# Patient Record
Sex: FEMALE | Race: WHITE | NOT HISPANIC OR LATINO | Employment: FULL TIME | URBAN - METROPOLITAN AREA
[De-identification: names, ages, dates, MRNs, and addresses within clinical notes are randomized per-mention and may not be internally consistent; named-entity substitution may affect disease eponyms.]

---

## 2017-06-22 ENCOUNTER — TELEPHONE (OUTPATIENT)
Dept: FAMILY MEDICINE | Facility: CLINIC | Age: 52
End: 2017-06-22

## 2017-06-22 NOTE — TELEPHONE ENCOUNTER
6/22/2017    Call Regarding Preventive Health Screening Colonoscopy    Attempt 1    Message on voicemail     Comments:       Outreach   KV

## 2017-07-31 NOTE — TELEPHONE ENCOUNTER
7/31/2017    Call Regarding Preventive Health Screening Colonoscopy    Attempt 2    Message on voicemail     Comments:           Outreach   Julia Del Valle

## 2017-08-04 NOTE — TELEPHONE ENCOUNTER
8/4/2017    Call Regarding Preventive Health Screening Colonoscopy, LDL and Physical    Attempt 3    Message on voicemail     Comments:       Outreach   KV

## 2017-10-11 ENCOUNTER — HOSPITAL ENCOUNTER (OUTPATIENT)
Dept: MRI IMAGING | Facility: CLINIC | Age: 52
Discharge: HOME | End: 2017-10-11
Attending: ORTHOPAEDIC SURGERY | Admitting: ORTHOPAEDIC SURGERY
Payer: COMMERCIAL

## 2017-10-11 DIAGNOSIS — S49.90XA SHOULDER INJURY: ICD-10-CM

## 2017-10-11 PROCEDURE — 73221 MRI JOINT UPR EXTREM W/O DYE: CPT | Mod: RT

## 2017-10-30 ENCOUNTER — TRANSFERRED RECORDS (OUTPATIENT)
Dept: HEALTH INFORMATION MANAGEMENT | Facility: CLINIC | Age: 52
End: 2017-10-30

## 2017-11-30 ENCOUNTER — OFFICE VISIT (OUTPATIENT)
Dept: DERMATOLOGY | Facility: CLINIC | Age: 52
End: 2017-11-30

## 2017-11-30 DIAGNOSIS — D18.01 CHERRY ANGIOMA: ICD-10-CM

## 2017-11-30 DIAGNOSIS — D48.5 NEOPLASM OF UNCERTAIN BEHAVIOR OF SKIN: Primary | ICD-10-CM

## 2017-11-30 DIAGNOSIS — L82.1 SEBORRHEIC KERATOSIS: ICD-10-CM

## 2017-11-30 DIAGNOSIS — Z12.83 SKIN CANCER SCREENING: ICD-10-CM

## 2017-11-30 DIAGNOSIS — L81.4 SOLAR LENTIGINOSIS: ICD-10-CM

## 2017-11-30 ASSESSMENT — PAIN SCALES - GENERAL: PAINLEVEL: NO PAIN (0)

## 2017-11-30 NOTE — PATIENT INSTRUCTIONS

## 2017-11-30 NOTE — MR AVS SNAPSHOT
After Visit Summary   11/30/2017    Josette Gregg    MRN: [id number]           Patient Information     Date Of Birth          1965        Visit Information        Provider Department      11/30/2017 2:30 PM Mery Frank PA-C [facility] Dermatology        Today's Diagnoses     Neoplasm of uncertain behavior of skin    -  1    Skin cancer screening        Solar lentiginosis        Seborrheic keratosis        Cherry angioma          Care Instructions    Wound Care After a Biopsy    What is a skin biopsy?  A skin biopsy allows the doctor to examine a very small piece of tissue under the microscope to determine the diagnosis and the best treatment for the skin condition. A local anesthetic (numbing medicine)  is injected with a very small needle into the skin area to be tested. A small piece of skin is taken from the area. Sometimes a suture (stitch) is used.     What are the risks of a skin biopsy?  I will experience scar, bleeding, swelling, pain, crusting and redness. I may experience incomplete removal or recurrence. Risks of this procedure are excessive bleeding, bruising, infection, nerve damage, numbness, thick (hypertrophic or keloidal) scar and non-diagnostic biopsy.    How should I care for my wound for the first 24 hours?    Keep the wound dry and covered for 24 hours    If it bleeds, hold direct pressure on the area for 15 minutes. If bleeding does not stop then go to the emergency room    Avoid strenuous exercise the first 1-2 days or as your doctor instructs you    How should I care for the wound after 24 hours?    After 24 hours, remove the bandage    You may bathe or shower as normal    If you had a scalp biopsy, you can shampoo as usual and can use shower water to clean the biopsy site daily    Clean the wound twice a day with gentle soap and water    Do not scrub, be gentle    Apply white petroleum/Vaseline after cleaning the wound with a cotton swab or a clean finger, and  keep the site covered with a Bandaid /bandage. Bandages are not necessary with a scalp biopsy    If you are unable to cover the site with a Bandaid /bandage, re-apply ointment 2-3 times a day to keep the site moist. Moisture will help with healing    Avoid strenuous activity for first 1-2 days    Avoid lakes, rivers, pools, and oceans until the stitches are removed or the site is healed    How do I clean my wound?    Wash hands thoroughly with soap or use hand  before all wound care    Clean the wound with gentle soap and water    Apply white petroleum/Vaseline  to wound after it is clean    Replace the Bandaid /bandage to keep the wound covered for the first few days or as instructed by your doctor    If you had a scalp biopsy, warm shower water to the area on a daily basis should suffice    What should I use to clean my wound?     Cotton-tipped applicators (Qtips )    White petroleum jelly (Vaseline ). Use a clean new container and use Q-tips to apply.    Bandaids   as needed    Gentle soap     How should I care for my wound long term?    Do not get your wound dirty    Keep up with wound care for one week or until the area is healed.    A small scab will form and fall off by itself when the area is completely healed. The area will be red and will become pink in color as it heals. Sun protection is very important for how your scar will turn out. Sunscreen with an SPF 30 or greater is recommended once the area is healed.    You should have some soreness but it should be mild and slowly go away over several days. Talk to your doctor about using tylenol for pain,    When should I call my doctor?  If you have increased:     Pain or swelling    Pus or drainage (clear or slightly yellow drainage is ok)    Temperature over 100F    Spreading redness or warmth around wound    When will I hear about my results?  The biopsy results can take 2-3 weeks to come back. The clinic will call you with the results, send you a  MedPass[employer] message, or have you schedule a follow-up clinic or phone time to discuss the results. Contact our clinics if you do not hear from us in 3 weeks.     Who should I call with questions?    [facility]: [phone]     [facility]: [phone]    For urgent needs outside of business hours call the [facility] at [phone] and ask for the dermatology resident on call          Follow-ups after your visit        Follow-up notes from your care team     Return in about 1 year (around 11/30/2018).      Who to contact     Please call your clinic at [phone] to:    Ask questions about your health    Make or cancel appointments    Discuss your medicines    Learn about your test results    Speak to your doctor   If you have compliments or concerns about an experience at your clinic, or if you wish to file a complaint, please contact [facility] Patient Relations at [phone] or email us at [email or web address]         Additional Information About Your Visit        [email or web address] Information     [email or web address] gives you secure access to your electronic health record. If you see a primary care provider, you can also send messages to your care team and make appointments. If you have questions, please call your primary care clinic.  If you do not have a primary care provider, please call [phone] and they will assist you.      [email or web address] is an electronic gateway that provides easy, online access to your medical records. With [email or web address], you can request a clinic appointment, read your test results, renew a prescription or communicate with your care team.     To access your existing account, please contact your [facility] or call [phone] for assistance.        Care EveryWhere ID     This is your Care EveryWhere ID. This could be used by other organizations to access your [facility]  records  [id number]         Blood Pressure from Last 3 Encounters:   06/02/16 102/63   05/07/15 106/70   02/20/15 110/81    Weight from Last 3 Encounters:   06/02/16 56.2 kg (124 lb)   05/07/15 55.6 kg (122 lb 8 oz)   02/20/15 57 kg (125 lb 9.6 oz)              We Performed the Following     BIOPSY SKIN/SUBQ/MUC MEM, SINGLE LESION     Surgical pathology exam        Primary Care Provider Office Phone # Fax #    Jessica Roxana Hogue [phone] [address]  [facility] [address]        Equal Access to Services     [facility] AURELIANO : Hadii aad ku hadasho Somurphy, waaxda luqadaha, qaybta kaalmada tani, leonarda chaves . So [facility] [phone].    ATENCIÓN: Si habla español, tiene a rashid disposición servicios gratuitos de asistencia lingüística. Llame al [phone].    We comply with applicable federal civil rights laws and [place] laws. We do not discriminate on the basis of race, color, national origin, age, disability, sex, sexual orientation, or gender identity.            Thank you!     Thank you for choosing [facility] DERMATOLOGY  for your care. Our goal is always to provide you with excellent care. Hearing back from our patients is one way we can continue to improve our services. Please take a few minutes to complete the written survey that you may receive in the mail after your visit with us. Thank you!             Your Updated Medication List - Protect others around you: Learn how to safely use, store and throw away your medicines at www.disposemymeds.org.          This list is accurate as of: 11/30/17  2:54 PM.  Always use your most recent med list.                   Brand Name Dispense Instructions for use Diagnosis    MULTIVITAMINS PO      Take 1 tablet by mouth daily

## 2017-11-30 NOTE — PROGRESS NOTES
[facility] Dermatology Note    Dermatology Problem List:  1. NUB, left upper chest s/p biopsy 11/30/17  2. Skin cancer screening 11/30/17    CC:   Chief Complaint   Patient presents with     Skin Check     Skin check,  one lesion of concern noted.     Date of Service: Nov 30, 2017    History of Present Illness:  Ms. Josette Gregg is a 52 year old female who presents for a skin check as a new patient. Today the patient reports that she has one lesion of concern on her chest that has changed and is irritating to her as it rubs on clothing. She also reports that she also has a spot on the side of her head that matches a spot on her son. She does not have concern for this lesion. She is overall feeling well and is in good health. The patient reports no other lesions of concern at this time.    Otherwise, the patient reports no painful, bleeding, nonhealing, or pruritic lesions, and denies new or changing moles.    Past Medical History:   Patient Active Problem List   Diagnosis     CARDIOVASCULAR SCREENING; LDL GOAL LESS THAN 160     Left shoulder pain     Arm pain, left     Other postprocedural status(V45.89)     Pain in joint, shoulder region     Anemia associated with acute blood loss     OA (osteoarthritis) of hip     Hip pain     S/P total hip arthroplasty     Seasonal allergic rhinitis     Benign neoplasm of scalp and skin of neck     Past Medical History:   Diagnosis Date     Congenital hip dysplasia     breech, treated     Left shoulder pain 7/16/2012     Past Surgical History:   Procedure Laterality Date     ARTHROPLASTY MINIMALLY INVASIVE HIP Left 2/16/2015    Procedure: ARTHROPLASTY MINIMALLY INVASIVE HIP;  Surgeon: Edson Oviedo MD;  Location: [facility] OR     ARTHROSCOPY SHOULDER, OPEN BICEP TENODESIS REPAIR, COMBINED  4/9/2014    Procedure: COMBINED ARTHROSCOPY SHOULDER, OPEN BICEP TENODESIS REPAIR;;  Surgeon: Josh Love MD;  Location: [facility]     ARTHROSCOPY SHOULDER, OPEN  ROTATOR CUFF REPAIR, COMBINED  4/9/2014    Procedure: COMBINED ARTHROSCOPY SHOULDER, OPEN ROTATOR CUFF REPAIR;  Diagnostic Left SHOULDER ARTHROSCOPY,  OPEN ROTATOR CUFF REPAIR, Biceps Tenodesis;  Surgeon: Josh Love MD;  Location: [facility]     GYN SURGERY       HYSTERECTOMY  1/2008     ORTHOPEDIC SURGERY       US JOINT INJECTION ASPIRATION MAJOR RIGHT  11/3/2010    left shoulder injection [place] see scan     Social History:  Patient works at [employer] with [employer].  She has had an average amount of sun exposure. She wears sunscreen often. Few sunburns as a child.   Worked as a  in high school.    Family History:  Mother has eczema.   No family history of skin cancer.    Medications:  Current Outpatient Prescriptions   Medication Sig Dispense Refill     Multiple Vitamin (MULTIVITAMINS PO) Take 1 tablet by mouth daily       Allergies:  No Known Allergies     Review of Systems:  - Skin: As above in HPI. No additional skin concerns.    Physical exam:  Vitals: There were no vitals taken for this visit.  GEN: This is a well developed, well-nourished female in no acute distress, in a pleasant mood.      SKIN: Full skin, which includes the head/face, both arms, chest, back, abdomen,both legs, genitalia and/or groin buttocks, digits and/or nails, was examined.  - Stuck on tan to brown papules on the face, back  - Freckling on upper back  - There are bright red some shaped papules scattered on the back and abdomen   - Multiple regular brown pigmented macules and papules are identified on the trunk, extremities  - Left upper chest: 0.6 mm pink scaly papule  - No other lesions of concern on areas examined.     Impression/Plan:  1. Seborrheic keratoses - face, back  - No further intervention required. Patient to report changes.     2. Solar lentigines - upper back  - No further intervention required. Patient to report changes.     3. Cherry angiomas - back, abdomen  - No further intervention  required. Patient to report changes.     4. Clinically benign nevi - trunk, extremities  - No further intervention required. Patient to report changes.   - Sun precaution was advised including the use of sun screens of SPF 30 or higher, sun protective clothing, and avoidance of tanning beds.    5. NUB - left upper chest. Neoplasm of uncertain behavior. Differential diagnosis includes ISK vs SCC  - Shave biopsy:  After discussion of benefits and risks including but not limited to bleeding/bruising, pain/swelling, infection, scar, incomplete removal, nerve damage/numbness, recurrence, and non-diagnostic biopsy, written consent, verbal consent and photographs were obtained. Time-out was performed. The area was cleaned with isopropyl alcohol and was injected to obtain adequate anesthesia of the lesion on the left upper chest. 2.0 ml of 1% lidocaine was injected to obtain adequate anesthesia. A shave biopsy was performed. Hemostasis was achieved with aluminium chloride. Vaseline and a sterile dressing were applied. The patient tolerated the procedure and no complications were noted. The patient was provided with verbal and written post care instructions.      Follow-up in 1 year, earlier for new or changing lesions.    Staff Involved:  Staff Only    Scribe Disclosure:   Tory WALSH, am serving as a scribe to document services personally performed by Mery Frank PA-C, based on data collection and the provider's statements to me.

## 2017-11-30 NOTE — LETTER
11/30/2017       RE: Josette Gregg  [address]  [facility] [address]     Dear Colleague,    Thank you for referring your patient, Josette Gregg, to the [facility] DERMATOLOGY at [facility]. Please see a copy of my visit note below.    [facility] Dermatology Note    Dermatology Problem List:  1. NUB, left upper chest s/p biopsy 11/30/17  2. Skin cancer screening 11/30/17    CC:   Chief Complaint   Patient presents with     Skin Check     Skin check,  one lesion of concern noted.     Date of Service: Nov 30, 2017    History of Present Illness:  Ms. Josette Gregg is a 52 year old female who presents for a skin check as a new patient. Today the patient reports that she has one lesion of concern on her chest that has changed and is irritating to her as it rubs on clothing. She also reports that she also has a spot on the side of her head that matches a spot on her son. She does not have concern for this lesion. She is overall feeling well and is in good health. The patient reports no other lesions of concern at this time.    Otherwise, the patient reports no painful, bleeding, nonhealing, or pruritic lesions, and denies new or changing moles.    Past Medical History:   Patient Active Problem List   Diagnosis     CARDIOVASCULAR SCREENING; LDL GOAL LESS THAN 160     Left shoulder pain     Arm pain, left     Other postprocedural status(V45.89)     Pain in joint, shoulder region     Anemia associated with acute blood loss     OA (osteoarthritis) of hip     Hip pain     S/P total hip arthroplasty     Seasonal allergic rhinitis     Benign neoplasm of scalp and skin of neck     Past Medical History:   Diagnosis Date     Congenital hip dysplasia     breech, treated     Left shoulder pain 7/16/2012     Past Surgical History:   Procedure Laterality Date     ARTHROPLASTY MINIMALLY INVASIVE HIP Left 2/16/2015    Procedure: ARTHROPLASTY MINIMALLY INVASIVE HIP;   Surgeon: Edson Oviedo MD;  Location:  OR     ARTHROSCOPY SHOULDER, OPEN BICEP TENODESIS REPAIR, COMBINED  4/9/2014    Procedure: COMBINED ARTHROSCOPY SHOULDER, OPEN BICEP TENODESIS REPAIR;;  Surgeon: Josh Love MD;  Location: [facility]     ARTHROSCOPY SHOULDER, OPEN ROTATOR CUFF REPAIR, COMBINED  4/9/2014    Procedure: COMBINED ARTHROSCOPY SHOULDER, OPEN ROTATOR CUFF REPAIR;  Diagnostic Left SHOULDER ARTHROSCOPY,  OPEN ROTATOR CUFF REPAIR, Biceps Tenodesis;  Surgeon: Josh Love MD;  Location: [facility]     GYN SURGERY       HYSTERECTOMY  1/2008     ORTHOPEDIC SURGERY       US JOINT INJECTION ASPIRATION MAJOR RIGHT  11/3/2010    left shoulder injection [place] see scan     Social History:  Patient works at [employer] with [employer].  She has had an average amount of sun exposure. She wears sunscreen often. Few sunburns as a child.   Worked as a  in high school.    Family History:  Mother has eczema.   No family history of skin cancer.    Medications:  Current Outpatient Prescriptions   Medication Sig Dispense Refill     Multiple Vitamin (MULTIVITAMINS PO) Take 1 tablet by mouth daily       Allergies:  No Known Allergies     Review of Systems:  - Skin: As above in HPI. No additional skin concerns.    Physical exam:  Vitals: There were no vitals taken for this visit.  GEN: This is a well developed, well-nourished female in no acute distress, in a pleasant mood.      SKIN: Full skin, which includes the head/face, both arms, chest, back, abdomen,both legs, genitalia and/or groin buttocks, digits and/or nails, was examined.  - Stuck on tan to brown papules on the face, back  - Freckling on upper back  - There are bright red some shaped papules scattered on the back and abdomen   - Multiple regular brown pigmented macules and papules are identified on the trunk, extremities  - Left upper chest: 0.6 mm pink scaly papule  - No other lesions of concern on areas examined.      Impression/Plan:  1. Seborrheic keratoses - face, back  - No further intervention required. Patient to report changes.     2. Solar lentigines - upper back  - No further intervention required. Patient to report changes.     3. Cherry angiomas - back, abdomen  - No further intervention required. Patient to report changes.     4. Clinically benign nevi - trunk, extremities  - No further intervention required. Patient to report changes.   - Sun precaution was advised including the use of sun screens of SPF 30 or higher, sun protective clothing, and avoidance of tanning beds.    5. NUB - left upper chest. Neoplasm of uncertain behavior. Differential diagnosis includes ISK vs SCC  - Shave biopsy:  After discussion of benefits and risks including but not limited to bleeding/bruising, pain/swelling, infection, scar, incomplete removal, nerve damage/numbness, recurrence, and non-diagnostic biopsy, written consent, verbal consent and photographs were obtained. Time-out was performed. The area was cleaned with isopropyl alcohol and was injected to obtain adequate anesthesia of the lesion on the left upper chest. 2.0 ml of 1% lidocaine was injected to obtain adequate anesthesia. A shave biopsy was performed. Hemostasis was achieved with aluminium chloride. Vaseline and a sterile dressing were applied. The patient tolerated the procedure and no complications were noted. The patient was provided with verbal and written post care instructions.      Follow-up in 1 year, earlier for new or changing lesions.    Staff Involved:  Staff Only    Scribe Disclosure:   Tory WALSH, am serving as a scribe to document services personally performed by Mery Frank PA-C, based on data collection and the provider's statements to me.    Again, thank you for allowing me to participate in the care of your patient.      Sincerely,    Mery Frank PA-C

## 2017-11-30 NOTE — NURSING NOTE
Dermatology Rooming Note    Josette Gregg's goals for this visit include:   Chief Complaint   Patient presents with     Skin Check     Skin check,  one lesion of concern noted.     Eli Nelson LPN

## 2017-12-01 LAB — COPATH REPORT: NORMAL

## 2018-02-05 ENCOUNTER — TRANSFERRED RECORDS (OUTPATIENT)
Dept: HEALTH INFORMATION MANAGEMENT | Facility: CLINIC | Age: 53
End: 2018-02-05

## 2018-04-12 ENCOUNTER — RADIANT APPOINTMENT (OUTPATIENT)
Dept: MAMMOGRAPHY | Facility: CLINIC | Age: 53
End: 2018-04-12
Attending: FAMILY MEDICINE
Payer: COMMERCIAL

## 2018-04-12 DIAGNOSIS — Z00.00 PREVENTATIVE HEALTH CARE: ICD-10-CM

## 2018-04-12 PROCEDURE — 77067 SCR MAMMO BI INCL CAD: CPT

## 2018-04-16 ENCOUNTER — TRANSFERRED RECORDS (OUTPATIENT)
Dept: HEALTH INFORMATION MANAGEMENT | Facility: CLINIC | Age: 53
End: 2018-04-16

## 2018-05-11 ENCOUNTER — TELEPHONE (OUTPATIENT)
Dept: OTHER | Facility: CLINIC | Age: 53
End: 2018-05-11

## 2018-05-11 ENCOUNTER — OFFICE VISIT (OUTPATIENT)
Dept: FAMILY MEDICINE | Facility: CLINIC | Age: 53
End: 2018-05-11
Payer: COMMERCIAL

## 2018-05-11 VITALS
BODY MASS INDEX: 22.82 KG/M2 | SYSTOLIC BLOOD PRESSURE: 114 MMHG | OXYGEN SATURATION: 100 % | WEIGHT: 124 LBS | HEIGHT: 62 IN | RESPIRATION RATE: 14 BRPM | TEMPERATURE: 97.9 F | DIASTOLIC BLOOD PRESSURE: 71 MMHG | HEART RATE: 70 BPM

## 2018-05-11 DIAGNOSIS — I83.811 VARICOSE VEINS OF LOWER EXTREMITY WITH PAIN, RIGHT: ICD-10-CM

## 2018-05-11 DIAGNOSIS — Z13.6 CARDIOVASCULAR SCREENING; LDL GOAL LESS THAN 160: ICD-10-CM

## 2018-05-11 DIAGNOSIS — Z11.4 ENCOUNTER FOR SCREENING FOR HIV: ICD-10-CM

## 2018-05-11 DIAGNOSIS — Z13.1 SCREENING FOR DIABETES MELLITUS: ICD-10-CM

## 2018-05-11 DIAGNOSIS — Z00.00 WELL WOMAN EXAM (NO GYNECOLOGICAL EXAM): Primary | ICD-10-CM

## 2018-05-11 DIAGNOSIS — Z12.11 SPECIAL SCREENING FOR MALIGNANT NEOPLASMS, COLON: ICD-10-CM

## 2018-05-11 LAB
CHOLEST SERPL-MCNC: 228 MG/DL
GLUCOSE SERPL-MCNC: 92 MG/DL (ref 70–99)
HDLC SERPL-MCNC: 66 MG/DL
HIV 1+2 AB+HIV1 P24 AG SERPL QL IA: NONREACTIVE
LDLC SERPL CALC-MCNC: 137 MG/DL
NONHDLC SERPL-MCNC: 162 MG/DL
TRIGL SERPL-MCNC: 124 MG/DL

## 2018-05-11 PROCEDURE — 87389 HIV-1 AG W/HIV-1&-2 AB AG IA: CPT | Performed by: FAMILY MEDICINE

## 2018-05-11 PROCEDURE — 36415 COLL VENOUS BLD VENIPUNCTURE: CPT | Performed by: FAMILY MEDICINE

## 2018-05-11 PROCEDURE — 82947 ASSAY GLUCOSE BLOOD QUANT: CPT | Performed by: FAMILY MEDICINE

## 2018-05-11 PROCEDURE — 80061 LIPID PANEL: CPT | Performed by: FAMILY MEDICINE

## 2018-05-11 PROCEDURE — 99396 PREV VISIT EST AGE 40-64: CPT | Performed by: FAMILY MEDICINE

## 2018-05-11 NOTE — MR AVS SNAPSHOT
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, right        CARDIOVASCULAR SCREENING; LDL GOAL LESS THAN 160        Screening for diabetes mellitus        Encounter for screening for HIV        Special screening for malignant neoplasms, colon          Care Instructions    Lab Results   Component Value Date    CHOL 158 07/12/2012     Lab Results   Component Value Date    HDL 59 07/12/2012     Lab Results   Component Value Date    LDL 84 07/12/2012     Lab Results   Component Value Date    TRIG 80 07/12/2012     Lab Results   Component Value Date    CHOLHDLRATIO 2.7 07/12/2012       Preventive Health Recommendations  Female Ages 50 - 64    Yearly exam: See your health care provider every year in order to  o Review health changes.   o Discuss preventive care.    o Review your medicines if your doctor has prescribed any.      Get a Pap test every three years (unless you have an abnormal result and your provider advises testing more often).    If you get Pap tests with HPV test, you only need to test every 5 years, unless you have an abnormal result.     You do not need a Pap test if your uterus was removed (hysterectomy) and you have not had cancer.    You should be tested each year for STDs (sexually transmitted diseases) if you're at risk.     Have a mammogram every 1 to 2 years.    Have a colonoscopy at age 50, or have a yearly FIT test (stool test). These exams screen for colon cancer.  You can call [phone] to schedule one at your earliest convenience, thank you!    Have a cholesterol test every 5 years, or more often if advised.    Have a diabetes test  (fasting glucose) every three years. If you are at risk for diabetes, you should have this test more often.     If you are at risk for osteoporosis (brittle bone disease), think about having a bone density scan (DEXA).    Shots: Get a flu shot each year. Get a tetanus shot every 10 years.    Nutrition:     Eat at least 5 servings of fruits and vegetables each day.    Eat whole-grain bread, whole-wheat pasta and brown rice instead of white grains and rice.    Talk to your provider about Calcium and Vitamin D.     Lifestyle    Exercise at least 150 minutes a week (30 minutes a day, 5 days a week). This will help you control your weight and prevent disease.    Limit alcohol to one drink per day.    No smoking.     Wear sunscreen to prevent skin cancer.     See your dentist every six months for an exam and cleaning.    See your eye doctor every 1 to 2 years.            Follow-ups after your visit        Additional Services     GASTROENTEROLOGY ADULT REF PROCEDURE ONLY       Last Lab Result: Creatinine (mg/dL)       Date                     Value                 02/18/2015               0.63             ----------  Body mass index is 23.05 kg/(m^2).     Needed:  No  Language:  English    Patient will be contacted to schedule procedure.     Please be aware that coverage of these services is subject to the terms and limitations of your health insurance plan.  Call member services at your health plan with any benefit or coverage questions.  Any procedures must be performed at a [place] facility OR coordinated by your clinic's referral office.    Please bring the following with you to your appointment:    (1) Any X-Rays, CTs or MRIs which have been performed.  Contact the facility where they were done to arrange for  prior to your scheduled appointment.    (2) List of current medications   (3) This referral request   (4) Any documents/labs given to you for this referral            VASCULAR SURGERY REFERRAL        Your provider has referred you to: [facility]: [facility] [place] - [phone] or [facility] - [facility]   [email or web address]  [facility]: Vascular Surgery Clinic - [place] [phone]   [email or web address]    Please be aware that coverage of these services is subject to the terms and limitations of your health insurance plan.  Call member services at your health plan with any benefit or coverage questions.      Please bring the following with you to your appointment:    (1) Any X-Rays, CTs or MRIs which have been performed.  Contact the facility where they were done to arrange for  prior to your scheduled appointment.    (2) List of current medications   (3) This referral request   (4) Any documents/labs given to you for this referral                  Who to contact     If you have questions or need follow up information about today's clinic visit or your schedule please contact [facility] directly at [phone].  Normal or non-critical lab and imaging results will be communicated to you by MyChart, letter or phone within 4 business days after the clinic has received the results. If you do not hear from us within 7 days, please contact the clinic through [employer] or phone. If you have a critical or abnormal lab result, we will notify you by phone as soon as possible.  Submit refill requests through [employer] or call your pharmacy and they will forward the refill request to us. Please allow 3 business days for your refill to be completed.          Additional Information About Your Visit        [employer] Information     [employer] gives you secure access to your electronic health record. If you see a primary care provider, you can also send messages to your care team and make appointments. If you have questions, please call your primary care clinic.  If you do not have a primary care provider, please  "call [phone] and they will assist you.        Care EveryWhere ID     This is your Care EveryWhere ID. This could be used by other organizations to access your [facility] medical records  [id number]        Your Vitals Were     Pulse Temperature Respirations Height Pulse Oximetry BMI (Body Mass Index)    70 97.9  F (36.6  C) (Tympanic) 14 5' 1.5\" (1.562 m) 100% 23.05 kg/m2       Blood Pressure from Last 3 Encounters:   05/11/18 114/71   06/02/16 102/63   05/07/15 106/70    Weight from Last 3 Encounters:   05/11/18 124 lb (56.2 kg)   06/02/16 124 lb (56.2 kg)   05/07/15 122 lb 8 oz (55.6 kg)              We Performed the Following     GASTROENTEROLOGY ADULT REF PROCEDURE ONLY     GLUCOSE     HIV Antigen Antibody Combo     Lipid panel reflex to direct LDL Fasting     VASCULAR SURGERY REFERRAL        Primary Care Provider Office Phone # Fax #    Jessica Hogue [phone] [address] [place] [phone]        Equal Access to Services     [facility] : Hadii aad ku hadasho Somurphy, waaxda luqadaha, qaybta kaalmaashley freire, leonarda chaves . So [facility] [phone].    ATENCIÓN: Si habla español, tiene a rashid disposición servicios gratuitos de asistencia lingüística. [facility] [phone].    We comply with applicable federal civil rights laws and [place] laws. We do not discriminate on the basis of race, color, national origin, age, disability, sex, sexual orientation, or gender identity.            Thank you!     Thank you for choosing [facility]  for your care. Our goal is always to provide you with excellent care. Hearing back from our patients is one way we can continue to improve our services. Please take a few minutes to complete the written survey that you may receive in the mail after your visit with us. Thank you!             Your Updated Medication List - Protect others around you: Learn how to safely use, store and throw away " your medicines at www.disposemymeds.org.          This list is accurate as of 5/11/18  8:04 AM.  Always use your most recent med list.                   Brand Name Dispense Instructions for use Diagnosis    MULTIVITAMINS PO      Take 1 tablet by mouth daily

## 2018-05-11 NOTE — PROGRESS NOTES
SUBJECTIVE:   CC: Josette Gregg is an 53 year old woman who presents for preventive health visit.     Physical   Annual:     Getting at least 3 servings of Calcium per day::  Yes    Bi-annual eye exam::  Yes    Dental care twice a year::  Yes    Sleep apnea or symptoms of sleep apnea::  None    Diet::  Regular (no restrictions)    Frequency of exercise::  4-5 days/week    Duration of exercise::  15-30 minutes    Taking medications regularly::  Yes    Medication side effects::  None    Additional concerns today::  No                    Today's PHQ-2 Score:   PHQ-2 ( 1999 [employer]) 5/11/2018   Q1: Little interest or pleasure in doing things 0   Q2: Feeling down, depressed or hopeless 0   PHQ-2 Score 0   Q1: Little interest or pleasure in doing things Not at all   Q2: Feeling down, depressed or hopeless Not at all   PHQ-2 Score 0       Abuse: Current or Past(Physical, Sexual or Emotional)- No  Do you feel safe in your environment - Yes    Social History   Substance Use Topics     Smoking status: Never Smoker     Smokeless tobacco: Never Used     Alcohol use Yes      Comment: occasional     Alcohol Use 5/11/2018   If you drink alcohol do you typically have greater than 3 drinks per day OR greater than 7 drinks per week? No   No flowsheet data found.    Reviewed orders with patient.  Reviewed health maintenance and updated orders accordingly - Yes  BP Readings from Last 3 Encounters:   05/11/18 114/71   06/02/16 102/63   05/07/15 106/70    Wt Readings from Last 3 Encounters:   05/11/18 124 lb (56.2 kg)   06/02/16 124 lb (56.2 kg)   05/07/15 122 lb 8 oz (55.6 kg)                  Current Outpatient Prescriptions   Medication Sig Dispense Refill     Multiple Vitamin (MULTIVITAMINS PO) Take 1 tablet by mouth daily       No Known Allergies  Recent Labs   Lab Test  02/20/15   0651  02/18/15   0502  02/17/15   0500   07/12/12   0955   LDL   --    --    --    --   84   HDL   --    --    --    --   59   TRIG   --    --    --     --   80   CR   --   0.63  0.58   < >   --    GFRESTIMATED   --   >90  Non  GFR Calc    >90  Non  GFR Calc     < >   --    GFRESTBLACK   --   >90   GFR Calc    >90   GFR Calc     < >   --    POTASSIUM  3.6  3.9  4.2   < >   --     < > = values in this interval not displayed.        Patient over age 50, mutual decision to screen reflected in health maintenance.    Pertinent mammograms are reviewed under the imaging tab.  History of abnormal Pap smear: NO - age 30-65 PAP every 5 years with negative HPV co-testing recommended  Last 3 Pap and HPV Results:      Reviewed and updated as needed this visit by clinical staff  Tobacco  Meds  Med Hx  Surg Hx  Fam Hx  Soc Hx        Reviewed and updated as needed this visit by Provider  Tobacco  Fam Hx  Soc Hx       Past Medical History:   Diagnosis Date     Congenital hip dysplasia     breech, treated     Left shoulder pain 2012      Past Surgical History:   Procedure Laterality Date     ARTHROPLASTY MINIMALLY INVASIVE HIP Left 2015    Procedure: ARTHROPLASTY MINIMALLY INVASIVE HIP;  Surgeon: Edson Oviedo MD;  Location: [facility]     ARTHROSCOPY SHOULDER, OPEN BICEP TENODESIS REPAIR, COMBINED  2014    Procedure: COMBINED ARTHROSCOPY SHOULDER, OPEN BICEP TENODESIS REPAIR;;  Surgeon: Josh Love MD;  Location: [facility]     ARTHROSCOPY SHOULDER, OPEN ROTATOR CUFF REPAIR, COMBINED  2014    Procedure: COMBINED ARTHROSCOPY SHOULDER, OPEN ROTATOR CUFF REPAIR;  Diagnostic Left SHOULDER ARTHROSCOPY,  OPEN ROTATOR CUFF REPAIR, Biceps Tenodesis;  Surgeon: Josh Love MD;  Location: [facility]     GYN SURGERY       HYSTERECTOMY  2008     ORTHOPEDIC SURGERY       US JOINT INJECTION ASPIRATION MAJOR RIGHT  11/3/2010    left shoulder injection Jagdish see scan     Obstetric History       T1      L1     SAB0   TAB0   Ectopic0   Multiple0   Live Births1       # Outcome Date GA  "Lbr Ephraim/2nd Weight Sex Delivery Anes PTL Lv   1 Term 91 40w0d  7 lb 3 oz (3.26 kg) M  Spinal  SEMAJ      Name: Vladimir          Review of Systems  CONSTITUTIONAL: NEGATIVE for fever, chills, change in weight  INTEGUMENTARY/SKIN: NEGATIVE for worrisome rashes, moles or lesions  EYES: NEGATIVE for vision changes or irritation  ENT: NEGATIVE for ear, mouth and throat problems  RESP: NEGATIVE for significant cough or SOB  BREAST: NEGATIVE for masses, tenderness or discharge  CV: NEGATIVE for chest pain, palpitations or peripheral edema  GI: NEGATIVE for nausea, abdominal pain, heartburn, or change in bowel habits  : NEGATIVE for unusual urinary or vaginal symptoms. No vaginal bleeding.  MUSCULOSKELETAL: NEGATIVE for significant arthralgias or myalgia  NEURO: NEGATIVE for weakness, dizziness or paresthesias  ENDOCRINE: NEGATIVE for temperature intolerance, skin/hair changes  HEME/ALLERGY/IMMUNE: NEGATIVE for bleeding problems  PSYCHIATRIC: NEGATIVE for changes in mood or affect      OBJECTIVE:   /71  Pulse 70  Temp 97.9  F (36.6  C) (Tympanic)  Resp 14  Ht 5' 1.5\" (1.562 m)  Wt 124 lb (56.2 kg)  SpO2 100%  BMI 23.05 kg/m2  Physical Exam  GENERAL: healthy, alert and no distress  EYES: Eyes grossly normal to inspection, PERRL and conjunctivae and sclerae normal  HENT: ear canals and TM's normal, nose and mouth without ulcers or lesions  NECK: no adenopathy, no asymmetry, masses, or scars and thyroid normal to palpation  RESP: lungs clear to auscultation - no rales, rhonchi or wheezes  CV: regular rate and rhythm, normal S1 S2, no S3 or S4, no murmur, click or rub, no peripheral edema and peripheral pulses strong  ABDOMEN: soft, nontender, no hepatosplenomegaly, no masses and bowel sounds normal  MS: no gross musculoskeletal defects noted, no edema; right leg superior to calf, posterior leg; visible blue vein that is tender to touch, not excessively protuberant, no other enlarged or engorged veins " "noted  SKIN: no suspicious lesions or rashes  NEURO: Normal strength and tone, mentation intact and speech normal  PSYCH: mentation appears normal, affect normal/bright  LYMPH: normal ant/post cervical, supraclavicular nodes    ASSESSMENT/PLAN:   1. Well woman exam (no gynecological exam)  routine    2. 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 CARDIOVASCULAR SCREENING; LDL GOAL LESS THAN 160  LDL Cholesterol Calculated   Date Value Ref Range Status   07/12/2012 84 0 - 129 mg/dL Final     Comment:     LDL Cholesterol is the primary guide to therapy: LDL-cholesterol goal in high   risk patients is <100 mg/dL and in very high risk patients is <70 mg/dL.   ]   - Lipid panel reflex to direct LDL Fasting    4. Screening for diabetes mellitus  Screening, no risk factors  - GLUCOSE    5. Encounter for screening for HIV  Routine one time screen  - HIV Antigen Antibody Combo    6. Special screening for malignant neoplasms, colon    - GASTROENTEROLOGY ADULT REF PROCEDURE ONLY    COUNSELING:  Reviewed preventive health counseling, as reflected in patient instructions         reports that she has never smoked. She has never used smokeless tobacco.    Estimated body mass index is 23.05 kg/(m^2) as calculated from the following:    Height as of this encounter: 5' 1.5\" (1.562 m).    Weight as of this encounter: 124 lb (56.2 kg).       Counseling Resources:  ATP IV Guidelines  Pooled Cohorts Equation Calculator  Breast Cancer Risk Calculator  FRAX Risk Assessment  ICSI Preventive Guidelines  Dietary Guidelines for Americans, 2010  USDA's MyPlate  ASA Prophylaxis  Lung CA Screening    Jessica Hogue MD  [facility]  Answers for HPI/ROS submitted by the patient on 5/11/2018   PHQ-2 Score: 0    "

## 2018-05-11 NOTE — TELEPHONE ENCOUNTER
"Referral received via EPIC \"in box\", per  guidelines referral forwarded to [facility].     Felecia Leggett, BARRYN, RN    "

## 2018-05-11 NOTE — PATIENT INSTRUCTIONS
Lab Results   Component Value Date    CHOL 158 07/12/2012     Lab Results   Component Value Date    HDL 59 07/12/2012     Lab Results   Component Value Date    LDL 84 07/12/2012     Lab Results   Component Value Date    TRIG 80 07/12/2012     Lab Results   Component Value Date    CHOLHDLRATIO 2.7 07/12/2012       Preventive Health Recommendations  Female Ages 50 - 64    Yearly exam: See your health care provider every year in order to  o Review health changes.   o Discuss preventive care.    o Review your medicines if your doctor has prescribed any.      Get a Pap test every three years (unless you have an abnormal result and your provider advises testing more often).    If you get Pap tests with HPV test, you only need to test every 5 years, unless you have an abnormal result.     You do not need a Pap test if your uterus was removed (hysterectomy) and you have not had cancer.    You should be tested each year for STDs (sexually transmitted diseases) if you're at risk.     Have a mammogram every 1 to 2 years.    Have a colonoscopy at age 50, or have a yearly FIT test (stool test). These exams screen for colon cancer.  You can call [phone] to schedule one at your earliest convenience, thank you!    Have a cholesterol test every 5 years, or more often if advised.    Have a diabetes test (fasting glucose) every three years. If you are at risk for diabetes, you should have this test more often.     If you are at risk for osteoporosis (brittle bone disease), think about having a bone density scan (DEXA).    Shots: Get a flu shot each year. Get a tetanus shot every 10 years.    Nutrition:     Eat at least 5 servings of fruits and vegetables each day.    Eat whole-grain bread, whole-wheat pasta and brown rice instead of white grains and rice.    Talk to your provider about Calcium and Vitamin D.     Lifestyle    Exercise at least 150 minutes a week (30 minutes a day, 5 days a week). This will help you control your  weight and prevent disease.    Limit alcohol to one drink per day.    No smoking.     Wear sunscreen to prevent skin cancer.     See your dentist every six months for an exam and cleaning.    See your eye doctor every 1 to 2 years.

## 2018-05-14 NOTE — PROGRESS NOTES
Hello!  It was a pleasure to see you in clinic!  Thank you for getting labs done.     Your hiv test was negative for infection, you do NOT have this disease.     Your total cholesterol is high but everything else looks great!  Your HDL level is high and your LDL and triglycerides are at goal (< 150).    Your glucose was normal, which is great, you do not have diabetes.     Sincerely,  Dr. Jessica Hogue MD  5/14/2018

## 2018-10-29 ENCOUNTER — THERAPY VISIT (OUTPATIENT)
Dept: PHYSICAL THERAPY | Facility: CLINIC | Age: 53
End: 2018-10-29
Payer: COMMERCIAL

## 2018-10-29 DIAGNOSIS — G89.29 CHRONIC RIGHT SHOULDER PAIN: Primary | ICD-10-CM

## 2018-10-29 DIAGNOSIS — M25.511 CHRONIC RIGHT SHOULDER PAIN: Primary | ICD-10-CM

## 2018-10-29 PROCEDURE — 97530 THERAPEUTIC ACTIVITIES: CPT | Mod: GP | Performed by: PHYSICAL THERAPIST

## 2018-10-29 PROCEDURE — 97161 PT EVAL LOW COMPLEX 20 MIN: CPT | Mod: GP | Performed by: PHYSICAL THERAPIST

## 2018-10-29 NOTE — PROGRESS NOTES
Physical Therapy Initial Examination/Evaluation  October 29, 2018    Josette Gregg is a 53 year old female referred to physical therapy by Josh Love MD for treatment of R shoulder pain with Precautions/Restrictions/MD instructions E&T    Therapist Impression:   Josette presents with frozen shoulder stage II/III.  Initiated stretching and light strengthening as well as tpaing for pain relief.    Subjective:  DOI/onset: 2/5/2018 DOS: none  Acute Injury or Gradual Onset?: Gradual injury over time  Mechanism of Injury: unknown  Related PMH: L shoulder RCR Previous Treatment: Chiropractor Effect of prior treatment: fair  Imaging: MRI  Chief Complaint/Functional Limitations:   Waking up and night with arm pain and see below in therapy evaluation codes   Pain: rest 5/10, activity 7/10 Location: radiates down shoulder, neck Frequency: Constant Described as: shooting, tingling in neck  Alleviated by: Advil Progression of Symptoms: Unchanged Time of day when pain is worse: Night  Sleeping: Interrupted due to current issue   Occupation:   Job duties: prolonged sitting, keyboarding/computer use  Current HEP/exercise regimen: NA  Patient's goals are see chief complaints     Other pertinent PMH/Red Flags: None   Barriers at home/work: None as reported by patient  Pertinent Surgical History: L RCR, L ANDREW  Medications: Anti-inflammatory  General health as reported by patient: good  Return to MD:  prn    SHOULDER EXAMINATION  STATIC POSTURE  Forward head: mild   Rounded shoulders:moderate  Shoulder internally rotated: mild   Visual inspection: NA    SHOULDER RANGE OF MOTION  AROM Flexion Abduction ER Base Ext/IR or hand behind back angle   Left 90 70  Ant Drift: na 10 T7   Right 125 120  Ant Drift: na 30 L5   Pain: yes      PROM Flexion Abd 90-90 ER 90-90 IR Scap Stabilized Horizontal Adduction   Left 100 na  Base ER 20 deg na na   Right na 120 stretch 90-90 ER 30 deg pain 25 na   GH indicates pure  glenohumeral ROM    SHOULDER STRENGTH  MMT Flexion Scaption ER IR   Left 3/5 na/5 3+/5 5-/5   Right 4+/5 4+/5 4+/5 4+/5     Assessment/Plan:  Patient is a 53 year old female with right side shoulder complaints.    Patient has the following significant findings with corresponding treatment plan.                Diagnosis 1:  R shoulder pain  Pain -  hot/cold therapy, manual therapy, splint/taping/bracing/orthotics, self management, education and home program  Decreased ROM/flexibility - manual therapy and therapeutic exercise  Decreased strength - therapeutic exercise and therapeutic activities  Impaired muscle performance - neuro re-education  Impaired posture - neuro re-education    Therapy Evaluation Codes:   1) History comprised of:   Personal factors that impact the plan of care:      Past/current experiences.    Comorbidity factors that impact the plan of care are:      None.     Medications impacting care: None.  2) Examination of Body Systems comprised of:   Body structures and functions that impact the plan of care:      Shoulder.   Activity limitations that impact the plan of care are:      Bathing, Dressing and Lifting.  3) Clinical presentation characteristics are:   Evolving/Changing.  4) Decision-Making    Moderate complexity using standardized patient assessment instrument and/or measureable assessment of functional outcome.  Cumulative Therapy Evaluation is: Low complexity.    Previous and current functional limitations:  (See Goal Flow Sheet for this information)    Short term and Long term goals: (See Goal Flow Sheet for this information)     Communication ability:  Patient appears to be able to clearly communicate and understand verbal and written communication and follow directions correctly.  Treatment Explanation - The following has been discussed with the patient:   RX ordered/plan of care  Anticipated outcomes  Possible risks and side effects  This patient would benefit from PT intervention to  resume normal activities.   Rehab potential is good.    Frequency:  1 X week, once daily  Duration:  for 4 weeks  Discharge Plan:  Achieve all LTG.  Independent in home treatment program.  Reach maximal therapeutic benefit.    Please refer to the daily flowsheet for treatment today, total treatment time and time spent performing 1:1 timed codes.

## 2018-10-29 NOTE — MR AVS SNAPSHOT
After Visit Summary   10/29/2018    Josette Gregg    MRN: [id number]           Patient Information     Date Of Birth          1965        Visit Information        Provider Department      10/29/2018 3:50 PM [facility]        Today's Diagnoses     Chronic right shoulder pain    -  1       Follow-ups after your visit        Your next 10 appointments already scheduled     Nov 12, 2018 11:00 AM CST   (Arrive by 10:45 AM)   UDAY Extremity with Chuy Leblanc PT   [facility] ([facility])    [address] [place] [address]   [phone]            Nov 23, 2018  4:30 PM CST   (Arrive by 4:15 PM)   UDAY Extremity with Chuy Leblanc PT   [facility] ([facility])    [address] [place] [address]   [phone]            Dec 03, 2018  3:00 PM CST   (Arrive by 2:45 PM)   [facility] Extremity with Chuy [facility]blanc PT   [facility] ([facility])    [address] [place] [address]   [phone]              Who to contact     If you have questions or need follow up information about today's clinic visit or your schedule please contact [facility] directly at [phone].  Normal or non-critical lab and imaging results will be communicated to you by MyChart, letter or phone within 4 business days after the clinic has received the results. If you do not hear from us within 7 days, please contact the clinic through MyChart or phone. If you have a critical or abnormal lab result, we will notify you by phone as soon as possible.  Submit refill requests through [employer] or call your pharmacy and they will forward the refill request to us. Please allow 3 business days for your refill to be completed.          Additional Information About Your Visit         [employer] Information     [employer] gives you secure access to your electronic health record. If you see a primary care provider, you can also send messages to your care team and make appointments. If you have questions, please call your primary care clinic.  If you do not have a primary care provider, please call [phone] and they will assist you.        Care EveryWhere ID     This is your Care EveryWhere ID. This could be used by other organizations to access your [place] medical records  [id number]         Blood Pressure from Last 3 Encounters:   05/11/18 114/71   06/02/16 102/63   05/07/15 106/70    Weight from Last 3 Encounters:   05/11/18 56.2 kg (124 lb)   06/02/16 56.2 kg (124 lb)   05/07/15 55.6 kg (122 lb 8 oz)              We Performed the Following     HC PT EVAL, LOW COMPLEXITY     THERAPEUTIC ACTIVITIES        Primary Care Provider Office Phone # Fax #    Jessica Hogue [phone] [address] [place] [address]        Equal Access to Services     [facility]: Hadii aad ku hadasho Soomaali, waaxda luqadaha, qaybta kaalmada aderadhayaashley, leonarda chaves . So [facility] [phone].    ATENCIÓN: Si habla español, tiene a rashid disposición servicios gratuitos de asistencia lingüística. [facility] [phone].    We comply with applicable federal civil rights laws and [place] laws. We do not discriminate on the basis of race, color, national origin, age, disability, sex, sexual orientation, or gender identity.            Thank you!     Thank you for choosing [facility]  for your care. Our goal is always to provide you with excellent care. Hearing back from our patients is one way we can continue to improve our services. Please take a few minutes to complete the written survey that you may receive in the mail after your visit with us. Thank you!             Your Updated Medication List - Protect others around you:  Learn how to safely use, store and throw away your medicines at www.disposemymeds.org.          This list is accurate as of 10/29/18 10:02 PM.  Always use your most recent med list.                   Brand Name Dispense Instructions for use Diagnosis    MULTIVITAMINS PO      Take 1 tablet by mouth daily

## 2018-11-12 ENCOUNTER — THERAPY VISIT (OUTPATIENT)
Dept: PHYSICAL THERAPY | Facility: CLINIC | Age: 53
End: 2018-11-12
Payer: COMMERCIAL

## 2018-11-12 DIAGNOSIS — G89.29 CHRONIC RIGHT SHOULDER PAIN: ICD-10-CM

## 2018-11-12 DIAGNOSIS — M25.511 CHRONIC RIGHT SHOULDER PAIN: ICD-10-CM

## 2018-11-12 PROCEDURE — 97530 THERAPEUTIC ACTIVITIES: CPT | Mod: GP | Performed by: PHYSICAL THERAPIST

## 2018-11-12 PROCEDURE — 97110 THERAPEUTIC EXERCISES: CPT | Mod: GP | Performed by: PHYSICAL THERAPIST

## 2018-11-12 NOTE — PROGRESS NOTES
SHOULDER RANGE OF MOTION  AROM Flexion Abduction ER Base Ext/IR or hand behind back angle   Left 90 70  Ant Drift: na 10 T7   Right 130 120  Ant Drift: na 35 L4   Pain: yes      PROM Flexion Abd 90-90 ER 90-90 IR Scap Stabilized Horizontal Adduction   Left 100 na  Base ER 20 deg na na   Right na 120 stretch 90-90 ER 30 deg pain 25 na   GH indicates pure glenohumeral ROM

## 2018-11-12 NOTE — MR AVS SNAPSHOT
After Visit Summary   11/12/2018    Josette Gregg    MRN: [id number]           Patient Information     Date Of Birth          1965        Visit Information        Provider Department      11/12/2018 11:00 AM Chuy Leblanc PT [facility]        Today's Diagnoses     Chronic right shoulder pain           Follow-ups after your visit        Who to contact     If you have questions or need follow up information about today's clinic visit or your schedule please contact [facility] directly at [phone].  Normal or non-critical lab and imaging results will be communicated to you by [employer], letter or phone within 4 business days after the clinic has received the results. If you do not hear from us within 7 days, please contact the clinic through [employer] or phone. If you have a critical or abnormal lab result, we will notify you by phone as soon as possible.  Submit refill requests through [employer] or call your pharmacy and they will forward the refill request to us. Please allow 3 business days for your refill to be completed.          Additional Information About Your Visit        MyChart Information     [employer] gives you secure access to your electronic health record. If you see a primary care provider, you can also send messages to your care team and make appointments. If you have questions, please call your primary care clinic.  If you do not have a primary care provider, please call [phone] and they will assist you.        Care EveryWhere ID     This is your Care EveryWhere ID. This could be used by other organizations to access your [place] medical records  [id number]         Blood Pressure from Last 3 Encounters:   05/11/18 114/71   06/02/16 102/63   05/07/15 106/70    Weight from Last 3 Encounters:   05/11/18 56.2 kg (124 lb)   06/02/16 56.2 kg (124 lb)   05/07/15 55.6 kg (122 lb 8 oz)              We Performed the  Following     THERAPEUTIC ACTIVITIES     THERAPEUTIC EXERCISES        Primary Care Provider Office Phone # Fax #    Jessica Hogue [phone] [address] [facility] [address]        Equal Access to Services     [facility] : Hadii aad ku hadkathyo Somarandaali, waaxda luqadaha, qaybta kaalmada adeegyada, leonarda mccoyn brianradha haq anastacio mario. So [facility] [phone].    ATENCIÓN: Si habla español, tiene a rashid disposición servicios gratuitos de asistencia lingüística. Llame al [phone].    We comply with applicable federal civil rights laws and [place] laws. We do not discriminate on the basis of race, color, national origin, age, disability, sex, sexual orientation, or gender identity.            Thank you!     Thank you for choosing [facility] [place]  for your care. Our goal is always to provide you with excellent care. Hearing back from our patients is one way we can continue to improve our services. Please take a few minutes to complete the written survey that you may receive in the mail after your visit with us. Thank you!             Your Updated Medication List - Protect others around you: Learn how to safely use, store and throw away your medicines at www.disposemymeds.org.          This list is accurate as of 11/12/18 11:34 AM.  Always use your most recent med list.                   Brand Name Dispense Instructions for use Diagnosis    MULTIVITAMINS PO      Take 1 tablet by mouth daily

## 2019-04-09 ENCOUNTER — TELEPHONE (OUTPATIENT)
Dept: GASTROENTEROLOGY | Facility: OUTPATIENT CENTER | Age: 54
End: 2019-04-09

## 2019-04-09 DIAGNOSIS — Z12.11 ENCOUNTER FOR SCREENING COLONOSCOPY: Primary | ICD-10-CM

## 2019-04-09 RX ORDER — PEG-3350, SODIUM SULFATE, SODIUM CHLORIDE, POTASSIUM CHLORIDE, SODIUM ASCORBATE AND ASCORBIC ACID 7.5-2.691G
1 KIT ORAL SEE ADMIN INSTRUCTIONS
Qty: 1 EACH | Refills: 0 | Status: SHIPPED | OUTPATIENT
Start: 2019-04-09 | End: 2019-04-12

## 2019-04-09 RX ORDER — SIMETHICONE 125 MG
125 CAPSULE ORAL SEE ADMIN INSTRUCTIONS
Qty: 1 CAPSULE | Refills: 0 | Status: SHIPPED | OUTPATIENT
Start: 2019-04-09 | End: 2019-04-12

## 2019-04-09 NOTE — TELEPHONE ENCOUNTER
Patient taking any blood thinners ? No     Heart disease ? Denies     Lung disease ?Denies       Sleep apnea ?Denies     Diabetic ?Denies     Kidney disease ?Denies     Electronic implanted medical devices ?Denies     Are you taking any narcotic pain medication ? No   What is your daily dosage ?    PTSD ? N/a     Prep instructions reviewed with patient ? Patient declined review,  policy, conscious sedation plan reviewed. Advised patient to have someone stay with her post exam    Pharmacy : [facility]    Indication for procedure : Screening colonoscopy    Referring provider : Self     Arrival Time : 8:30 AM

## 2019-04-12 ENCOUNTER — TELEPHONE (OUTPATIENT)
Dept: GASTROENTEROLOGY | Facility: OUTPATIENT CENTER | Age: 54
End: 2019-04-12

## 2019-04-12 DIAGNOSIS — Z12.11 ENCOUNTER FOR SCREENING COLONOSCOPY: Primary | ICD-10-CM

## 2019-04-12 NOTE — TELEPHONE ENCOUNTER
Pt requestscommunication via unencrypted e-mail. This includes prep instructions.  Pt acknowledges that they have agreed to accept the risks and receive unencrypted communications for this incidence.

## 2019-04-19 ENCOUNTER — DOCUMENTATION ONLY (OUTPATIENT)
Dept: GASTROENTEROLOGY | Facility: OUTPATIENT CENTER | Age: 54
End: 2019-04-19
Payer: COMMERCIAL

## 2019-04-19 ENCOUNTER — TRANSFERRED RECORDS (OUTPATIENT)
Dept: HEALTH INFORMATION MANAGEMENT | Facility: CLINIC | Age: 54
End: 2019-04-19

## 2019-06-20 ENCOUNTER — ANCILLARY PROCEDURE (OUTPATIENT)
Dept: MAMMOGRAPHY | Facility: CLINIC | Age: 54
End: 2019-06-20
Attending: FAMILY MEDICINE
Payer: COMMERCIAL

## 2019-06-20 DIAGNOSIS — Z12.31 SCREENING MAMMOGRAM, ENCOUNTER FOR: ICD-10-CM

## 2019-06-20 PROCEDURE — 77067 SCR MAMMO BI INCL CAD: CPT

## 2019-07-31 ENCOUNTER — OFFICE VISIT (OUTPATIENT)
Dept: DERMATOLOGY | Facility: CLINIC | Age: 54
End: 2019-07-31
Payer: COMMERCIAL

## 2019-07-31 DIAGNOSIS — D22.9 MULTIPLE BENIGN NEVI: ICD-10-CM

## 2019-07-31 DIAGNOSIS — L82.0 INFLAMED SEBORRHEIC KERATOSIS: Primary | ICD-10-CM

## 2019-07-31 DIAGNOSIS — Z12.83 SKIN CANCER SCREENING: ICD-10-CM

## 2019-07-31 DIAGNOSIS — L81.4 SOLAR LENTIGINOSIS: ICD-10-CM

## 2019-07-31 DIAGNOSIS — L82.1 SEBORRHEIC KERATOSIS: ICD-10-CM

## 2019-07-31 DIAGNOSIS — D18.01 CHERRY ANGIOMA: ICD-10-CM

## 2019-07-31 ASSESSMENT — PAIN SCALES - GENERAL
PAINLEVEL: NO PAIN (0)
PAINLEVEL: NO PAIN (1)

## 2019-07-31 NOTE — PATIENT INSTRUCTIONS
Cryotherapy    What is it?    Use of a very cold liquid, such as liquid nitrogen, to freeze and destroy abnormal skin cells that need to be removed    What should I expect?    Tenderness and redness    A small blister that might grow and fill with dark purple blood. There may be crusting.    More than one treatment may be needed if the lesions do not go away.    How do I care for the treated area?    Gently wash the area with your hands when bathing.    Use a thin layer of Vaseline to help with healing. You may use a Band-Aid.     The area should heal within 7-10 days and may leave behind a pink or lighter color.     Do not use an antibiotic or Neosporin ointment.     You may take acetaminophen (Tylenol) for pain.     Call your Doctor if you have:    Severe pain    Signs of infection (warmth, redness, cloudy yellow drainage, and or a bad smell)    Questions or concerns    Who should I call with questions?       [facility]: [phone]       [facility]: [phone]       For urgent needs outside of business hours call the [facility] at [phone]        and ask for the dermatology resident on call

## 2019-07-31 NOTE — PROGRESS NOTES
[facility] Dermatology Note    Dermatology Problem List:  1. inflamed seborrheic keratosis, left upper chest s/p biopsy 11/30/17  2. Skin cancer screening 7/31/19, unremarkable    CC:   Chief Complaint   Patient presents with     Skin Check     Skin check, one lesion near hairline.     Date of Service: Jul 31, 2019    History of Present Illness:  Ms. Josette Gregg is a 54 year old female who presents for a skin check. She was last seen in the dermatology clinic on 11/30/17 when a neoplasm of uncertain behavior was biopsied on her left upper chest. The biopsy was most consistent with an inflamed seborrheic keratosis and no intervention was taken due to its benign nature.    Today she reports that the lesion on her left upper chest has healed well and not recurred. She also notes a lesion along the hairline that was removed before at an outside provider. She recently scratched the lesion with her hairbrush which caused it to bleed. Other than this lesion, she reports no areas of concern.     Otherwise she is feeling well, without additional skin concerns at this time.     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, right     Chronic right shoulder pain     Past Medical History:   Diagnosis Date     Congenital hip dysplasia     breech, treated     Left shoulder pain 7/16/2012     Past Surgical History:   Procedure Laterality Date     ARTHROPLASTY MINIMALLY INVASIVE HIP Left 2/16/2015    Procedure: ARTHROPLASTY MINIMALLY INVASIVE HIP;  Surgeon: Edson Oviedo MD;   [facility]     ARTHROSCOPY SHOULDER, OPEN BICEP TENODESIS REPAIR, COMBINED  4/9/2014    Procedure: COMBINED ARTHROSCOPY SHOULDER, OPEN BICEP TENODESIS REPAIR;;  Surgeon: Josh Love MD;  Location: [facility]     ARTHROSCOPY SHOULDER, OPEN ROTATOR CUFF REPAIR, COMBINED  4/9/2014    Procedure: COMBINED ARTHROSCOPY SHOULDER, OPEN ROTATOR CUFF REPAIR;  Diagnostic Left SHOULDER ARTHROSCOPY,  OPEN ROTATOR CUFF REPAIR, Biceps Tenodesis;  Surgeon: Josh Love MD;  Location: [facility]     GYN SURGERY       HYSTERECTOMY  1/2008     ORTHOPEDIC SURGERY       US JOINT INJECTION ASPIRATION MAJOR RIGHT  11/3/2010    left shoulder injection [place] see scan     Social History:  Patient works at [employer] with [employer].  She has had an average amount of sun exposure. She wears sunscreen often. Few sunburns as a child.   Worked as a  in high school.    Family History:  Mother has eczema.   No family history of skin cancer.    Medications:  Current Outpatient Medications   Medication Sig Dispense Refill     Multiple Vitamin (MULTIVITAMINS PO) Take 1 tablet by mouth daily       Allergies:  No Known Allergies     Review of Systems:  - Skin: As above in HPI. No additional skin concerns.    Physical exam:  Vitals: There were no vitals taken for this visit.  GEN: This is a well developed, well-nourished female in no acute distress, in a pleasant mood.    SKIN: Full skin, which includes the head/face, both arms, chest, back, abdomen,both legs, genitalia and/or groin buttocks, digits and/or nails, was examined.  -Tan to brown waxy stuck on papules with surrounding erythema on the left upper back (x 1) and left temple (x 1).   - Stuck on tan to brown papules on the face, back  - Freckling on upper back  - There are bright red some shaped papules scattered on the back, abdomen, and scalp  - Multiple regular brown pigmented macules and papules are identified on the trunk, extremities  - No other lesions  of concern on areas examined.     Impression/Plan:  1. Inflamed seborrheic keratosis, left upper back (x 1) and left temple (x 1)  Cryotherapy procedure note: After verbal consent and discussion of risks and benefits including but no limited to dyspigmentation/scar, blister, and pain, 2 were treated with 1-2mm freeze border for 2 cycles with liquid nitrogen. Post cryotherapy instructions were provided.     2. Fibrous papule, nose (x 2)    Benign nature reassured, no further intervention required    3. Seborrheic keratoses - face, back    No further intervention required. Patient to report changes.     4. Solar lentigines - upper back    No further intervention required. Patient to report changes.     5. Cherry angiomas - back, abdomen, scalp     No further intervention required. Patient to report changes.     6. Clinically benign nevi - trunk, extremities    No further intervention required. Patient to report changes.     Sun precaution was advised including the use of sun screens of SPF 30 or higher, sun protective clothing, and avoidance of tanning beds.      Follow-up in 2 years, earlier for new or changing lesions.      Staff Involved:  Scribe/Staff    Scribe Disclosure:   Brodie WALSH, am serving as a scribe to document services personally performed by Mery Frank PA-C, based on data collection and the provider's statements to me.    Provider Disclosure:   The documentation recorded by the scribe accurately reflects the services I personally performed and the decisions made by me.    All risks, benefits and alternatives were discussed with patient.  Patient is in agreement and understands the assessment and plan.  All questions were answered.  Sun Screen Education was given.   Return to Clinic in 2 yrs or sooner as needed.   Mery Frank PA-C   [facility]

## 2019-07-31 NOTE — LETTER
7/31/2019       RE: Josette Gregg  [address]  [facility] [address]     Dear Colleague,    Thank you for referring your patient, Josette Gregg, to the [facility]. Please see a copy of my visit note below.    [facility] Dermatology Note    Dermatology Problem List:  1. inflamed seborrheic keratosis, left upper chest s/p biopsy 11/30/17  2. Skin cancer screening 7/31/19, unremarkable    CC:   Chief Complaint   Patient presents with     Skin Check     Skin check, one lesion near hairline.     Date of Service: Jul 31, 2019    History of Present Illness:  Ms. Josette Gregg is a 54 year old female who presents for a skin check. She was last seen in the dermatology clinic on 11/30/17 when a neoplasm of uncertain behavior was biopsied on her left upper chest. The biopsy was most consistent with an inflamed seborrheic keratosis and no intervention was taken due to its benign nature.    Today she reports that the lesion on her left upper chest has healed well and not recurred. She also notes a lesion along the hairline that was removed before at an outside provider. She recently scratched the lesion with her hairbrush which caused it to bleed. Other than this lesion, she reports no areas of concern.     Otherwise she is feeling well, without additional skin concerns at this time.     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, right     Chronic right shoulder pain     Past Medical History:   Diagnosis  Date     Congenital hip dysplasia     breech, treated     Left shoulder pain 7/16/2012     Past Surgical History:   Procedure Laterality Date     ARTHROPLASTY MINIMALLY INVASIVE HIP Left 2/16/2015    Procedure: ARTHROPLASTY MINIMALLY INVASIVE HIP;  Surgeon: Edson Oviedo MD;  [facility]     ARTHROSCOPY SHOULDER, OPEN BICEP TENODESIS REPAIR, COMBINED  4/9/2014    Procedure: COMBINED ARTHROSCOPY SHOULDER, OPEN BICEP TENODESIS REPAIR;;  Surgeon: Josh Love MD;  [facility]     ARTHROSCOPY SHOULDER, OPEN ROTATOR CUFF REPAIR, COMBINED  4/9/2014    Procedure: COMBINED ARTHROSCOPY SHOULDER, OPEN ROTATOR CUFF REPAIR;  Diagnostic Left SHOULDER ARTHROSCOPY,  OPEN ROTATOR CUFF REPAIR, Biceps Tenodesis;  Surgeon: Josh Love MD;  Location: [facility]     GYN SURGERY       HYSTERECTOMY  1/2008     ORTHOPEDIC SURGERY       US JOINT INJECTION ASPIRATION MAJOR RIGHT  11/3/2010    left shoulder injection [place] see scan     Social History:  Patient works at [employer] with [employer].  She has had an average amount of sun exposure. She wears sunscreen often. Few sunburns as a child.   Worked as a  in high school.    Family History:  Mother has eczema.   No family history of skin cancer.    Medications:  Current Outpatient Medications   Medication Sig Dispense Refill     Multiple Vitamin (MULTIVITAMINS PO) Take 1 tablet by mouth daily       Allergies:  No Known Allergies     Review of Systems:  - Skin: As above in HPI. No additional skin concerns.    Physical exam:  Vitals: There were no vitals taken for this visit.  GEN: This is a well developed, well-nourished female in no acute distress, in a pleasant mood.    SKIN: Full skin, which includes the head/face, both arms, chest, back, abdomen,both legs, genitalia and/or groin buttocks, digits and/or nails, was examined.  -Tan to brown waxy stuck on papules with surrounding erythema on the left upper back (x 1) and left temple (x  1).   - Stuck on tan to brown papules on the face, back  - Freckling on upper back  - There are bright red some shaped papules scattered on the back, abdomen, and scalp  - Multiple regular brown pigmented macules and papules are identified on the trunk, extremities  - No other lesions of concern on areas examined.     Impression/Plan:  1. Inflamed seborrheic keratosis, left upper back (x 1) and left temple (x 1)  Cryotherapy procedure note: After verbal consent and discussion of risks and benefits including but no limited to dyspigmentation/scar, blister, and pain, 2 were treated with 1-2mm freeze border for 2 cycles with liquid nitrogen. Post cryotherapy instructions were provided.     2. Fibrous papule, nose (x 2)    Benign nature reassured, no further intervention required    3. Seborrheic keratoses - face, back    No further intervention required. Patient to report changes.     4. Solar lentigines - upper back    No further intervention required. Patient to report changes.     5. Cherry angiomas - back, abdomen, scalp     No further intervention required. Patient to report changes.     6. Clinically benign nevi - trunk, extremities    No further intervention required. Patient to report changes.     Sun precaution was advised including the use of sun screens of SPF 30 or higher, sun protective clothing, and avoidance of tanning beds.      Follow-up in 2 years, earlier for new or changing lesions.      Staff Involved:  Scribe/Staff    Scribe Disclosure:   Brodie WALSH, am serving as a scribe to document services personally performed by Mery Frank PA-C, based on data collection and the provider's statements to me.    Provider Disclosure:   The documentation recorded by the scribe accurately reflects the services I personally performed and the decisions made by me.    All risks, benefits and alternatives were discussed with patient.  Patient is in agreement and understands the assessment and plan.  All  questions were answered.  Sun Screen Education was given.   Return to Clinic in 2 yrs or sooner as needed.   Mery Frank PA-C   [facility]

## 2019-07-31 NOTE — NURSING NOTE
Dermatology Rooming Note    Josette Gregg's goals for this visit include:   Chief Complaint   Patient presents with     Skin Check     Skin check, one lesion near hairline.     Eli Nelson LPN

## 2019-09-29 ENCOUNTER — HEALTH MAINTENANCE LETTER (OUTPATIENT)
Age: 54
End: 2019-09-29

## 2019-11-27 PROBLEM — G89.29 CHRONIC RIGHT SHOULDER PAIN: Status: RESOLVED | Noted: 2018-10-29 | Resolved: 2019-11-27

## 2019-11-27 PROBLEM — M25.511 CHRONIC RIGHT SHOULDER PAIN: Status: RESOLVED | Noted: 2018-10-29 | Resolved: 2019-11-27

## 2019-11-27 NOTE — PROGRESS NOTES
Patient did not return to physical therapy and was lost follow up.  Current status of patient is unknown at this point.  Please see last therapy session for last reported subjective and/or objective information.  Patient is formally discharged from physical therapy.

## 2019-12-05 ENCOUNTER — OFFICE VISIT (OUTPATIENT)
Dept: PODIATRY | Facility: CLINIC | Age: 54
End: 2019-12-05
Payer: COMMERCIAL

## 2019-12-05 ENCOUNTER — ANCILLARY PROCEDURE (OUTPATIENT)
Dept: GENERAL RADIOLOGY | Facility: CLINIC | Age: 54
End: 2019-12-05
Attending: PODIATRIST
Payer: COMMERCIAL

## 2019-12-05 ENCOUNTER — TELEPHONE (OUTPATIENT)
Dept: FAMILY MEDICINE | Facility: CLINIC | Age: 54
End: 2019-12-05

## 2019-12-05 VITALS
WEIGHT: 129 LBS | BODY MASS INDEX: 23.74 KG/M2 | SYSTOLIC BLOOD PRESSURE: 102 MMHG | HEIGHT: 62 IN | DIASTOLIC BLOOD PRESSURE: 62 MMHG

## 2019-12-05 DIAGNOSIS — M79.671 RIGHT FOOT PAIN: ICD-10-CM

## 2019-12-05 DIAGNOSIS — M79.671 RIGHT FOOT PAIN: Primary | ICD-10-CM

## 2019-12-05 DIAGNOSIS — M21.619 BUNION: ICD-10-CM

## 2019-12-05 PROCEDURE — 73630 X-RAY EXAM OF FOOT: CPT | Mod: RT

## 2019-12-05 PROCEDURE — 99203 OFFICE O/P NEW LOW 30 MIN: CPT | Performed by: PODIATRIST

## 2019-12-05 ASSESSMENT — MIFFLIN-ST. JEOR: SCORE: 1143.39

## 2019-12-05 NOTE — LETTER
2019         RE: Josette Gregg  [address]  [facility] [address]        Dear Colleague,    Thank you for referring your patient, Josette Gregg, to the [facility]. Please see a copy of my visit note below.      ASSESSMENT/PLAN:    Encounter Diagnoses   Name Primary?     Right foot pain Yes     Bunion      I reviewed the XR images with the patient.  The relevant anatomy and function was discussed, in relation to the problem.      Conservative cares were reviewed including improved foot support, proper shoes, anti-inflammatory measures, padding, and the avoidance of barefoot walking.  An informational handout on bunions provided.      Rigid soled shoes were recommended to offload the forefoot during the propulsive phase of gait.     No indications for surgery.    Body mass index is 23.59 kg/m .      Jacky Malin DPM, SHRUTI, MS    [place] [facility]/Foot & Ankle Surgery      ____________________________________________________________________    HPI:         Chief Complaint: right foot pain  She specifies the first metatarsophalangeal joint region  Onset of problem: 1 month  Pain/ discomfort is described as:  Deep ache, throbbing  Ratin/10 at worst   Frequency:  Intermittent, 1-2x/ week    Previous treatment: soaking her foot  She inquires if she has a bunion  *  Patient Active Problem List   Diagnosis     CARDIOVASCULAR SCREENING; LDL GOAL LESS THAN 160     Left shoulder pain     Arm pain, left     Other postprocedural status(V45.89)     Pain in joint, shoulder region     Anemia associated with acute blood loss     OA (osteoarthritis) of hip     Hip pain     S/P total hip arthroplasty     Seasonal allergic rhinitis     Benign neoplasm of scalp and skin of neck     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, right   *  *  Past Surgical History:   Procedure Laterality Date     ARTHROPLASTY MINIMALLY INVASIVE HIP Left 2015    Procedure: ARTHROPLASTY  MINIMALLY INVASIVE HIP;  Surgeon: Edson Oviedo MD;  Location: [facility]     ARTHROSCOPY SHOULDER, OPEN BICEP TENODESIS REPAIR, COMBINED  4/9/2014    Procedure: COMBINED ARTHROSCOPY SHOULDER, OPEN BICEP TENODESIS REPAIR;;  Surgeon: Josh Love MD;  Location: [facility]     ARTHROSCOPY SHOULDER, OPEN ROTATOR CUFF REPAIR, COMBINED  4/9/2014    Procedure: COMBINED ARTHROSCOPY SHOULDER, OPEN ROTATOR CUFF REPAIR;  Diagnostic Left SHOULDER ARTHROSCOPY,  OPEN ROTATOR CUFF REPAIR, Biceps Tenodesis;  Surgeon: Josh Love MD;  Location: [facility]     GYN SURGERY       HYSTERECTOMY  1/2008     ORTHOPEDIC SURGERY       US JOINT INJECTION ASPIRATION MAJOR RIGHT  11/3/2010    left shoulder injection [place] see scan   *  *  Current Outpatient Medications   Medication Sig Dispense Refill     Multiple Vitamin (MULTIVITAMINS PO) Take 1 tablet by mouth daily         ROS:     A 10-point review of systems was performed and is positive for that noted above in the HPI and as seen below.  All other areas are negative.     Numbness in feet?  no   Calf pain with walking? no  Recent foot/ankle injury? no  Weight change over past 12 months? no  Self perception as overweight? no  Recent flu-like symptoms? no  Joint pain other than feet ? no    Social History: Employment:  admin;  Exercise/Physical activity:  2-3x/ week;  Tobacco use:  no  Social History     Socioeconomic History     Marital status:      Spouse name: Not on file     Number of children: 1     Years of education: Not on file     Highest education level: Not on file   Occupational History     Not on file   Social Needs     Financial resource strain: Not on file     Food insecurity:     Worry: Not on file     Inability: Not on file     Transportation needs:     Medical: Not on file     Non-medical: Not on file   Tobacco Use     Smoking status: Never Smoker     Smokeless tobacco: Never Used   Substance and Sexual Activity     Alcohol use: Yes     Comment:  "occasional     Drug use: No     Sexual activity: Yes     Partners: Male     Birth control/protection: Surgical   Lifestyle     Physical activity:     Days per week: Not on file     Minutes per session: Not on file     Stress: Not on file   Relationships     Social connections:     Talks on phone: Not on file     Gets together: Not on file     Attends Restorationism service: Not on file     Active member of club or organization: Not on file     Attends meetings of clubs or organizations: Not on file     Relationship status: Not on file     Intimate partner violence:     Fear of current or ex partner: Not on file     Emotionally abused: Not on file     Physically abused: Not on file     Forced sexual activity: Not on file   Other Topics Concern     Parent/sibling w/ CABG, MI or angioplasty before 65F 55M? No   Social History Narrative     Not on file       Family history:  Family History   Problem Relation Age of Onset     Rheumatoid Arthritis Mother      Melanoma No family hx of      Skin Cancer No family hx of        Rheumatoid arthritis:  parent  Foot Problems: cross over toe  Diabetes: no      EXAM:    Vitals: /62   Ht 1.575 m (5' 2\")   Wt 58.5 kg (129 lb)   BMI 23.59 kg/m     BMI: Body mass index is 23.59 kg/m .  Height: 5' 2\"    Constitutional/ general:  Pt is in no apparent distress, appears well-nourished.  Cooperative with history and physical exam.     Vascular:  Pedal pulses are palpable bilaterally for both the DP and PT arteries.  CFT < 3 sec.  No edema.  Pedal hair growth noted.     Neuro:  Alert and oriented x 3. Coordinated gait.  Light touch sensation is intact to the L4, L5, S1 distributions. No obvious deficits.  No evidence of neurological-based weakness, spasticity, or contracture in the lower extremities.     Derm: Normal texture and turgor.  No erythema, ecchymosis, or cyanosis.  No open lesions.     Musculoskeletal:    Lower extremity muscle strength is normal.  Patient is ambulatory " without an assistive device or brace .  No gross deformities. Normal right first metatarsophalangeal joint ROM. Clinical bunion wit prominent first metatarsal head and hallux abduction.      Radiographic Exam:  X-Ray Findings:  I personally reviewed the right foot films. Her foot looks normal. First intermetatarsal ankle measuring 9 degrees.  Hallux well aligned on the first metatarsal head.       RIGHT FOOT FOUR VIEWS  12/5/2019 3:13 PM     HISTORY:  Right foot first MTPJ pain; bunion. Right foot pain.     COMPARISON:  None.     FINDINGS:  No fracture or osseous lesion is seen. The joint spaces are  well preserved. No soft tissue pathology is seen.                                                                      IMPRESSION:  Unremarkable examination.     JADEN THOMPSON MD          Again, thank you for allowing me to participate in the care of your patient.        Sincerely,        Jacky Malin DPM

## 2019-12-05 NOTE — TELEPHONE ENCOUNTER
Reason for Call:  Form, our goal is to have forms completed with 72 hours, however, some forms may require a visit or additional information.    Type of letter, form or note:  Orthopaedic medical Consultation     Who is the form from?: Patient    Where did the form come from: Patient or family brought in       What clinic location was the form placed at?: [facility]    Where the form was placed: Forms folder Box/Folder    What number is listed as a contact on the form?: [phone]       Additional comments: Please complete and fax to [phone].    Call taken on 12/5/2019 at 2:55 PM by Ras Jerry

## 2019-12-05 NOTE — PATIENT INSTRUCTIONS
Thank you for choosing [facility] Podiatry / Foot & Ankle Surgery!    DR. COLLAZO'S CLINIC LOCATIONS     MONDAY - [place] WEDNESDAY (AM ONLY) - [place]   [address] [place] [address] [address] [address]   [phone] / [phone] / [phone]       [facility] [phone]   [address] APPOINTMENTS: [phone]   [place] [address] BILLING QUESTIONS: [phone] / [phone]       BUNION (HALLUX ABDUCTO VALGUS)  A bunion is caused by muscle imbalance. The great toe is pulled toward the smaller toes. The metatarsal head is pushed outward creating a lump on the side of your foot. Imbalance is the result of foot structure and instability.   Bunions do not improve with time. They usually enlarge, however this is a fairly slow process. Shoes do not necessarily cause bunions, however, they can hasten development and definitely cause bunions to hurt.   Bunions often run in families. We inherit a certain foot structure, which may be predisposed to bunion development.   Bunion pain is likely a combination of shoes rubbing on the bump, nerve irritation, compression between the toes, joint misalignment, arthritis and altered gait.   SYMPTOMS   Bunions are usually termed mild, moderate or severe. Just because you have a bunion does not mean you have to have pain. There are some people with very severe bunions and no pain and people with mild bunions and a lot of pain.   - Pain on the inside of your foot at the big toe joint (1st MTPJ)   - Swelling on the inside of your foot at the big toe joint   - Redness on the inside of your foot at the big toe joint   - Numbness or burning in the big toe (hallux)   - Decreased motion at the big toe joint   - Painful bursa (fluid-filled sac) on the inside of your foot at the big toe joint   - Pain while wearing shoes -especially shoes too narrow or with high heels    -  Pain during activities   - Corn in between the big toe and second toe   - Callous formation on the side or bottom of the big toe or big toe joint   - Callous under the second toe joint (2nd MTPJ)   - Pain in the second toe joint   TREATMENT  Conservative (non-surgical) treatment will not make the bunion go away, but it will hopefully decrease the signs and symptoms you have and help you get rid of the pain and get you back to your activities.   1.  Wider shoes or extra depth shoes: Most bunion pain can be improved simply by wearing compatible shoes. People with bunions cannot choose footwear simply because they like the style. Your bunion should determine which shoes are to be worn. Wide shoes with nonirritating seams,soft leather and a square toe box are most compatible with a bunion. Shoes should fit appropriately right out of the box but may need to be professionally stretched and modified to accommodate the bump. Heels, dress shoes and shoes with pointed toes will not be comfortable.   2. NSAIDs   3.  Arch supports, custom inserts, padding, splints, toe spacers : Most bunion pain can be improved simply by wearing compatible shoes. People with bunions cannot choose footwear simply because they like the style. Your bunion should determine which shoes are to be worn. Wide shoes with nonirritating seams,soft leather and a square toe box are most compatible with a bunion. Shoes should fit appropriately right out of the box but may need to be professionally stretched and modified to accommodate the bump. Heels, dress shoes and shoes with pointed toes will not be comfortable.   4.  Change activities   5.  Physical therapy  SURGERY  Surgical treatment for bunions is sometimes needed. If you are limited by pain, cannot fit in shoes comfortably and are not able to do your daily activities then surgery may be a good option for you. There are many different surgical procedures to repair bunions. Your foot and ankle surgeon  will review your foot exam findings, your x-rays, your age, your health, your lifestyle, your physical activity level and discuss with you which procedure he or she would recommend. Surgical procedures for bunions range from soft tissue repair to cutting and realigning the bones. It is not recommended that you have bunion surgery for cosmetic reasons (you do not like how your foot looks) or because you want to fit in a certain pair of shoes; There is the risk that even after surgery, the bunion will reoccur 9-10% of the time.   Bunion surgery involves cutting and repositioning the bones surrounding the bunion. Pins and screws are used to hold the bones in place during the healing process. The goal of bunion surgery is to reduce the size of the bunion bump. Realignment of the toe and joint is attempted.     Some first toes cannot be forced back into normal alignment even with surgery. Surgery is helpful in most cases but does not necessarily create a normal foot.   Healing after surgery requires about six weeks of protection. This allows the bone to heal. Maximum recovery takes about one year. The scar tissue and jOint structures require this amount of time to finish the healing process. Expect stiffness, swelling and numbness during that time frame. Bunion surgery does involve side effects. Some side effects are predictable and others are less common but do occur. A scar will be visible and could be irritated by shoes. The shoe may rub on the screw or internal pin requiring surgical removal of these fixation devices. The screw and pin would likely be left in place for a full year. The first toe may loose motion after bunion surgery. The amount of stiffness is variable. Some people never regain normal motion of the first toe. This is due to scar tissue inherent to any surgery. The first toe may drift toward the second toe or away from the second toe. Spreading of the first and second toes is a rare occurrence after  bunion surgery. This can be quite bothersome and would need to be surgically repaired. Toe drift toward the second toe could result in a recurrent bunion and revision surgery. Joint fusion is one option to correct an unstable, drifting toe. This procedure straightens the toe, however, no motion remains. Fusion may be necessary to correct complications of bunion surgery or as the original procedure in severe cases.   All surgical procedures involve risk of infection, numbness, pain, delayed healing, osteotomy dislocation, blood clots, continued foot pain, etc. Bunion surgery is quite complex and should not be taken lightly.   Any skin incision can lead to infection. Deep infection might involve the bone and thus repeat surgery and six weeks of IV antibiotics. Scar tissue can cause nerve pain or numbness. This is generally temporary but can be permanent. We do not have treatments that cure nerve problems. Second toe pain could be related to altered mechanics and pressure transferred to the second toe. Most feet with bunions have pre-existing second toe problems. Delayed bone healing would lengthen the healing time. Some bones simply do not heal. This requires repeat surgery, electronic bone stimulation and/or extended protection. Smokers have an approximate 20% chance of poor bone healing. This is double that of a non-smoker. The bone cut may displace. This may need to be repaired with a second operation. Displacement can cause jOint malalignment. Immobility after surgery can cause blood clots in the legs and lungs. This could result in death.   Foot pain is complex. Most feet hurt for more than one reason. Fixing the bunion would not necessarily create a pain free foot. Appropriate shoes, healthy body weight, avoidance of bare foot walking and moderation of activity will always be necessary to enjoy foot comfort. Your bunion may involve arthritis, which is incurable even with surgery. Long standing bunions often  involve chronic irritation to the surrounding nerves. Nerve pain may not resolve even with reducing the bunion bump since permanent nerve damage may be present   Bunion surgery is nevertheless quite successful. Most surgical patients are pleased with their foot following bunion surgery. Many of the issues described above can be controlled by taking proper care of your foot during the healing process.   Your surgeon would be happy to fully describe any of the above issues. You should pursue a full understanding of the operation,recovery process and any potential problems that could develop.   PREVENTION  1.  Do not wear high heels if there is a family history of bunions.  2.  Wear shoes that have enough width and depth in the toe box  Here are exercises that may benefit people with bunions:   Toe stretches - Stretching out your toes can help keep them limber and offset foot pain. To stretch your toes, point your toes straight ahead for 5 seconds and then curl them under for 5 seconds. Repeat these stretches 10 times. These exercises can be especially beneficial if you also have hammertoes, or chronically bent toes, in addition to a bunion.   Toe flexing and lucero - Press your toes against a hard surface such as a wall, to flex and stretch them; hold the position for 10 seconds and repeat three to four times. Then flex your toes in the opposite direction; hold the position for 10 seconds and repeat three to four times.   Stretching your big toe - Using your fingers to gently pull your big toe over into proper alignment can be helpful as well. Hold your toe in position for 10 seconds and repeat three to four times.   Resistance exercises - Wrap either a towel or belt around your big toe and use it to pull your big toe toward you while simultaneously pushing forward, against the towel, with your big toe.   Ball roll - To massage the bottom of your foot, sit down, place a golf ball on the floor under your foot, and  roll it around under your foot for two minutes. This can help relieve foot strain and cramping.   Towel curls - You can strengthen your toes by spreading out a small towel on the floor, curling your toes around it, and pulling it toward you. Repeat five times. Gripping objects with your toes like this can help keep your foot flexible.   Picking up marbles - Another gripping exercise you can perform to keep your foot flexible is picking up marbles with your toes. Do this by placing 20 marbles on the floor in front of you and use your foot to pick the marbles up one by one and place them in a bowl.   Walking along the beach - Whenever possible, spend time walking on sand. This can give you a gentle foot massage and also help strengthen your toes. This is especially beneficial for people who have arthritis associated with their bunions.     Adequate width shoes  Stiffer soled shoes        FYI: BODY WEIGHT AND YOUR FEET  The following information is included in the after visit summary for all patients. Body weight can be a sensitive issue to discuss in clinic, but we think the following information is very important. Although we focus on the feet and ankles, we do support the overall health of our patients.     Many things can cause foot and ankle problems. Foot structure, activity level, foot mechanics and injuries are common causes of pain. One very important issue that often goes unmentioned, is body weight. Extra weight can cause increased stress on muscles, ligaments, bones and tendons. Sometimes just a few extra pounds is all it takes to put one over her/his threshold. Without reducing that stress, it can be difficult to alleviate pain. As Foot & Ankle specialists, our job is addressing the lower extremity problem and possible causes. Regarding extra body weight, we encourage patients to discuss diet and weight management plans with their primary care doctors. It is this team approach that gives you the best  opportunity for pain relief and getting you back on your feet.      [facility] has a Comprehensive Weight Management Program. This program includes counseling, education, non-surgical and surgical approaches to weight loss. If you are interested in learning more either talk to you primary care provider or call [phone].

## 2019-12-06 NOTE — PROGRESS NOTES
ASSESSMENT/PLAN:    Encounter Diagnoses   Name Primary?     Right foot pain Yes     Bunion      I reviewed the XR images with the patient.  The relevant anatomy and function was discussed, in relation to the problem.      Conservative cares were reviewed including improved foot support, proper shoes, anti-inflammatory measures, padding, and the avoidance of barefoot walking.  An informational handout on bunions provided.      Rigid soled shoes were recommended to offload the forefoot during the propulsive phase of gait.     No indications for surgery.    Body mass index is 23.59 kg/m .      Jacky Malin DPM, FACFAS, MS    [place] [place] [facility]/Foot & Ankle Surgery      ____________________________________________________________________    HPI:         Chief Complaint: right foot pain  She specifies the first metatarsophalangeal joint region  Onset of problem: 1 month  Pain/ discomfort is described as:  Deep ache, throbbing  Ratin/10 at worst   Frequency:  Intermittent, 1-2x/ week    Previous treatment: soaking her foot  She inquires if she has a bunion  *  Patient Active Problem List   Diagnosis     CARDIOVASCULAR SCREENING; LDL GOAL LESS THAN 160     Left shoulder pain     Arm pain, left     Other postprocedural status(V45.89)     Pain in joint, shoulder region     Anemia associated with acute blood loss     OA (osteoarthritis) of hip     Hip pain     S/P total hip arthroplasty     Seasonal allergic rhinitis     Benign neoplasm of scalp and skin of neck     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, right   *  *  Past Surgical History:   Procedure Laterality Date     ARTHROPLASTY MINIMALLY INVASIVE HIP Left 2015    Procedure: ARTHROPLASTY MINIMALLY INVASIVE HIP;  Surgeon: Edson Oviedo MD;  [facility]     ARTHROSCOPY SHOULDER, OPEN BICEP TENODESIS REPAIR, COMBINED  2014    Procedure: COMBINED ARTHROSCOPY SHOULDER, OPEN BICEP TENODESIS REPAIR;;  Surgeon: Josh Love  MD;  Location: [facility]     ARTHROSCOPY SHOULDER, OPEN ROTATOR CUFF REPAIR, COMBINED  4/9/2014    Procedure: COMBINED ARTHROSCOPY SHOULDER, OPEN ROTATOR CUFF REPAIR;  Diagnostic Left SHOULDER ARTHROSCOPY,  OPEN ROTATOR CUFF REPAIR, Biceps Tenodesis;  Surgeon: Josh Love MD;  Location: [facility]     GYN SURGERY       HYSTERECTOMY  1/2008     ORTHOPEDIC SURGERY       US JOINT INJECTION ASPIRATION MAJOR RIGHT  11/3/2010    left shoulder injection Jagdish see scan   *  *  Current Outpatient Medications   Medication Sig Dispense Refill     Multiple Vitamin (MULTIVITAMINS PO) Take 1 tablet by mouth daily         ROS:     A 10-point review of systems was performed and is positive for that noted above in the HPI and as seen below.  All other areas are negative.     Numbness in feet?  no   Calf pain with walking? no  Recent foot/ankle injury? no  Weight change over past 12 months? no  Self perception as overweight? no  Recent flu-like symptoms? no  Joint pain other than feet ? no    Social History: Employment:  admin;  Exercise/Physical activity:  2-3x/ week;  Tobacco use:  no  Social History     Socioeconomic History     Marital status:      Spouse name: Not on file     Number of children: 1     Years of education: Not on file     Highest education level: Not on file   Occupational History     Not on file   Social Needs     Financial resource strain: Not on file     Food insecurity:     Worry: Not on file     Inability: Not on file     Transportation needs:     Medical: Not on file     Non-medical: Not on file   Tobacco Use     Smoking status: Never Smoker     Smokeless tobacco: Never Used   Substance and Sexual Activity     Alcohol use: Yes     Comment: occasional     Drug use: No     Sexual activity: Yes     Partners: Male     Birth control/protection: Surgical   Lifestyle     Physical activity:     Days per week: Not on file     Minutes per session: Not on file     Stress: Not on file   Relationships      "Social connections:     Talks on phone: Not on file     Gets together: Not on file     Attends Congregation service: Not on file     Active member of club or organization: Not on file     Attends meetings of clubs or organizations: Not on file     Relationship status: Not on file     Intimate partner violence:     Fear of current or ex partner: Not on file     Emotionally abused: Not on file     Physically abused: Not on file     Forced sexual activity: Not on file   Other Topics Concern     Parent/sibling w/ CABG, MI or angioplasty before 65F 55M? No   Social History Narrative     Not on file       Family history:  Family History   Problem Relation Age of Onset     Rheumatoid Arthritis Mother      Melanoma No family hx of      Skin Cancer No family hx of        Rheumatoid arthritis:  parent  Foot Problems: cross over toe  Diabetes: no      EXAM:    Vitals: /62   Ht 1.575 m (5' 2\")   Wt 58.5 kg (129 lb)   BMI 23.59 kg/m    BMI: Body mass index is 23.59 kg/m .  Height: 5' 2\"    Constitutional/ general:  Pt is in no apparent distress, appears well-nourished.  Cooperative with history and physical exam.     Vascular:  Pedal pulses are palpable bilaterally for both the DP and PT arteries.  CFT < 3 sec.  No edema.  Pedal hair growth noted.     Neuro:  Alert and oriented x 3. Coordinated gait.  Light touch sensation is intact to the L4, L5, S1 distributions. No obvious deficits.  No evidence of neurological-based weakness, spasticity, or contracture in the lower extremities.     Derm: Normal texture and turgor.  No erythema, ecchymosis, or cyanosis.  No open lesions.     Musculoskeletal:    Lower extremity muscle strength is normal.  Patient is ambulatory without an assistive device or brace .  No gross deformities. Normal right first metatarsophalangeal joint ROM. Clinical bunion wit prominent first metatarsal head and hallux abduction.      Radiographic Exam:  X-Ray Findings:  I personally reviewed the right foot " films. Her foot looks normal. First intermetatarsal ankle measuring 9 degrees.  Hallux well aligned on the first metatarsal head.       RIGHT FOOT FOUR VIEWS  12/5/2019 3:13 PM     HISTORY:  Right foot first MTPJ pain; bunion. Right foot pain.     COMPARISON:  None.     FINDINGS:  No fracture or osseous lesion is seen. The joint spaces are  well preserved. No soft tissue pathology is seen.                                                                      IMPRESSION:  Unremarkable examination.     JADEN THOMPSON MD

## 2019-12-10 NOTE — TELEPHONE ENCOUNTER
Form fax to [facility] at [phone]. Copy form and place in fax file folder. Mail original to patient address.    Roman Blanc MA

## 2019-12-10 NOTE — TELEPHONE ENCOUNTER
I received orthopedic medical consultation form for Josette using antibiotic prophylactic recommendations from 2009. Guidelines have changes (see below). I wrote note to that effect on form, see scan.  Sincerely,  Dr. Jessica Hogue MD  12/10/2019      There is no evidence to suggest that patients with orthopedic hardware undergoing procedures should receive antibiotic prophylaxis in the absence of other indications, such as heart valve disease requiring endocarditis prophylaxis or a surgical procedure for which antibiotics are given to prevent a surgical site infection.  Dental procedures -- Patients with orthopedic implants should maintain good dental hygiene [30], and oral infections in patients with orthopedic implants should be treated promptly. Dental procedures are not associated with an increased risk of orthopedic hardware infection, and use of routine antibiotic prophylaxis prior to dental procedures does not alter the risk of subsequent orthopedic hardware infection [31-33].  There have been fewer than 25 reported cases of late-onset prosthetic joint infection (PJI) after dental procedures, and the association between dental treatment and PJI in these cases is circumstantial. In addition, there are no experimental observations suggesting a link between bacteremia induced from a dental procedure and PJI. In a case-control study based on Medicare Current Beneficiary Survey (BS) data from 1997 to 2006, including cases with PJI and matched controls with total arthroplasty but no PJI, there was no significant association between dental procedures and PJI risk [34].  In view of these observations, the American Academy of Oral Medicine [35], the American Dental Association (ADA) in conjunction with the American Academy of Orthopedic Surgeons (AAOS) [30,36], and the [place] Society for Antimicrobial Chemotherapy [37] all advise against universal use of antimicrobial prophylaxis prior to dental procedures  for prevention of PJI.  In 2013, the ADA and AAOS published a joint guideline on the prevention of orthopedic implant infections in patients undergoing dental procedures; it states that there is no convincing evidence to support routine use of prophylactic antibiotics in patients with prosthetic joints who undergo dental procedures [30]. Subsequently, in 2015, the ADA [place] on Scientific Affairs published a clinical practice guideline to clarify the preceding ADA and AAOS joint guideline; it states that, in general, prophylactic antibiotics are not recommended prior to dental procedures for patients with prosthetic joint implants to prevent prosthetic joint infection [36].  We advise against routine use of antimicrobial therapy for patients with orthopedic hardware undergoing routine dental procedures, such as cleaning, scaling of teeth, or filling of a dental cavity. Dental infections should be treated promptly.

## 2020-09-08 ENCOUNTER — ANCILLARY PROCEDURE (OUTPATIENT)
Dept: MAMMOGRAPHY | Facility: CLINIC | Age: 55
End: 2020-09-08
Attending: FAMILY MEDICINE
Payer: COMMERCIAL

## 2020-09-08 DIAGNOSIS — Z12.31 VISIT FOR SCREENING MAMMOGRAM: ICD-10-CM

## 2020-09-08 PROCEDURE — 77067 SCR MAMMO BI INCL CAD: CPT

## 2021-01-14 ENCOUNTER — HEALTH MAINTENANCE LETTER (OUTPATIENT)
Age: 56
End: 2021-01-14

## 2021-06-15 ENCOUNTER — OFFICE VISIT (OUTPATIENT)
Dept: FAMILY MEDICINE | Facility: CLINIC | Age: 56
End: 2021-06-15
Payer: COMMERCIAL

## 2021-06-15 ENCOUNTER — NURSE TRIAGE (OUTPATIENT)
Dept: NURSING | Facility: CLINIC | Age: 56
End: 2021-06-15

## 2021-06-15 VITALS
SYSTOLIC BLOOD PRESSURE: 102 MMHG | BODY MASS INDEX: 23.41 KG/M2 | OXYGEN SATURATION: 97 % | RESPIRATION RATE: 14 BRPM | TEMPERATURE: 99.8 F | DIASTOLIC BLOOD PRESSURE: 69 MMHG | WEIGHT: 128 LBS | HEART RATE: 78 BPM

## 2021-06-15 DIAGNOSIS — R82.90 NONSPECIFIC FINDING ON EXAMINATION OF URINE: ICD-10-CM

## 2021-06-15 DIAGNOSIS — N30.01 ACUTE CYSTITIS WITH HEMATURIA: ICD-10-CM

## 2021-06-15 DIAGNOSIS — R31.0 GROSS HEMATURIA: Primary | ICD-10-CM

## 2021-06-15 LAB
ALBUMIN UR-MCNC: 30 MG/DL
APPEARANCE UR: ABNORMAL
BACTERIA #/AREA URNS HPF: ABNORMAL /HPF
BILIRUB UR QL STRIP: NEGATIVE
COLOR UR AUTO: YELLOW
GLUCOSE UR STRIP-MCNC: NEGATIVE MG/DL
HGB UR QL STRIP: ABNORMAL
KETONES UR STRIP-MCNC: NEGATIVE MG/DL
LEUKOCYTE ESTERASE UR QL STRIP: ABNORMAL
NITRATE UR QL: POSITIVE
NON-SQ EPI CELLS #/AREA URNS LPF: ABNORMAL /LPF
PH UR STRIP: 6 PH (ref 5–7)
RBC #/AREA URNS AUTO: ABNORMAL /HPF
SOURCE: ABNORMAL
SP GR UR STRIP: 1.02 (ref 1–1.03)
UROBILINOGEN UR STRIP-ACNC: 0.2 EU/DL (ref 0.2–1)
WBC #/AREA URNS AUTO: ABNORMAL /HPF

## 2021-06-15 PROCEDURE — 87186 SC STD MICRODIL/AGAR DIL: CPT | Performed by: FAMILY MEDICINE

## 2021-06-15 PROCEDURE — 99213 OFFICE O/P EST LOW 20 MIN: CPT | Performed by: FAMILY MEDICINE

## 2021-06-15 PROCEDURE — 81001 URINALYSIS AUTO W/SCOPE: CPT | Performed by: FAMILY MEDICINE

## 2021-06-15 PROCEDURE — 87086 URINE CULTURE/COLONY COUNT: CPT | Performed by: FAMILY MEDICINE

## 2021-06-15 PROCEDURE — 87088 URINE BACTERIA CULTURE: CPT | Performed by: FAMILY MEDICINE

## 2021-06-15 RX ORDER — NITROFURANTOIN 25; 75 MG/1; MG/1
100 CAPSULE ORAL 2 TIMES DAILY
Qty: 10 CAPSULE | Refills: 0 | Status: SHIPPED | OUTPATIENT
Start: 2021-06-15 | End: 2021-06-20

## 2021-06-15 NOTE — PROGRESS NOTES
Assessment & Plan     Gross hematuria  Acute cystitis with hematuria  -Symptoms not consistent with kidney stone. Will treat for UTI, UA pending  -Discussed should feel symptom improvement in the next 24-48 hours on abx.   -Follow-up if new or worsening symptoms or symptoms not improved after abx course.   - UA with Microscopic reflex to Culture  - nitroFURantoin macrocrystal-monohydrate (MACROBID) 100 MG capsule  Dispense: 10 capsule; Refill: 0    DO KRAIG Ruffin [facility]    Veronica Finch is a 56 year old who presents for the following health issues:    HPI     Genitourinary - Female  Onset/Duration:  X 1 day   Description:Pt reporting urinary frequency, urgency starting at 315 a.m. today. Reporting pink tinge urine now. Afebrile.  Denies abdominal or back pain. Reporting some dysuria.   Denies previous history.  Painful urination (Dysuria): yes           Frequency: no  Blood in urine (Hematuria): YES  Delay in urine (Hesitency): no  Intensity: mild  Progression of Symptoms:  worsening  Accompanying Signs & Symptoms:  Fever/chills: no  Flank pain: no  Nausea and vomiting: no  Vaginal symptoms: none  Abdominal/Pelvic Pain: no  History:   History of frequent UTI s: no  History of kidney stones: no  Sexually Active: YES  Possibility of pregnancy: No  Precipitating or alleviating factors: None  Therapies tried and outcome:  None     Review of Systems   CONSTITUTIONAL: NEGATIVE for fever, chills, change in weight  ENT/MOUTH: NEGATIVE for ear, mouth and throat problems  CV: NEGATIVE for chest pain, palpitations or peripheral edema  NEURO: NEGATIVE for weakness, dizziness or paresthesias      Objective    /69 (BP Location: Left arm, Patient Position: Sitting, Cuff Size: Adult Regular)   Pulse 78   Temp 99.8  F (37.7  C) (Tympanic)   Resp 14   Wt 58.1 kg (128 lb)   SpO2 97%   BMI 23.41 kg/m    Body mass index is 23.41 kg/m .  Physical Exam   GENERAL: healthy, alert and  no distress  RESP: lungs clear to auscultation - no rales, rhonchi or wheezes  CV: regular rate and rhythm, normal S1 S2, no S3 or S4, no murmur, click or rub, no peripheral edema and peripheral pulses strong  ABDOMEN: soft, mild suprapubic tenderness to palpation, no hepatosplenomegaly, no masses and bowel sounds normal  SKIN: no suspicious lesions or rashes  BACK: no CVA tenderness, no paralumbar tenderness

## 2021-06-15 NOTE — TELEPHONE ENCOUNTER
Patient calling reporting urinary frequency, urgency starting at 315 a.m. today. Reporting pink tinge urine now. Afebrile.  Denies abdominal or back pain. Reporting some dysuria.   Denies previous history.  Disposition to see provider with in 24 hours.    Reviewed with patient if she is unable to pass more then a few drops of urine with in 4 hours to be seen in the ER. Advised to call with any increase or change in symptoms.    Transferred patient to Central Scheduling.       Herminia Khoury RN  [facility]      COVID 19 Nurse Triage Plan/Patient Instructions    Please be aware that novel coronavirus (COVID-19) may be circulating in the community. If you develop symptoms such as fever, cough, or SOB or if you have concerns about the presence of another infection including coronavirus (COVID-19), please contact your health care provider or visit [email or web address].     Disposition/Instructions    In-Person Visit with provider recommended. Reference Visit Selection Guide.    Thank you for taking steps to prevent the spread of this virus.  o Limit your contact with others.  o Wear a simple mask to cover your cough.  o Wash your hands well and often.    Resources    [facility]: About COVID-19: [email or web address]    CDC: What to Do If You're Sick: www.cdc.gov/coronavirus/2019-ncov/about/steps-when-sick.html    CDC: Ending Home Isolation: www.cdc.gov/coronavirus/2019-ncov/hcp/disposition-in-home-patients.html     CDC: Caring for Someone: www.cdc.gov/coronavirus/2019-ncov/if-you-are-sick/care-for-someone.html     [facility]: Interim Guidance for Hospital Discharge to Home: www.health.Count includes the [facility].mn.us/diseases/coronavirus/hcp/hospdischarge.pdf    [facility] clinical trials (COVID-19 research studies): [email or web address]-trials     Below are the COVID-19 hotlines at the [place] Department of Health ([facility]). Interpreters are available.   o For health questions: Call  [phone] or 1-236.983.4268 (7 a.m. to 7 p.m.)  o For questions about schools and childcare: Call 510-484-3303 or 1-796.955.8375 (7 a.m. to 7 p.m.)                        Reason for Disposition    Pain or burning with passing urine    Additional Information    Negative: Shock suspected (e.g., cold/pale/clammy skin, too weak to stand, low BP, rapid pulse)    Negative: Sounds like a life-threatening emergency to the triager    Negative: Recent back or abdominal injury    Negative: Recent genital injury    Negative: [1] Unable to urinate (or only a few drops) > 4 hours AND [2] bladder feels very full (e.g., palpable bladder or strong urge to urinate)    Negative: Passing pure blood or large blood clots (i.e., size > a dime) (Exception: dali or small strands)    Negative: Fever > 100.4 F (38.0 C)    Negative: Patient sounds very sick or weak to the triager    Negative: Known sickle cell disease    Negative: Taking Coumadin (warfarin) or other strong blood thinner, or known bleeding disorder (e.g., thrombocytopenia)    Negative: Side (flank) or back pain present    Protocols used: URINE - BLOOD IN-A-

## 2021-06-16 LAB
BACTERIA SPEC CULT: ABNORMAL
Lab: ABNORMAL
SPECIMEN SOURCE: ABNORMAL

## 2021-06-23 DIAGNOSIS — N30.00 ACUTE CYSTITIS WITHOUT HEMATURIA: Primary | ICD-10-CM

## 2021-06-23 DIAGNOSIS — R30.0 DYSURIA: ICD-10-CM

## 2021-06-23 LAB
ALBUMIN UR-MCNC: NEGATIVE MG/DL
APPEARANCE UR: CLEAR
BACTERIA #/AREA URNS HPF: ABNORMAL /HPF
BILIRUB UR QL STRIP: NEGATIVE
COLOR UR AUTO: YELLOW
GLUCOSE UR STRIP-MCNC: NEGATIVE MG/DL
HGB UR QL STRIP: NEGATIVE
KETONES UR STRIP-MCNC: NEGATIVE MG/DL
LEUKOCYTE ESTERASE UR QL STRIP: ABNORMAL
NITRATE UR QL: NEGATIVE
NON-SQ EPI CELLS #/AREA URNS LPF: ABNORMAL /LPF
PH UR STRIP: 7.5 PH (ref 5–7)
RBC #/AREA URNS AUTO: ABNORMAL /HPF
SOURCE: ABNORMAL
SP GR UR STRIP: 1.02 (ref 1–1.03)
SPECIMEN SOURCE: NORMAL
UROBILINOGEN UR STRIP-ACNC: 0.2 EU/DL (ref 0.2–1)
WBC #/AREA URNS AUTO: ABNORMAL /HPF
WET PREP SPEC: NORMAL

## 2021-06-23 PROCEDURE — 81001 URINALYSIS AUTO W/SCOPE: CPT | Performed by: FAMILY MEDICINE

## 2021-06-23 PROCEDURE — 87086 URINE CULTURE/COLONY COUNT: CPT | Performed by: FAMILY MEDICINE

## 2021-06-23 PROCEDURE — 87210 SMEAR WET MOUNT SALINE/INK: CPT | Performed by: FAMILY MEDICINE

## 2021-06-23 RX ORDER — SULFAMETHOXAZOLE/TRIMETHOPRIM 800-160 MG
1 TABLET ORAL 2 TIMES DAILY
Qty: 6 TABLET | Refills: 0 | Status: SHIPPED | OUTPATIENT
Start: 2021-06-23 | End: 2021-06-26

## 2021-06-24 LAB
BACTERIA SPEC CULT: NORMAL
Lab: NORMAL
SPECIMEN SOURCE: NORMAL

## 2021-10-23 ENCOUNTER — HEALTH MAINTENANCE LETTER (OUTPATIENT)
Age: 56
End: 2021-10-23

## 2022-01-07 ENCOUNTER — E-VISIT (OUTPATIENT)
Dept: FAMILY MEDICINE | Facility: CLINIC | Age: 57
End: 2022-01-07
Payer: COMMERCIAL

## 2022-01-07 DIAGNOSIS — M25.551 HIP PAIN, RIGHT: Primary | ICD-10-CM

## 2022-01-07 DIAGNOSIS — Z87.768 HISTORY OF CONGENITAL DYSPLASIA OF HIP: ICD-10-CM

## 2022-01-07 DIAGNOSIS — M16.11 PRIMARY OSTEOARTHRITIS OF RIGHT HIP: ICD-10-CM

## 2022-01-07 PROCEDURE — 99421 OL DIG E/M SVC 5-10 MIN: CPT | Performed by: FAMILY MEDICINE

## 2022-01-11 NOTE — PATIENT INSTRUCTIONS
Hi, I'm happy to put in the referrals for you! I certainly hope this doesn't mean another hip replacement for you, but of course with your history this is a reasonable concern.    Please call [facility] at [phone] to schedule your hip MRI. You can usually schedule this within the week.    Locations:   [facility], [address] [place] [address]  or  [place] [address]    [facility]  will call you soon to help you set up an appointment with Dr. Garcia. I don't know what the wait is like for an appointment with him!    Please let me know if there's anything else I can do!    Sincerely,  Dr. Jessica Hogue MD  1/11/2022

## 2022-01-11 NOTE — TELEPHONE ENCOUNTER
Provider E-Visit time total (minutes): 8 minutes    ASSESSMENT / PLAN:  (M28.747) Hip pain, right  (primary encounter diagnosis)  (M16.11) Primary osteoarthritis of right hip  C(Z87.76) History of congenital dysplasia of hip  Comment:   Hi, I'm happy to put in the referrals for you! I certainly hope this doesn't mean another hip replacement for you, but of course with your history this is a reasonable concern.    Please call [facility] at [phone] to schedule your hip MRI. You can usually schedule this within the week.    Locations:   [facility], [address] [place] [address]  or  [place] [address]    [facility]  will call you soon to help you set up an appointment with Dr. Garcia. I don't know what the wait is like for an appointment with him!    Please let me know if there's anything else I can do!    Sincerely,  Dr. Jessica Hogue MD  1/11/2022      Plan: Orthopedic  Referral,   MR Hip Right w/o        Contrast

## 2022-01-13 DIAGNOSIS — M16.9 OA (OSTEOARTHRITIS) OF HIP: Primary | ICD-10-CM

## 2022-01-13 NOTE — TELEPHONE ENCOUNTER
RECORDS RECEIVED FROM: Right hip pain/OA/Hx of congenital dysplasia of hip/MRI 1/18/22/Jessica Hogue MD/[facility]   DATE RECEIVED: Jan 27, 2022     NOTES STATUS DETAILS   OFFICE NOTE from referring provider Internal  Jessica Hogue MD     OFFICE NOTE from other specialist N/A    DISCHARGE SUMMARY from hospital N/A    DISCHARGE REPORT from the ER N/A    OPERATIVE REPORT Internal 2/16/15   MEDICATION LIST Internal    IMPLANT RECORD/STICKER Internal    LABS     CBC/DIFF N/A    CULTURES N/A    INJECTIONS DONE IN RADIOLOGY N/A    MRI Internal 1/18/22 SCHEDULED   CT SCAN N/A    XRAYS (IMAGES & REPORTS) Internal 11/21/11

## 2022-01-18 ENCOUNTER — ANCILLARY PROCEDURE (OUTPATIENT)
Dept: MRI IMAGING | Facility: CLINIC | Age: 57
End: 2022-01-18
Attending: FAMILY MEDICINE
Payer: COMMERCIAL

## 2022-01-18 DIAGNOSIS — M16.11 PRIMARY OSTEOARTHRITIS OF RIGHT HIP: ICD-10-CM

## 2022-01-18 DIAGNOSIS — Z87.768 HISTORY OF CONGENITAL DYSPLASIA OF HIP: ICD-10-CM

## 2022-01-18 DIAGNOSIS — M25.551 HIP PAIN, RIGHT: ICD-10-CM

## 2022-01-18 PROCEDURE — 73721 MRI JNT OF LWR EXTRE W/O DYE: CPT | Mod: RT | Performed by: RADIOLOGY

## 2022-01-18 NOTE — RESULT ENCOUNTER NOTE
Buzz Finch!  You were right, you do seem to be having hip pain as a result of congenital hip dysplasia. The fibrous portion of your hip that helps keep the hip socket in position, called the labrum, is torn, and it looks like you have some swelling around some of the hip tendons. I do recommend seen the orthopedic specialist for further evaluation and recommendations, which it looks like you've already scheduled.    Good luck and let me know how I can help!    Sincerely,  Dr. Jessica Hogue MD  1/18/2022

## 2022-01-24 ASSESSMENT — HOOS JR
WALKING ON UNEVEN SURFACE: MODERATE
HOOS JR TOTAL INTERVAL SCORE: 76.78
GOING UP OR DOWN STAIRS: MODERATE

## 2022-01-27 ENCOUNTER — ANCILLARY PROCEDURE (OUTPATIENT)
Dept: GENERAL RADIOLOGY | Facility: CLINIC | Age: 57
End: 2022-01-27
Attending: ORTHOPAEDIC SURGERY
Payer: COMMERCIAL

## 2022-01-27 ENCOUNTER — OFFICE VISIT (OUTPATIENT)
Dept: ORTHOPEDICS | Facility: CLINIC | Age: 57
End: 2022-01-27
Attending: FAMILY MEDICINE
Payer: COMMERCIAL

## 2022-01-27 ENCOUNTER — PRE VISIT (OUTPATIENT)
Dept: ORTHOPEDICS | Facility: CLINIC | Age: 57
End: 2022-01-27

## 2022-01-27 VITALS — HEIGHT: 63 IN | WEIGHT: 131.4 LBS | BODY MASS INDEX: 23.28 KG/M2

## 2022-01-27 DIAGNOSIS — M16.31 OSTEOARTHRITIS OF RIGHT HIP JOINT DUE TO DYSPLASIA: Primary | ICD-10-CM

## 2022-01-27 DIAGNOSIS — Z87.768 HISTORY OF CONGENITAL DYSPLASIA OF HIP: ICD-10-CM

## 2022-01-27 DIAGNOSIS — M16.11 PRIMARY OSTEOARTHRITIS OF RIGHT HIP: ICD-10-CM

## 2022-01-27 DIAGNOSIS — M16.9 OA (OSTEOARTHRITIS) OF HIP: ICD-10-CM

## 2022-01-27 DIAGNOSIS — M25.551 HIP PAIN, RIGHT: ICD-10-CM

## 2022-01-27 PROCEDURE — 73502 X-RAY EXAM HIP UNI 2-3 VIEWS: CPT | Mod: RT | Performed by: RADIOLOGY

## 2022-01-27 PROCEDURE — 99204 OFFICE O/P NEW MOD 45 MIN: CPT | Performed by: ORTHOPAEDIC SURGERY

## 2022-01-27 ASSESSMENT — MIFFLIN-ST. JEOR: SCORE: 1147.22

## 2022-01-27 NOTE — PROGRESS NOTES
Assessment: This is a 57 year old with acetabular dysplasia and moderate OA associated with increasingly problematic symptoms for the past year but really interfering with things for the last 3-4 months. We discussed options in cluding injection and total hip. We spent twenty minutes discussing total hip arthroplasty.  We discussed the implants, the procedure, the risks and benefits, and the post-operative course.  We discussed blood clots, blood clots to the lungs, injury to blood vessels and nerves, dislocation, infection, and leg length difference.  Discussed dize of femoral head component and dislocation risk affect by size of socket and the pluses and minuses of reaming up for a large component versus bone stock retention. All the patients questions were answered to the best of my ability.    Plan:  Order for injection is in. Teaching for total hip complete. Patient is going to think about her options. Appropriate for either.     Chief Complaint: Consult (Left ANDREW Dr. Oviedo 2015. Right hip pain now. No treatment. 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)      Physician:  Jessica Hogue    HPI: Josette Gregg is a 57 year old female who presents today for evaluation of    Symptom Profile  Location of symptoms:  Groin, lateral hip   Onset: insidious  Trend: getting worse   Duration of symptoms:3-4 months   Quality of symptoms: aching, sharp/stabbing  Severity: moderate to at times severe   Alleviate:activity modification   Exacerbating: walking, stairs   Previous Treatments: Previous treatments include activity modification, oral pain medication (OTC)    LYNDSAY Jr: 76.78  Stationary bike for exercise    MEDICAL HISTORY:   Past Medical History:   Diagnosis Date     Congenital hip dysplasia     breech, treated     Left shoulder pain 7/16/2012   1 st born, breech      Medications:     Current Outpatient Medications:      Multiple Vitamin (MULTIVITAMINS PO), Take 1 tablet by mouth daily, Disp: , Rfl:      Allergies: Patient has no known allergies.    SURGICAL HISTORY:   Past Surgical History:   Procedure Laterality Date     ARTHROPLASTY MINIMALLY INVASIVE HIP Left 2/16/2015    Procedure: ARTHROPLASTY MINIMALLY INVASIVE HIP;  Surgeon: Edson Oviedo MD;  Location: [facility]     ARTHROSCOPY SHOULDER, OPEN BICEP TENODESIS REPAIR, COMBINED  4/9/2014    Procedure: COMBINED ARTHROSCOPY SHOULDER, OPEN BICEP TENODESIS REPAIR;;  Surgeon: Josh Love MD;  [facility]     ARTHROSCOPY SHOULDER, OPEN ROTATOR CUFF REPAIR, COMBINED  4/9/2014    Procedure: COMBINED ARTHROSCOPY SHOULDER, OPEN ROTATOR CUFF REPAIR;  Diagnostic Left SHOULDER ARTHROSCOPY,  OPEN ROTATOR CUFF REPAIR, Biceps Tenodesis;  Surgeon: Josh Love MD;  Location: [facility]     GYN SURGERY       HYSTERECTOMY  1/2008     ORTHOPEDIC SURGERY       US JOINT INJECTION ASPIRATION MAJOR RIGHT  11/3/2010    left shoulder injection [place] see scan       FAMILY HISTORY:   Family History   Problem Relation Age of Onset     Rheumatoid Arthritis Mother      Melanoma No family hx of      Skin Cancer No family hx of        SOCIAL HISTORY:   Social History     Tobacco Use     Smoking status: Never Smoker     Smokeless tobacco: Never Used   Substance Use Topics     Alcohol use: Yes     Comment: occasional   works, [employer] OR    REVIEW OF SYSTEMS:  The comprehensive review of systems from the intake form was reviewed with the patient.  No fever, weight change or fatigue. No dry eyes. No oral ulcers, sore throat or voice change. No palpitations, syncope, angina or edema.  No chest pain, excessive sleepiness, shortness of breath or hemoptysis.   No abdominal pain, nausea, vomiting, diarrhea or heartburn.  No skin rash. No focal weakness or numbness. No bleeding or lymphadenopathy. No rhinitis or hives.     Exam:  On physical examination the patient appears the stated age, is in no acute distress, affectThe is appropriate, and breathing is  "non-labored.  Vitals are documented in the EMR and have been reviewed:    Ht 1.588 m (5' 2.5\")   Wt 59.6 kg (131 lb 6.4 oz)   BMI 23.65 kg/m    5' 2.5\"  Body mass index is 23.65 kg/m .    Rises from chair: easily   Gait: normal   Trendelenburg test:  Gains the exam table: easily     RIGHT hip subjective: a little irritated   Abd: 25  Add:10  PFC:  Flexion: 90  IRF:10  ERF:20  Impingement test:++ groin    LEFT hip subjective: not irritated   Abd:  Add:  PFC:  Flexion: 90  IRF:  ERF:  Impingement test:    Distally, the circulatory, motor, and sensation exam is intact with 5/5 EHL, gastroc-soleus, and tibialis anterior.  Sensation to light touch is intact.  Dorsalis pedis and posterior tibialis pulses are palpable.  There are no sores on the feet, no bruising, and no lymphedema.    X-rays:   Right hip with classic type acetabular dysplasia associated with a [place] 2 joint      "

## 2022-01-27 NOTE — LETTER
1/27/2022         RE: Josette Gregg  [address]  [facility] [address]        Dear Colleague,    Thank you for referring your patient, Josette Gregg, to the [facility] [place]. Please see a copy of my visit note below.    Assessment: This is a 57 year old with acetabular dysplasia and moderate OA associated with increasingly problematic symptoms for the past year but really interfering with things for the last 3-4 months. We discussed options in cluding injection and total hip. We spent twenty minutes discussing total hip arthroplasty.  We discussed the implants, the procedure, the risks and benefits, and the post-operative course.  We discussed blood clots, blood clots to the lungs, injury to blood vessels and nerves, dislocation, infection, and leg length difference.  Discussed dize of femoral head component and dislocation risk affect by size of socket and the pluses and minuses of reaming up for a large component versus bone stock retention. All the patients questions were answered to the best of my ability.    Plan:  Order for injection is in. Teaching for total hip complete. Patient is going to think about her options. Appropriate for either.     Chief Complaint: Consult (Left ANDREW Dr. Oviedo 2015. Right hip pain now. No treatment. 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)      Physician:  Jessica Hogue    HPI: Josette Gregg is a 57 year old female who presents today for evaluation of    Symptom Profile  Location of symptoms:  Groin, lateral hip   Onset: insidious  Trend: getting worse   Duration of symptoms:3-4 months   Quality of symptoms: aching, sharp/stabbing  Severity: moderate to at times severe   Alleviate:activity modification   Exacerbating: walking, stairs   Previous Treatments: Previous treatments include activity modification, oral pain medication (OTC)    LYNDSAY Velez: 76.78  Stationary bike for exercise    MEDICAL HISTORY:   Past  Medical History:   Diagnosis Date     Congenital hip dysplasia     breech, treated     Left shoulder pain 7/16/2012   1 st born, breech      Medications:     Current Outpatient Medications:      Multiple Vitamin (MULTIVITAMINS PO), Take 1 tablet by mouth daily, Disp: , Rfl:     Allergies: Patient has no known allergies.    SURGICAL HISTORY:   Past Surgical History:   Procedure Laterality Date     ARTHROPLASTY MINIMALLY INVASIVE HIP Left 2/16/2015    Procedure: ARTHROPLASTY MINIMALLY INVASIVE HIP;  Surgeon: Edson Oviedo MD;  Location: [facility] OR     ARTHROSCOPY SHOULDER, OPEN BICEP TENODESIS REPAIR, COMBINED  4/9/2014    Procedure: COMBINED ARTHROSCOPY SHOULDER, OPEN BICEP TENODESIS REPAIR;;  Surgeon: Josh Love MD;  Location: [facility]     ARTHROSCOPY SHOULDER, OPEN ROTATOR CUFF REPAIR, COMBINED  4/9/2014    Procedure: COMBINED ARTHROSCOPY SHOULDER, OPEN ROTATOR CUFF REPAIR;  Diagnostic Left SHOULDER ARTHROSCOPY,  OPEN ROTATOR CUFF REPAIR, Biceps Tenodesis;  Surgeon: Josh Love MD;  Location: [facility]     GYN SURGERY       HYSTERECTOMY  1/2008     ORTHOPEDIC SURGERY       US JOINT INJECTION ASPIRATION MAJOR RIGHT  11/3/2010    left shoulder injection Jagdish see scan       FAMILY HISTORY:   Family History   Problem Relation Age of Onset     Rheumatoid Arthritis Mother      Melanoma No family hx of      Skin Cancer No family hx of        SOCIAL HISTORY:   Social History     Tobacco Use     Smoking status: Never Smoker     Smokeless tobacco: Never Used   Substance Use Topics     Alcohol use: Yes     Comment: occasional   works, control desk [employer]    REVIEW OF SYSTEMS:  The comprehensive review of systems from the intake form was reviewed with the patient.  No fever, weight change or fatigue. No dry eyes. No oral ulcers, sore throat or voice change. No palpitations, syncope, angina or edema.  No chest pain, excessive sleepiness, shortness of breath or hemoptysis.   No abdominal pain, nausea,  "vomiting, diarrhea or heartburn.  No skin rash. No focal weakness or numbness. No bleeding or lymphadenopathy. No rhinitis or hives.     Exam:  On physical examination the patient appears the stated age, is in no acute distress, affectThe is appropriate, and breathing is non-labored.  Vitals are documented in the EMR and have been reviewed:    Ht 1.588 m (5' 2.5\")   Wt 59.6 kg (131 lb 6.4 oz)   BMI 23.65 kg/m    5' 2.5\"  Body mass index is 23.65 kg/m .    Rises from chair: easily   Gait: normal   Trendelenburg test:  Gains the exam table: easily     RIGHT hip subjective: a little irritated   Abd: 25  Add:10  PFC:  Flexion: 90  IRF:10  ERF:20  Impingement test:++ groin    LEFT hip subjective: not irritated   Abd:  Add:  PFC:  Flexion: 90  IRF:  ERF:  Impingement test:    Distally, the circulatory, motor, and sensation exam is intact with 5/5 EHL, gastroc-soleus, and tibialis anterior.  Sensation to light touch is intact.  Dorsalis pedis and posterior tibialis pulses are palpable.  There are no sores on the feet, no bruising, and no lymphedema.    X-rays:   Right hip with classic type acetabular dysplasia associated with a Tonnis 2 joint      Jimbo Marcano MD    "

## 2022-01-27 NOTE — NURSING NOTE
Teaching Flowsheet   Relevant Diagnosis: Right hip osteoarthritis  Teaching Topic: Right total hip arthroplasty     Patient is going to have an injection in her right hip first and try to put off joint replacement as long as she can; this is her first injection in that joint.     Patient provided with packet. She will read through this and when she decides to proceed with surgery; she will discuss with this writer or another RN to go over her questions. Patient did have her left hip replaced 6 years ago.     Person(s) involved in teaching:   Patient     Instructional Materials Used/Given: Preoperative teaching packet, surgical soap x2, dental card, total joint booklet, get well loop flyer, welcome letter, stoplight tool.

## 2022-01-27 NOTE — NURSING NOTE
"Reason For Visit:   Chief Complaint   Patient presents with     Consult     Left ANDREW Dr. Oviedo 2015. Right hip pain now. No treatment. 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

## 2022-01-28 ENCOUNTER — TELEPHONE (OUTPATIENT)
Dept: ORTHOPEDICS | Facility: CLINIC | Age: 57
End: 2022-01-28
Payer: COMMERCIAL

## 2022-01-28 NOTE — TELEPHONE ENCOUNTER
Called and left voicemail for patient about scheduling surgery with Dr. Marcano. Gave [phone] as call back number.

## 2022-02-12 ENCOUNTER — HEALTH MAINTENANCE LETTER (OUTPATIENT)
Age: 57
End: 2022-02-12

## 2022-02-22 ENCOUNTER — ANCILLARY PROCEDURE (OUTPATIENT)
Dept: GENERAL RADIOLOGY | Facility: CLINIC | Age: 57
End: 2022-02-22
Attending: ORTHOPAEDIC SURGERY
Payer: COMMERCIAL

## 2022-02-22 DIAGNOSIS — M16.11 PRIMARY OSTEOARTHRITIS OF RIGHT HIP: ICD-10-CM

## 2022-02-22 PROCEDURE — 77002 NEEDLE LOCALIZATION BY XRAY: CPT | Mod: GC | Performed by: RADIOLOGY

## 2022-02-22 PROCEDURE — 20610 DRAIN/INJ JOINT/BURSA W/O US: CPT | Mod: RT | Performed by: RADIOLOGY

## 2022-02-22 RX ORDER — IOPAMIDOL 408 MG/ML
10 INJECTION, SOLUTION INTRATHECAL ONCE
Status: COMPLETED | OUTPATIENT
Start: 2022-02-22 | End: 2022-02-22

## 2022-02-22 RX ORDER — LIDOCAINE HYDROCHLORIDE 10 MG/ML
30 INJECTION, SOLUTION EPIDURAL; INFILTRATION; INTRACAUDAL; PERINEURAL ONCE
Status: COMPLETED | OUTPATIENT
Start: 2022-02-22 | End: 2022-02-22

## 2022-02-22 RX ORDER — BUPIVACAINE HYDROCHLORIDE 2.5 MG/ML
10 INJECTION, SOLUTION EPIDURAL; INFILTRATION; INTRACAUDAL ONCE
Status: COMPLETED | OUTPATIENT
Start: 2022-02-22 | End: 2022-02-22

## 2022-02-22 RX ORDER — TRIAMCINOLONE ACETONIDE 40 MG/ML
40 INJECTION, SUSPENSION INTRA-ARTICULAR; INTRAMUSCULAR ONCE
Status: COMPLETED | OUTPATIENT
Start: 2022-02-22 | End: 2022-02-22

## 2022-02-22 RX ADMIN — IOPAMIDOL 4 ML: 408 INJECTION, SOLUTION INTRATHECAL at 11:08

## 2022-02-22 RX ADMIN — LIDOCAINE HYDROCHLORIDE 4 ML: 10 INJECTION, SOLUTION EPIDURAL; INFILTRATION; INTRACAUDAL; PERINEURAL at 11:09

## 2022-02-22 RX ADMIN — TRIAMCINOLONE ACETONIDE 40 MG: 40 INJECTION, SUSPENSION INTRA-ARTICULAR; INTRAMUSCULAR at 11:09

## 2022-02-22 RX ADMIN — BUPIVACAINE HYDROCHLORIDE 5 MG: 2.5 INJECTION, SOLUTION EPIDURAL; INFILTRATION; INTRACAUDAL at 11:09

## 2022-03-02 ENCOUNTER — OFFICE VISIT (OUTPATIENT)
Dept: DERMATOLOGY | Facility: CLINIC | Age: 57
End: 2022-03-02
Payer: COMMERCIAL

## 2022-03-02 DIAGNOSIS — L82.1 SEBORRHEIC KERATOSIS: ICD-10-CM

## 2022-03-02 DIAGNOSIS — Z12.83 SKIN CANCER SCREENING: Primary | ICD-10-CM

## 2022-03-02 DIAGNOSIS — D18.01 CHERRY ANGIOMA: ICD-10-CM

## 2022-03-02 DIAGNOSIS — D22.9 MULTIPLE BENIGN NEVI: ICD-10-CM

## 2022-03-02 DIAGNOSIS — L81.4 SOLAR LENTIGO: ICD-10-CM

## 2022-03-02 PROCEDURE — 99213 OFFICE O/P EST LOW 20 MIN: CPT | Performed by: PHYSICIAN ASSISTANT

## 2022-03-02 ASSESSMENT — PAIN SCALES - GENERAL: PAINLEVEL: NO PAIN (0)

## 2022-03-02 NOTE — PROGRESS NOTES
[facility] Dermatology Note  Encounter Date: Mar 2, 2022  Office Visit     Dermatology Problem List:  1. inflamed seborrheic keratosis, left upper chest s/p biopsy 11/30/17  2. Skin cancer screening 3/2/2022  ____________________________________________    Assessment & Plan:    # Cherry angiomas  # Seborrheic keratoses  Discussed the natural history and benign nature of this lesion. Reassurance provided that no additional treatment is necessary.      # Multiple benign nevi  # Solar lentigines  - No concerning lesions today  - Monitor for ABCDEs of melanoma   - Continue sun protection - recommend SPF 30 or higher with frequent application   - Return sooner if noticing changing or symptomatic lesions     Procedures Performed:   None    Follow-up: 2 year(s) in-person, or earlier for new or changing lesions    Staff and Scribe:     Scribe Disclosure:  I, Juanjo Bella, am serving as a scribe to document services personally performed by Mery Frank PA-C based on data collection and the provider's statements to me.     Provider Disclosure:   The documentation recorded by the scribe accurately reflects the services I personally performed and the decisions made by me.    All risks, benefits and alternatives were discussed with patient.  Patient is in agreement and understands the assessment and plan.  All questions were answered.  Sun Screen Education was given.   Return to Clinic in 2 years or sooner as needed.   Mery Frank PA-C   [facility]   ____________________________________________    CC: Skin Check ( pt states she is here for a full body skin check, spots on back.)    HPI:  Ms. Josette Gregg is a(n) 57 year old female who presents today as a return patient for FBSE. Last seen by me on 7/31/2019, at which time patient underwent cryo for treatment of inflamed seborrheic keratoses.    Today, patient does not report any new spots of concern on  her skin.    Patient is otherwise feeling well, without additional skin concerns.    Labs Reviewed:  N/A    Physical Exam:  Vitals: There were no vitals taken for this visit.  SKIN: Total skin excluding the undergarment areas was performed. The exam included the head/face, neck, both arms, chest, back, abdomen, both legs, digits and/or nails.   - There are dome shaped bright red papules on the trunk and extremities.   - Multiple regular brown pigmented macules and papules are identified on the trunk and extremities.   - Scattered brown macules on sun exposed areas.  - There are waxy stuck on tan to brown papules on the trunk and extremities.   - No other lesions of concern on areas examined.     Medications:  Current Outpatient Medications   Medication     Multiple Vitamin (MULTIVITAMINS PO)     No current facility-administered medications for this visit.      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

## 2022-03-02 NOTE — LETTER
3/2/2022       RE: Josette Gregg  [address]  [facility] [address]     Dear Colleague,    Thank you for referring your patient, Josette Gregg, to the [facility]MAT[facility] [place] [facility]. Please see a copy of my visit note below.    [facility] Dermatology Note  Encounter Date: Mar 2, 2022  Office Visit     Dermatology Problem List:  1. inflamed seborrheic keratosis, left upper chest s/p biopsy 11/30/17  2. Skin cancer screening 3/2/2022  ____________________________________________    Assessment & Plan:    # Cherry angiomas  # Seborrheic keratoses  Discussed the natural history and benign nature of this lesion. Reassurance provided that no additional treatment is necessary.      # Multiple benign nevi  # Solar lentigines  - No concerning lesions today  - Monitor for ABCDEs of melanoma   - Continue sun protection - recommend SPF 30 or higher with frequent application   - Return sooner if noticing changing or symptomatic lesions     Procedures Performed:   None    Follow-up: 2 year(s) in-person, or earlier for new or changing lesions    Staff and Scribe:     Scribe Disclosure:  I, Juanjo Bella, am serving as a scribe to document services personally performed by Mery Frank PA-C based on data collection and the provider's statements to me.     Provider Disclosure:   The documentation recorded by the scribe accurately reflects the services I personally performed and the decisions made by me.    All risks, benefits and alternatives were discussed with patient.  Patient is in agreement and understands the assessment and plan.  All questions were answered.  Sun Screen Education was given.   Return to Clinic in 2 years or sooner as needed.   Mery Frank PA-C   [facility] Dermatology Clinic   ____________________________________________    CC: Skin Check ( pt states she is here for a  full body skin check, spots on back.)    HPI:  Ms. Josette Gregg is a(n) 57 year old female who presents today as a return patient for FBSE. Last seen by me on 7/31/2019, at which time patient underwent cryo for treatment of inflamed seborrheic keratoses.    Today, patient does not report any new spots of concern on her skin.    Patient is otherwise feeling well, without additional skin concerns.    Labs Reviewed:  N/A    Physical Exam:  Vitals: There were no vitals taken for this visit.  SKIN: Total skin excluding the undergarment areas was performed. The exam included the head/face, neck, both arms, chest, back, abdomen, both legs, digits and/or nails.   - There are dome shaped bright red papules on the trunk and extremities.   - Multiple regular brown pigmented macules and papules are identified on the trunk and extremities.   - Scattered brown macules on sun exposed areas.  - There are waxy stuck on tan to brown papules on the trunk and extremities.   - No other lesions of concern on areas examined.     Medications:  Current Outpatient Medications   Medication     Multiple Vitamin (MULTIVITAMINS PO)     No current facility-administered medications for this visit.      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

## 2022-03-02 NOTE — PATIENT INSTRUCTIONS
Patient Education     Checking for Skin Cancer  You can find cancer early by checking your skin each month. There are 3 kinds of skin cancer. They are melanoma, basal cell carcinoma, and squamous cell carcinoma. Doing monthly skin checks is the best way to find new marks or skin changes. Follow the instructions below for checking your skin.   The ABCDEs of checking moles for melanoma   Check your moles or growths for signs of melanoma using ABCDE:     Asymmetry: the sides of the mole or growth don t match    Border: the edges are ragged, notched, or blurred    Color: the color within the mole or growth varies    Diameter: the mole or growth is larger than 6 mm (size of a pencil eraser)    Evolving: the size, shape, or color of the mole or growth is changing (evolving is not shown in the images below)    Checking for other types of skin cancer  Basal cell carcinoma or squamous cell carcinoma have symptoms such as:       A spot or mole that looks different from all other marks on your skin    Changes in how an area feels, such as itching, tenderness, or pain    Changes in the skin's surface, such as oozing, bleeding, or scaliness    A sore that does not heal    New swelling or redness beyond the border of a mole    Who s at risk?  Anyone can get skin cancer. But you are at greater risk if you have:     Fair skin, light-colored hair, or light-colored eyes    Many moles or abnormal moles on your skin    A history of sunburns from sunlight or tanning beds    A family history of skin cancer    A history of exposure to radiation or chemicals    A weakened immune system  If you have had skin cancer in the past, you are at risk for recurring skin cancer.   How to check your skin  Do your monthly skin checkups in front of a full-length mirror. Check all parts of your body, including your:     Head (ears, face, neck, and scalp)    Torso (front, back, and sides)    Arms (tops, undersides, upper, and lower armpits)    Hands  (palms, backs, and fingers, including under the nails)    Buttocks and genitals    Legs (front, back, and sides)    Feet (tops, soles, toes, including under the nails, and between toes)  If you have a lot of moles, take digital photos of them each month. Make sure to take photos both up close and from a distance. These can help you see if any moles change over time.   Most skin changes are not cancer. But if you see any changes in your skin, call your doctor right away. Only he or she can diagnose a problem. If you have skin cancer, seeing your doctor can be the first step toward getting the treatment that could save your life.   Thename.is last reviewed this educational content on 4/1/2019 2000-2020 [employer]. [address] [place] [address]. All rights reserved. This information is not intended as a substitute for professional medical care. Always follow your healthcare professional's instructions.       When should I call my doctor?    If you are worsening or not improving, please, contact us or seek urgent care as noted below.     Who should I call with questions (adults)?    [facility] (adult and pediatric): [phone]    [facility] (adult): [phone]    For urgent needs outside of business hours call the [facility] at [phone] and ask for the dermatology resident on call to be paged    If this is a medical emergency and you are unable to reach an ER, Call [phone]    Who should I call with questions (pediatric)?  [facility]- Pediatric Dermatology  Dr. Pepper Segura, Dr. Hunter Cochran, Dr. Anahi Francisco, ANGELICA Johnson, Dr. Pooja Carrizales, Dr. Phyllis Godoy & Dr. Lv Buchanan  Non-urgent nurse triage line; [phone]- Tanna and Yana VILLARREAL Care Coordinatorjan Gould (/Complex ) [phone]    If you need a prescription refill, please contact your  pharmacy. Refills are approved or denied by our Physicians during normal business hours, Monday through Fridays  Per office policy, refills will not be granted if you have not been seen within the past year (or sooner depending on your child's condition)    Scheduling Information:  Pediatric Appointment Scheduling and Call Center [phone]  Radiology Scheduling- [phone]  Sedation Unit Scheduling- [phone]  [place] Scheduling- [facility] [phone]; Pediatric Dermatology [phone]  Main  Services: [phone]  Pakistani: [phone]  Tuvaluan: [phone]  Hmong/Jamaican/Panda: [phone]  Preadmission Nursing Department Fax Number: [phone] (Fax all pre-operative paperwork to this number)    For urgent matters arising during evenings, weekends, or holidays that cannot wait for normal business hours please call [phone] and ask for the dermatology resident on call to be paged.

## 2022-03-14 ENCOUNTER — E-VISIT (OUTPATIENT)
Dept: FAMILY MEDICINE | Facility: CLINIC | Age: 57
End: 2022-03-14
Payer: COMMERCIAL

## 2022-03-14 DIAGNOSIS — J39.2 THROAT DISORDER: Primary | ICD-10-CM

## 2022-03-14 PROCEDURE — 99421 OL DIG E/M SVC 5-10 MIN: CPT | Performed by: FAMILY MEDICINE

## 2022-03-17 NOTE — TELEPHONE ENCOUNTER
FUTURE VISIT INFORMATION      FUTURE VISIT INFORMATION:    Date: 6/14/22    Time: 2PM    Location: [facility] [place]  REFERRAL INFORMATION:    Referring provider:  Jessica Hogue MD    Referring providers clinic:  [facility]    Reason for visit/diagnosis  Throat disorder, feels like something is in the back of the throat, does not want to see SLP per pt    RECORDS REQUESTED FROM:       Clinic name Comments Records Status Imaging Status   [facility] 3/14/22 referral Epic

## 2022-03-22 ENCOUNTER — CARE COORDINATION (OUTPATIENT)
Dept: OTOLARYNGOLOGY | Facility: CLINIC | Age: 57
End: 2022-03-22
Payer: COMMERCIAL

## 2022-03-22 NOTE — TELEPHONE ENCOUNTER
FUTURE VISIT INFORMATION      FUTURE VISIT INFORMATION:    Date: 3/25/22    Time: 10AM    Location: [facility] [place]  REFERRAL INFORMATION:    Referring provider:      Referring providers clinic:      Reason for visit/diagnosis  scheduled per miguel Villanueva by Dr. Ritchie    RECORDS REQUESTED FROM:       Clinic name Comments Records Status Imaging Status                                         *no recs

## 2022-03-23 ENCOUNTER — PATIENT OUTREACH (OUTPATIENT)
Dept: OTOLARYNGOLOGY | Facility: CLINIC | Age: 57
End: 2022-03-23

## 2022-03-23 NOTE — PROGRESS NOTES
Writer spoke with patient this morning. We cancelled her appointment with Dr. Ritchie for today at 0945. He did not think it would be beneficial for the patient and recommends to keep new appointment with Dr. Rueda for 3/29/22 at 1600.    Patient verbalized understanding.     Message sent to schedulers to assist with this.    Marilee Weeks RN on 3/23/2022 at 9:11 AM

## 2022-03-25 ENCOUNTER — PRE VISIT (OUTPATIENT)
Dept: OTOLARYNGOLOGY | Facility: CLINIC | Age: 57
End: 2022-03-25

## 2022-03-28 ENCOUNTER — TELEPHONE (OUTPATIENT)
Dept: OTOLARYNGOLOGY | Facility: CLINIC | Age: 57
End: 2022-03-28
Payer: COMMERCIAL

## 2022-03-28 NOTE — TELEPHONE ENCOUNTER
Writer attempted to contact patient to confirm appointment with Dr. Rueda 3/29/22 4:00 PM. Writer left a detailed voicemail and MyChart message for the Patient.    Leoncio Fregoso, EMT

## 2022-03-29 ENCOUNTER — OFFICE VISIT (OUTPATIENT)
Dept: OTOLARYNGOLOGY | Facility: CLINIC | Age: 57
End: 2022-03-29
Payer: COMMERCIAL

## 2022-03-29 ENCOUNTER — OFFICE VISIT (OUTPATIENT)
Dept: OTOLARYNGOLOGY | Facility: CLINIC | Age: 57
End: 2022-03-29
Attending: FAMILY MEDICINE
Payer: COMMERCIAL

## 2022-03-29 VITALS
OXYGEN SATURATION: 99 % | DIASTOLIC BLOOD PRESSURE: 73 MMHG | HEIGHT: 63 IN | BODY MASS INDEX: 22.68 KG/M2 | WEIGHT: 128 LBS | HEART RATE: 89 BPM | SYSTOLIC BLOOD PRESSURE: 133 MMHG

## 2022-03-29 DIAGNOSIS — R49.0 DYSPHONIA: Primary | ICD-10-CM

## 2022-03-29 DIAGNOSIS — R13.10 DYSPHAGIA: Primary | ICD-10-CM

## 2022-03-29 DIAGNOSIS — J39.2 THROAT DISORDER: ICD-10-CM

## 2022-03-29 DIAGNOSIS — R09.A2 GLOBUS SENSATION: ICD-10-CM

## 2022-03-29 DIAGNOSIS — J38.7 LARYNGEAL HYPERFUNCTION: ICD-10-CM

## 2022-03-29 DIAGNOSIS — R07.0 THROAT PAIN: ICD-10-CM

## 2022-03-29 DIAGNOSIS — J38.7 IRRITABLE LARYNX SYNDROME: ICD-10-CM

## 2022-03-29 PROCEDURE — 92507 TX SP LANG VOICE COMM INDIV: CPT | Mod: GN | Performed by: SPEECH-LANGUAGE PATHOLOGIST

## 2022-03-29 PROCEDURE — 99204 OFFICE O/P NEW MOD 45 MIN: CPT | Mod: 25 | Performed by: OTOLARYNGOLOGY

## 2022-03-29 PROCEDURE — 92524 BEHAVRAL QUALIT ANALYS VOICE: CPT | Mod: GN | Performed by: SPEECH-LANGUAGE PATHOLOGIST

## 2022-03-29 PROCEDURE — 31575 DIAGNOSTIC LARYNGOSCOPY: CPT | Performed by: OTOLARYNGOLOGY

## 2022-03-29 ASSESSMENT — PAIN SCALES - GENERAL: PAINLEVEL: NO PAIN (0)

## 2022-03-29 NOTE — LETTER
"3/29/2022       RE: Josette Gregg  [address]  [facility] [address]     Dear Colleague,    Thank you for referring your patient, Josette Gregg, to the [facility] [place] at [facility]. Please see a copy of my visit note below.    [facility]  Thee Yadav Jr., M.D., F.A.C.S.  Maggie Estrada M.D., M.P.H.  Daniela Rueda M.D.  Amanda Rico, Ph.D., CCC-SLP  Cm Kendrick, Ph.D., CCC-SLP  Halle Zamudio M.M. (voice), M.A., CCC-SLP  Jules Rivera M.M. (voice), M.A., CCC-SLP  REAL Gonsalves (voice), M.S., CCC-SLP  [facility]  CLINICAL EVALUATION REPORT    Patient: Josette Gregg  Date of Service: 3/29/2022  Clinician: Angeline Gonsalves, MS, CCC-SLP  Seen in conjunction with: Dr. Daniela Rueda  Referring physician: Jessica Hogue MD    HISTORY  PATIENT INFORMATION  Josette Gregg is a 57 year old female presenting today for evaluation of something stuck in her throat. Salient details of her symptom history are as follows:    Chief complaint: Josette noticed a slight sore throat about a month ago and the next day it was gone, but she's since felt like there was something stuck in her throat. She waited about 1.5 weeks to see if it would go away, then reached out for an ENT referral. It's more pronounced after she eats, though she doesn't have any issues with eating or swallowing. It's also more noticeable when she lays down. It never goes away and has not happened in the past. She points to her larynx as the location of the \"something stuck\" but also will sometimes feel pressure up toward her ears.     Onset: early March 2021     Course: Worsening    CURRENT SYMPTOMS INCLUDE:    VOICE: Her voice has started to get tired the past week. 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 Her voice is " "slightly deeper and rougher when it's tired.     COUGH/THROAT CLEAR: Denies    SWALLOWING: No trouble with eating, other than feeling like pills didn't go down all the way on Monday. She'll notice the \"something stuck\" feeling more after she eats. She doesn't cough or choke when eating.     BREATHING: Denies    OTHER PERTINENT HISTORY    Reflux: Denies overt symptoms. No sore throat in the morning or indigestion after eating.     Post-Nasal Drip/Congestion: Denies    Please also refer to Dr. Daniela Rueda's dictation.     Past Medical History:   Diagnosis Date     Congenital hip dysplasia     breech, treated     Left shoulder pain 7/16/2012     Past Surgical History:   Procedure Laterality Date     ARTHROPLASTY MINIMALLY INVASIVE HIP Left 2/16/2015    Procedure: ARTHROPLASTY MINIMALLY INVASIVE HIP;  Surgeon: Edson Oviedo MD;  Location:  OR     ARTHROSCOPY SHOULDER, OPEN BICEP TENODESIS REPAIR, COMBINED  4/9/2014    Procedure: COMBINED ARTHROSCOPY SHOULDER, OPEN BICEP TENODESIS REPAIR;;  Surgeon: Josh Love MD;  Location: [facility]     ARTHROSCOPY SHOULDER, OPEN ROTATOR CUFF REPAIR, COMBINED  4/9/2014    Procedure: COMBINED ARTHROSCOPY SHOULDER, OPEN ROTATOR CUFF REPAIR;  Diagnostic Left SHOULDER ARTHROSCOPY,  OPEN ROTATOR CUFF REPAIR, Biceps Tenodesis;  Surgeon: Josh Love MD;  Location: [facility]     GYN SURGERY       HYSTERECTOMY  1/2008     ORTHOPEDIC SURGERY       US JOINT INJECTION ASPIRATION MAJOR RIGHT  11/3/2010    left shoulder injection Jagdish see scan       OBJECTIVE FINDINGS  Patient Supplied Answers To VHI Questionnaire  Voice Handicap Index (VHI-10) 3/23/2022   My voice makes it difficult for people to hear me 0   People have difficulty understanding me in a noisy room 0   My voice difficulties restrict my personal and social life.  0   I feel left out of conversations because of my voice 0   My voice problem causes me to lose income 0   I feel as though I have to strain to produce " "voice 0   The clarity of my voice is unpredictable 0   My voice problem upsets me 0   My voice makes me feel handicapped 0   People ask, \"What's wrong with your voice?\" 0   VHI-10 0        Patient Supplied Answers To CSI Questionnaire  Cough Severity Index (CSI) 3/23/2022   My cough is worse when I lie down 0   My coughing problem causes me to restrict my personal and social life 0   I tend to avoid places because of my cough problem 0   I feel embarrassed because of my coughing problem 0   People ask, ''What's wrong?'' because I cough a lot 0   I run out of air when I cough 0   My coughing problem affects my voice 0   My coughing problem limits my physical activity 0   My coughing problem upsets me 0   People ask me if I am sick because I cough a lot 0   CSI Score 0        Patient Supplied Answers To EAT Questionnaire  Eating Assessment Tool (EAT-10) 3/23/2022   My swallowing problem has caused me to lose weight 0   My swallowing problem interferes with my ability to go out for meals 0   Swallowing liquids takes extra effort 0   Swallowing solids takes extra effort 0   Swallowing pills takes extra effort 0   Swallowing is painful 0   The pleasure of eating is affected by my swallowing 0   When I swallow food sticks in my throat 1   I cough when I eat 0   EAT-10 1        Patient Supplied Answers to Dyspnea Index Questionnaire:    No flowsheet data found.     PERCEPTUAL EVALUATION (75421)  VOICE/ SPEECH/ NON-COMMUNICATIVE LARYNGEAL BEHAVIORS EVALUATION    Palpation of the laryngeal area shows:    tenderness of the thyrohyoid area    reduced thyrohoid space    bilaterally    palpation induced cough    Breathing pattern:     shoulder and neck involvement and overall shallow breath pattern    Tension/Posture:    is not overtly evident    Cough/ Throat clear:    throat clear is primary, observed occasionally during today's session and locus of cough/throat clear sounds consistent with upper airway     Josette states " today is a typical voice day, with clinician observing voice quality characterized by:    Roughness: Mild    Breathiness: Mild    Strain: Mild    Habitual pitch is 212 Hz and is WNL    Pitch glide reveals frequency range of 155-460 Hz    Loudness is WNL and is appropriate for the setting    Maximum Phonation Time: 20 seconds    GLOBAL ASSESSMENT OF DYSPHONIA: 18/100    LARYNGEAL EXAMINATION  Procedure: Flexible endoscopy with chip-tip technology without stroboscopy, left nostril; topical anesthesia with 3% Lidocaine and 0.25% phenylephrine was applied.   Performed by: Dr. Daniela Rueda  The laryngeal and pharyngeal structures were evaluated for gross appearance, mobility, function, and focal lesions / abnormalities of the associated mucosa.    All findings were within normal limits with the exception of the following salient features:     Moderate 4-way supraglottic hyperfunction    Mild edema and erythema of posterior cricoid region/arytenoids    The laryngeal exam was reviewed with Josette, and I provided pertinent explanations, as well as written and oral information.    STIMULABILITY: results of therapy probes during perceptual and laryngeal evaluation demonstrate improvement with coordination of respiration and phonation and use of yawn sigh    ASSESSMENT / PLAN  IMPRESSIONS: Josette Gregg is presenting today with Globus Sensation (R09.89), Throat Pain (R07.0) and Dysphonia (R49.0) in the context of Irritable Larynx Syndrome (ILS) (J38.7) and Laryngeal Hyperfunction (J38.7). Laryngoscopy revealed moderate supraglottic hyperfunction and mild edema and erythema of the posterior cricoid region/arytenoids suggestive of LPR. She perceptually demonstrated mildly rough, strained, and breathy voice quality with non-optimal respiratory mechanics, and tenderness of the bilateral thyrohyoid space. She would benefit from a course of speech therapy targeting reduced laryngeal irritation (LPR), improved respiratory  mechanics, decreased perilaryngeal hyperfunction, and phonatory techniques minimizing hyperfunction.     A course of speech therapy is recommended to improve voice quality and promote reduced discomfort, effort and fatigue.    She demonstrates a Good prognosis for improvement given adherence to therapeutic recommendations.     Positive indicators: positive response to therapy probes high level of comittment    Negative indicators: None    Research: n/a     DURATION / FREQUENCY: 4 bi-weekly therapy sessions    Goals:  Patient goal:    To understand the problem and fix it as much as possible     Short-term goal(s): Within the first 4 sessions, Josette will:  -- utilize vocal hygiene strategies in order to minimize laryngeal irritation and facilitate improved therapeutic outcomes with 90% accy.  -- demonstrate silent inhalation and abdominal breathing pattern in order to optimize breathing mechanics with 90% accy and min cues.  -- demonstrate lora-laryngeal release and laryngeal massage techniques with >80% accy  -- coordinate appropriate air flow levels with forward resonance during phonation in order to minimize laryngeal compensation and effort with 90% accy.    Long-term goal(s): In 3 months, Josette will:  -- report a 90% resolution of vioce and throat symptoms during a week of performing typical personal, social, and professional activities.    This treatment plan was developed with the patient who agreed with the recommendations.  _______________________________________________________________________  THERAPY NOTE (CPT 29849)  Date of Service: 3/29/2022    SUBJECTIVE / OBJECTIVE:  Please refer to my evaluation report from today's encounter for full details regarding subjective data, patient reported measures, and diagnostic findings.    THERAPEUTIC ACTIVITIES  Today Josette participated in the following therapeutic activities:  Counseling and Education:    Asked questions about the nature of her symptoms,  and I answered all of these thoroughly.    Therapy protocol and rationale, including plan for today's session and future sessions    Education of laryngeal anatomy and physiology    Information regarding the complex interactions between laryngeal function and self-perpetuating cycles of irritation and hyper-reactivity with application to her own reported symptoms and patterns of laryngeal impairment    Concepts and strategies for managing laryngopharyngeal reflux disorder, to reduce laryngeal inflammation. This included education regarding the impact of reflux on the laryngeal system and management tasks including diet modification, head of bed elevation, avoidance of oral intake prior to laying down, and stress management.    Exercises to coordinate phonation with optimal flowing airstream and reduce phonatory impact.     Semi-occluded vocal tract exercises with a straw and streamer were most facilitating    She progressed through the warm-up level of phonatory complexity    Instructed to use as a voice warm up, cool down, coordination of breath flow with phonation, and for tissue mobilization.    Specific modifications instructed included labial rounding    Patient demonstrated good learning, but will need practice and additional reinforcement    Instruction of Home Practice:    I instructed Josette in the concepts of an optimal practice regimen, including use of an interval schedule with brief periods of practice frequently throughout each day, and concepts of volitional practice to facilitate motor learning.    I emphasized the therapeutic rational and provided an After Visit Summary through [facility] to reinforce today's therapeutic activities and facilitate home practice.    PLAN: I will see Josette in 1 weeks, at which time we will target respiratory mechanics with application to streamer tasks and release of perilaryngeal hyperfunction.       TOTAL SERVICE TIME: 60 minutes  EVALUATION OF VOICE AND RESONANCE  (65877)  TREATMENT (42535)  NO CHARGE FACILITY FEE (57556)    Emre Gonsalves (voice), M.S., CCC-SLP  Speech-Language Pathologist  [facility]  [phone]  [email or web address]  Pronouns: she/her/hers      *this report was created in part through the use of computerized dictation software, and though reviewed following completion, some typographic errors may persist.  If there is confusion regarding any of this notes contents, please contact me for clarification      Again, thank you for allowing me to participate in the care of your patient.      Sincerely,    Herminia Shaw, SLP

## 2022-03-29 NOTE — PATIENT INSTRUCTIONS
1.  You were seen in the ENT Clinic today by . If you have any questions or concerns after your appointment, please call [phone]. Press option #1 for scheduling related needs. Press option #3 for Nurse advice.    2.   has recommended  the following:   - voice therapy     - reflux precautions   - Xray video swallow exam and esophagram   - referral to GI if symptoms do not improve after therapy       3.  Plan is to return to clinic as needed after therapy    Priyanka White LPN  [phone]  [facility]

## 2022-03-29 NOTE — PATIENT INSTRUCTIONS
Hello!    It was a pleasure to see you today. Please complete the exercises below and remember, a few minutes of practice many times throughout the day is more important than one large practice session. If you have any questions about your strategies, feel free to contact me at [email or web address] or via [facility]. Please call [phone] for scheduling alterations.     Your throat has some slightly swollen tissue and the voice muscles are working extra hard. You need your voice heavily to do your job, which combined with working harder than you need to, is like running a marathon while stomping your feet with every step. It can really irritate and fatigue the system.     Speech therapy will work on reducing potential irritants like silent reflux and muscle tension, then teach you ways to reduce the extra work when you talk.     We will plan to see each other for 3 more virtual therapy sessions. Please remember to logon to [facility] a few minutes early to complete the questionnaires before your visit starts.     Home Exercise Program:  REFLUX MANAGEMENT (ongoing as needed)  -- When reflux comes up the wrong way from your stomach and spills into your throat, you may not feel it come up if the acidity levels are low. However, liquid spilling onto the vocal cords can cause lots of throat irritation, cough and throat clearing, hoarseness, and even difficulty breathing.   1. Elevate the head of your bed about 4 inches, trying to keep the mattress straight to avoid any back or neck aches.   2. Avoid eating/drinking close to when you lay down. The less there is in your stomach, the less can spill back up during the night.   3. Try to eat foods with a high PH level (less acidic). Possible reflux triggers include spicy foods, acidic foods, and foods that may relax the esophageal sphincters and allow back-splash, such as raw onions, garlic, or peppers, store-bought tomato sauces/salsas, pizza, greasy foods, orange or  "lemon juice, chocolate, caffeine, carbonation, alcohol, mints and menthol. Try to avoid these foods for about a month so your throat has time to heal and calm down, then reintroduce 1 food every 3-4 days, using a notebook to track any increase in symptoms to figure out which foods are your trigger foods.   4. Avoid putting pressure on your stomach by slouching, wearing tight clothes, or carrying extra weight that pushes on your stomach.   5. Actively reduce stress. Stress can cause a fight or flight response in your body, even if you feel like you're able to handle stress. Try deep breathing, yoga, or mindfulness meditation to help calm your nervous system (Calm, Headspace, Yoga with Kandace on youtube, etc). Square Breathing is an easy to use stress management tool: Inhale for 4 seconds, hold your breath for 4 seconds, exhale for 4 seconds, and hold your breath for 4 seconds. Repeat at least 4 times.      VOICE EXERCISES (AM & PM, more if needed)  -- Inhale SILENTLY and feel your belly fill with air  -- Slowly exhale as your belly contracts, ROUND \"OO\" LIPS  -- Hold your streamer about 6 inches from the end of your straw  -- Think of saying \"Whoooooo\" and watch for a smooth, gentle streamer movement, like a flag blowing in a breeze  1. 3-5x silently with just air for 5-7 seconds  2. 3-5x short and easy sighs on \"Whooo\"  3. 3-5x foghorn on single pitch for 5-7 seconds  4. 3-5x sliding lower for 5-7 seconds (\"ding dong\")  5. 3-5x sliding higher for 5-7 seconds (\"here comes the bride\")  6. 3-5x sliding up and down like sirens  7. Bonus: Easy songs/melodies with slurred phrases and no stopping streamer  -- Double-check that your inhale is a SILENT yawn and your LIPS are ROUNDED    Thank you and have a great day!  Herminia"

## 2022-03-29 NOTE — NURSING NOTE
"Chief Complaint   Patient presents with     Consult     New patient       Blood pressure 133/73, pulse 89, height 1.588 m (5' 2.5\"), weight 58.1 kg (128 lb), SpO2 99 %, not currently breastfeeding.    Leoncio Fregoso, EMT  "

## 2022-03-29 NOTE — PROGRESS NOTES
"[facility] VOICE [facility]  Thee Yadav Jr., M.D., F.A.C.S.  Maggie Estrada M.D., M.P.H.  Daniela Rueda M.D.  Amanda Rico, Ph.D., CCC-SLP  Cm Kendrick, Ph.D., [facility]-SLP  Halle Zamudio M.M. (voice), M.STEPHEN., CCC-SLP  Jules Rivera M.M. (voice), M.A., CCC-SLP  REAL Gonsalves (voice), M.S., CCC-SLP  [facility]  CLINICAL EVALUATION REPORT    Patient: Josette Gregg  Date of Service: 3/29/2022  Clinician: Angeline Gonsalves, MS, CCC-SLP  Seen in conjunction with: Dr. Daniela Rueda  Referring physician: Jessica Hogue MD    HISTORY  PATIENT INFORMATION  Josette Gregg is a 57 year old female presenting today for evaluation of something stuck in her throat. Salient details of her symptom history are as follows:    Chief complaint: Josette noticed a slight sore throat about a month ago and the next day it was gone, but she's since felt like there was something stuck in her throat. She waited about 1.5 weeks to see if it would go away, then reached out for an ENT referral. It's more pronounced after she eats, though she doesn't have any issues with eating or swallowing. It's also more noticeable when she lays down. It never goes away and has not happened in the past. She points to her larynx as the location of the \"something stuck\" but also will sometimes feel pressure up toward her ears.     Onset: early March 2021     Course: Worsening    CURRENT SYMPTOMS INCLUDE:    VOICE: Her voice has started to get tired the past week. 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 Her voice is slightly deeper and rougher when it's tired.     COUGH/THROAT CLEAR: Denies    SWALLOWING: No trouble with eating, other than feeling like pills didn't go down all the way on Monday. She'll notice the \"something stuck\" feeling more after she eats. She doesn't cough or choke when eating.     BREATHING: Denies    OTHER " "PERTINENT HISTORY    Reflux: Denies overt symptoms. No sore throat in the morning or indigestion after eating.     Post-Nasal Drip/Congestion: Denies    Please also refer to Dr. Daniela Rueda's dictation.     Past Medical History:   Diagnosis Date     Congenital hip dysplasia     breech, treated     Left shoulder pain 7/16/2012     Past Surgical History:   Procedure Laterality Date     ARTHROPLASTY MINIMALLY INVASIVE HIP Left 2/16/2015    Procedure: ARTHROPLASTY MINIMALLY INVASIVE HIP;  Surgeon: Edson Oviedo MD;  Location: [facility] OR     ARTHROSCOPY SHOULDER, OPEN BICEP TENODESIS REPAIR, COMBINED  4/9/2014    Procedure: COMBINED ARTHROSCOPY SHOULDER, OPEN BICEP TENODESIS REPAIR;;  Surgeon: Josh Love MD;  Location:  [facility]     ARTHROSCOPY SHOULDER, OPEN ROTATOR CUFF REPAIR, COMBINED  4/9/2014    Procedure: COMBINED ARTHROSCOPY SHOULDER, OPEN ROTATOR CUFF REPAIR;  Diagnostic Left SHOULDER ARTHROSCOPY,  OPEN ROTATOR CUFF REPAIR, Biceps Tenodesis;  Surgeon: Josh Love MD;  Location: [facility]     GYN SURGERY       HYSTERECTOMY  1/2008     ORTHOPEDIC SURGERY       US JOINT INJECTION ASPIRATION MAJOR RIGHT  11/3/2010    left shoulder injection [place] see scan       OBJECTIVE FINDINGS  Patient Supplied Answers To VHI Questionnaire  Voice Handicap Index (VHI-10) 3/23/2022   My voice makes it difficult for people to hear me 0   People have difficulty understanding me in a noisy room 0   My voice difficulties restrict my personal and social life.  0   I feel left out of conversations because of my voice 0   My voice problem causes me to lose income 0   I feel as though I have to strain to produce voice 0   The clarity of my voice is unpredictable 0   My voice problem upsets me 0   My voice makes me feel handicapped 0   People ask, \"What's wrong with your voice?\" 0   VHI-10 0        Patient Supplied Answers To CSI Questionnaire  Cough Severity Index (CSI) 3/23/2022   My cough is worse when I lie down 0   My " coughing problem causes me to restrict my personal and social life 0   I tend to avoid places because of my cough problem 0   I feel embarrassed because of my coughing problem 0   People ask, ''What's wrong?'' because I cough a lot 0   I run out of air when I cough 0   My coughing problem affects my voice 0   My coughing problem limits my physical activity 0   My coughing problem upsets me 0   People ask me if I am sick because I cough a lot 0   CSI Score 0        Patient Supplied Answers To EAT Questionnaire  Eating Assessment Tool (EAT-10) 3/23/2022   My swallowing problem has caused me to lose weight 0   My swallowing problem interferes with my ability to go out for meals 0   Swallowing liquids takes extra effort 0   Swallowing solids takes extra effort 0   Swallowing pills takes extra effort 0   Swallowing is painful 0   The pleasure of eating is affected by my swallowing 0   When I swallow food sticks in my throat 1   I cough when I eat 0   EAT-10 1        Patient Supplied Answers to Dyspnea Index Questionnaire:    No flowsheet data found.     PERCEPTUAL EVALUATION (27906)  VOICE/ SPEECH/ NON-COMMUNICATIVE LARYNGEAL BEHAVIORS EVALUATION    Palpation of the laryngeal area shows:    tenderness of the thyrohyoid area    reduced thyrohoid space    bilaterally    palpation induced cough    Breathing pattern:     shoulder and neck involvement and overall shallow breath pattern    Tension/Posture:    is not overtly evident    Cough/ Throat clear:    throat clear is primary, observed occasionally during today's session and locus of cough/throat clear sounds consistent with upper airway     Josette states today is a typical voice day, with clinician observing voice quality characterized by:    Roughness: Mild    Breathiness: Mild    Strain: Mild    Habitual pitch is 212 Hz and is WNL    Pitch glide reveals frequency range of 155-460 Hz    Loudness is WNL and is appropriate for the setting    Maximum Phonation Time: 20  seconds    GLOBAL ASSESSMENT OF DYSPHONIA: 18/100    LARYNGEAL EXAMINATION  Procedure: Flexible endoscopy with chip-tip technology without stroboscopy, left nostril; topical anesthesia with 3% Lidocaine and 0.25% phenylephrine was applied.   Performed by: Dr. Daniela Rueda  The laryngeal and pharyngeal structures were evaluated for gross appearance, mobility, function, and focal lesions / abnormalities of the associated mucosa.    All findings were within normal limits with the exception of the following salient features:     Moderate 4-way supraglottic hyperfunction    Mild edema and erythema of posterior cricoid region/arytenoids    The laryngeal exam was reviewed with Josette, and I provided pertinent explanations, as well as written and oral information.    STIMULABILITY: results of therapy probes during perceptual and laryngeal evaluation demonstrate improvement with coordination of respiration and phonation and use of yawn sigh    ASSESSMENT / PLAN  IMPRESSIONS: Josetet Gregg is presenting today with Globus Sensation (R09.89), Throat Pain (R07.0) and Dysphonia (R49.0) in the context of Irritable Larynx Syndrome (ILS) (J38.7) and Laryngeal Hyperfunction (J38.7). Laryngoscopy revealed moderate supraglottic hyperfunction and mild edema and erythema of the posterior cricoid region/arytenoids suggestive of LPR. She perceptually demonstrated mildly rough, strained, and breathy voice quality with non-optimal respiratory mechanics, and tenderness of the bilateral thyrohyoid space. She would benefit from a course of speech therapy targeting reduced laryngeal irritation (LPR), improved respiratory mechanics, decreased perilaryngeal hyperfunction, and phonatory techniques minimizing hyperfunction.     A course of speech therapy is recommended to improve voice quality and promote reduced discomfort, effort and fatigue.    She demonstrates a Good prognosis for improvement given adherence to therapeutic recommendations.      Positive indicators: positive response to therapy probes high level of comittment    Negative indicators: None    Research: n/a     DURATION / FREQUENCY: 4 bi-weekly therapy sessions    Goals:  Patient goal:    To understand the problem and fix it as much as possible     Short-term goal(s): Within the first 4 sessions, Josette will:  -- utilize vocal hygiene strategies in order to minimize laryngeal irritation and facilitate improved therapeutic outcomes with 90% accy.  -- demonstrate silent inhalation and abdominal breathing pattern in order to optimize breathing mechanics with 90% accy and min cues.  -- demonstrate lora-laryngeal release and laryngeal massage techniques with >80% accy  -- coordinate appropriate air flow levels with forward resonance during phonation in order to minimize laryngeal compensation and effort with 90% accy.    Long-term goal(s): In 3 months, Josette will:  -- report a 90% resolution of vioce and throat symptoms during a week of performing typical personal, social, and professional activities.    This treatment plan was developed with the patient who agreed with the recommendations.  _______________________________________________________________________  THERAPY NOTE (CPT 74394)  Date of Service: 3/29/2022    SUBJECTIVE / OBJECTIVE:  Please refer to my evaluation report from today's encounter for full details regarding subjective data, patient reported measures, and diagnostic findings.    THERAPEUTIC ACTIVITIES  Today Josette participated in the following therapeutic activities:  Counseling and Education:    Asked questions about the nature of her symptoms, and I answered all of these thoroughly.    Therapy protocol and rationale, including plan for today's session and future sessions    Education of laryngeal anatomy and physiology    Information regarding the complex interactions between laryngeal function and self-perpetuating cycles of irritation and hyper-reactivity with  application to her own reported symptoms and patterns of laryngeal impairment    Concepts and strategies for managing laryngopharyngeal reflux disorder, to reduce laryngeal inflammation. This included education regarding the impact of reflux on the laryngeal system and management tasks including diet modification, head of bed elevation, avoidance of oral intake prior to laying down, and stress management.    Exercises to coordinate phonation with optimal flowing airstream and reduce phonatory impact.     Semi-occluded vocal tract exercises with a straw and streamer were most facilitating    She progressed through the warm-up level of phonatory complexity    Instructed to use as a voice warm up, cool down, coordination of breath flow with phonation, and for tissue mobilization.    Specific modifications instructed included labial rounding    Patient demonstrated good learning, but will need practice and additional reinforcement    Instruction of Home Practice:    I instructed Josette in the concepts of an optimal practice regimen, including use of an interval schedule with brief periods of practice frequently throughout each day, and concepts of volitional practice to facilitate motor learning.    I emphasized the therapeutic rational and provided an After Visit Summary through [employer] to reinforce today's therapeutic activities and facilitate home practice.    PLAN: I will see Josette in 1 weeks, at which time we will target respiratory mechanics with application to streamer tasks and release of perilaryngeal hyperfunction.       TOTAL SERVICE TIME: 60 minutes  EVALUATION OF VOICE AND RESONANCE (44098)  TREATMENT (34338)  NO CHARGE FACILITY FEE (91435)    Emre Gonsalves (voice), M.S., CCC-SLP  Speech-Language Pathologist  [facility] Voice [facility]  [phone]  [email or web address]  Pronouns: she/her/hers      *this report was created in part through the use of computerized dictation software, and though  reviewed following completion, some typographic errors may persist.  If there is confusion regarding any of this notes contents, please contact me for clarification

## 2022-03-29 NOTE — PROGRESS NOTES
[facility]   at the [facility]   Otolaryngology Clinic     Patient: Josette Gregg    MRN: [id number]    : 1965    Age/Gender: 57 year old female  Date of Service: 3/29/2022  Rendering Provider:   Daniela Rueda MD     Referring Provider   PCP: Jessica Hogue  Referring Physician: Jessica Hogue MD  2020 [address] [address]  Reason for Consultation   Globus sensation  History   HISTORY OF PRESENT ILLNESS: I was asked to consult on Josette Gregg, by Dr. Hogue for evaluation of globus. Ms. Gregg is a 57 year old female who presents to us today for consultation.      Of note, the patient works in the OR nurse here at [employer].    she presents today for evaluation. she reports:    Globus  - sudden onset of a sore throat a few weeks ago  - sore throat lasted one day  - is more pronounced after eating or in the evenings when she lays down  - not worse in the morning  - does not have difficulty swallowing or food getting stuck  - endorses occasional lymph node swelling under her neck  - also endorses occasional bilateral ear pain  - denies snoring or teeth grinding    Dysphonia  - hoarseness for the last week   - can become more raspy with use  - high voice use in the OR  - no change in amount of voice use in the last few months  - no change in any stress or hours of work in the last few months  - tells  that she cannot talk  - was at a  3 days ago and felt like she could not sing  - has never happened before  - voice quality has not changed, just fatigue  - today feels like a normal voice    Dysphagia  - had difficulty swallowing Advil 2 days ago  - does not normally take pills  - otherwise denies  - no coughing or choking with food    Dyspnea  - denies    Throat clearing/cough  - denies  - none in the morning    GERD/LPRD   - does not feel indigestion  - no sore throat in the morning    PAST MEDICAL HISTORY:   Past Medical History:    Diagnosis Date     Congenital hip dysplasia     breech, treated     Left shoulder pain 7/16/2012     PAST SURGICAL HISTORY:   Past Surgical History:   Procedure Laterality Date     ARTHROPLASTY MINIMALLY INVASIVE HIP Left 2/16/2015    Procedure: ARTHROPLASTY MINIMALLY INVASIVE HIP;  Surgeon: Edson Oviedo MD;  Location:  OR     ARTHROSCOPY SHOULDER, OPEN BICEP TENODESIS REPAIR, COMBINED  4/9/2014    Procedure: COMBINED ARTHROSCOPY SHOULDER, OPEN BICEP TENODESIS REPAIR;;  Surgeon: Josh Love MD;  Location: [facility]     ARTHROSCOPY SHOULDER, OPEN ROTATOR CUFF REPAIR, COMBINED  4/9/2014    Procedure: COMBINED ARTHROSCOPY SHOULDER, OPEN ROTATOR CUFF REPAIR;  Diagnostic Left SHOULDER ARTHROSCOPY,  OPEN ROTATOR CUFF REPAIR, Biceps Tenodesis;  Surgeon: Josh Love MD;  Location: [facility]     GYN SURGERY       HYSTERECTOMY  1/2008     ORTHOPEDIC SURGERY       US JOINT INJECTION ASPIRATION MAJOR RIGHT  11/3/2010    left shoulder injection Jagdish see scan     CURRENT MEDICATIONS:   Current Outpatient Medications:      Multiple Vitamin (MULTIVITAMINS PO), Take 1 tablet by mouth daily, Disp: , Rfl:     ALLERGIES: Patient has no known allergies.    SOCIAL HISTORY:    Social History     Socioeconomic History     Marital status:      Spouse name: Not on file     Number of children: 1     Years of education: Not on file     Highest education level: Not on file   Occupational History     Not on file   Tobacco Use     Smoking status: Never Smoker     Smokeless tobacco: Never Used   Substance and Sexual Activity     Alcohol use: Yes     Comment: occasional     Drug use: No     Sexual activity: Yes     Partners: Male     Birth control/protection: Surgical   Other Topics Concern     Parent/sibling w/ CABG, MI or angioplasty before 65F 55M? No   Social History Narrative     Not on file     Social Determinants of Health     Financial Resource Strain: Not on file   Food Insecurity: Not on file    Transportation Needs: Not on file   Physical Activity: Not on file   Stress: Not on file   Social Connections: Not on file   Intimate Partner Violence: Not on file   Housing Stability: Not on file       FAMILY HISTORY:   Family History   Problem Relation Age of Onset     Rheumatoid Arthritis Mother      Melanoma No family hx of      Skin Cancer No family hx of      Non-contributory for problems with anesthesia    REVIEW OF SYSTEMS:   The patient was asked a 14 point review of systems regarding constitutional symptoms, eye symptoms, ears, nose, mouth, throat symptoms, cardiovascular symptoms, respiratory symptoms, gastrointestinal symptoms, genitourinary symptoms, musculoskeletal symptoms, integumentary symptoms, neurological symptoms, psychiatric symptoms, endocrine symptoms, hematologic/lymphatic symptoms, and allergic/ immunologic symptoms.   The pertinent factors have been included in the HPI and below.  Patient Supplied Answers to Review of Systems  [facility] ROS 3/23/2022   Ears, Nose, Throat Ear pain     Physical Examination   The patient underwent a physical examination as described below. The pertinent positive and negative findings are summarized after the description of the examination.  Constitutional: The patient's developmental and nutritional status was assessed. The patient's voice quality was assessed.  Head and Face: The head and face were inspected for deformities. The sinuses were palpated. The salivary glands were palpated. Facial muscle strength was assessed bilaterally.  Eyes: Extraocular movements and primary gaze alignment were assessed.  Ears, Nose, Mouth and Throat: The ears and nose were examined for deformities. The nasal septum, mucosa, and turbinates were inspected by anterior rhinoscopy. The lips, teeth, and gums were examined for abnormalities. The oral mucosa, tongue, palate, tonsils, lateral and posterior pharynx were inspected for the presence of asymmetry or mucosal lesions.     Neck: The tracheal position was noted, and the neck mass palpated to determine if there were any asymmetries, abnormal neck masses, thyromegally, or thyroid nodules.  Respiratory: The nature of the breathing and chest expansion/symmetry was observed.  Cardiovascular: The patient was examined to determine the presence of any edema or jugular venous distension.  Abdomen: The contour of the abdomen was noted.  Lymphatic: The patient was examined for infraclavicular lymphadenopathy.  Musculoskeletal: The patient was inspected for the presence of skeletal deformities.  Extremities: The extremities were examined for any clubbing or cyanosis.  Skin: The skin was examined for inflammatory or neoplastic conditions.  Neurologic: The patient's orientation, mood, and affect were noted. The cranial nerve  functions were examined.  Other pertinent positive and negative findings on physical examination:      OC/OP: no lesions, uvula midline, soft palate elevates symmetrically Neck: no lesions, bilateral TH tenderness to palpation, mild need to cough with TH pressure     All other physical examination findings were within normal limits and noncontributory.  Procedures   BEHAVIORAL & QUALITATIVE EVALUATION OF VOICE AND RESONENCE   Comments: F0 212 Hz  MPT: 20 seconds  Vocal Quality: Normal    Pitch Range:  Normal 155 - 460 Hz  Phrase Length:  Normal  Vocal Loudness: Normal  Dysarthria: No    Flexible laryngoscopy (CPT 19052)      Pre-procedure diagnosis: globus sensation   Post-procedure diagnosis: same as above  Indication for procedure: Ms. Gregg is a 57 year old female with see above  Procedure(s): Fiberoptic Laryngoscopy    Details of Procedure: After informed consent was obtained, the patient was seated in the examination chair.  The areas of the nasopharynx as well as the hypopharynx were anesthetized with topical 4% lidocaine with 0.25% phenylephrine atomizer.  Examination of the base of tongue was performed first.   Attention was directed to any evidence of masses in the area or evidence of leukoplakia or candidal infection.  Attention was directed to the epiglottis where its size and position was determined and its movement on phonation of the vowel  e .  The piriform sinuses were then inspected for any mass lesions or pooling of secretions.  Attention was then directed to the larynx. The vocal folds were inspected for infection or any areas of leukoplakia, for masses, polypoid degeneration, or hemorrhage.  Having done this, the arytenoids and vocal processes were inspected for erythema or evidence of granuloma formation.  The posterior commissure was then inspected for evidence of inflammatory changes in the mucosa and heaping up of mucosal tissue. The patient was then instructed to say the vowel  e .  Adduction of vocal folds to the midline was observed for any evidence of paresis or paralysis of the larynx or asymmetry in rotation of the larynx to the left or right. The patient was asked to breathe and the degree of abduction was noted bilaterally.  Subglottic view of the larynx was obtained for any additional mass lesions or mucosal changes.  Finally the post cricoid was examined for evidence of pooling of secretions, as well as the pharyngeal wall mucosa.   Anesthesia type: 0.25% phenylephrine    Findings:  Anatomic/physiological deviations: RNC, supraglottic hyperfunction, no pooling of saliva, postcricoid edema, right and left arytenoids at times during adduction appear asymmetric, however, abduction is normal and well coordinated   Right vocal process: No restriction of mobility   Left vocal process: No restriction of mobility  Glottal gap: Complete glottal closure  Supraglottic structures: Normal  Hypopharynx: Normal     Estimated Blood Loss: minimal  Complications: None  Disposition: Patient tolerated the procedure well              Review of Relevant Clinical Data   I personally reviewed:  Notes: Jessica Kebede  "- PCP 3/14/22    Labs:  No results found for: TSH  Lab Results   Component Value Date     02/18/2015    CO2 30 02/18/2015    BUN 10 02/18/2015    PHOS 3.6 02/20/2015     Lab Results   Component Value Date    WBC 12.6 (H) 02/17/2015    HGB 10.0 (L) 02/19/2015    HCT 33.0 (L) 02/17/2015    MCV 90 02/17/2015     (L) 02/19/2015     Lab Results   Component Value Date    PTT 26 02/16/2015    INR 1.18 (H) 02/16/2015     No results found for: LEANNA  No components found for: RHEUMATOIDFACTOR,  RF  Lab Results   Component Value Date    CRP <3.0 08/20/2008     No components found for: CKTOT, URICACID  No components found for: C3, C4, DSDNAAB, NDNAABIFA  No results found for: MPOAB    Patient reported Quality of Life (QOL) Measures   Patient Supplied Answers To VHI Questionnaire  Voice Handicap Index (VHI-10) 3/23/2022   My voice makes it difficult for people to hear me 0   People have difficulty understanding me in a noisy room 0   My voice difficulties restrict my personal and social life.  0   I feel left out of conversations because of my voice 0   My voice problem causes me to lose income 0   I feel as though I have to strain to produce voice 0   The clarity of my voice is unpredictable 0   My voice problem upsets me 0   My voice makes me feel handicapped 0   People ask, \"What's wrong with your voice?\" 0   VHI-10 0     Patient Supplied Answers To EAT Questionnaire  Eating Assessment Tool (EAT-10) 3/23/2022   My swallowing problem has caused me to lose weight 0   My swallowing problem interferes with my ability to go out for meals 0   Swallowing liquids takes extra effort 0   Swallowing solids takes extra effort 0   Swallowing pills takes extra effort 0   Swallowing is painful 0   The pleasure of eating is affected by my swallowing 0   When I swallow food sticks in my throat 1   I cough when I eat 0   EAT-10 1     Patient Supplied Answers To CSI Questionnaire  Cough Severity Index (CSI) 3/23/2022   My cough is " worse when I lie down 0   My coughing problem causes me to restrict my personal and social life 0   I tend to avoid places because of my cough problem 0   I feel embarrassed because of my coughing problem 0   People ask, ''What's wrong?'' because I cough a lot 0   I run out of air when I cough 0   My coughing problem affects my voice 0   My coughing problem limits my physical activity 0   My coughing problem upsets me 0   People ask me if I am sick because I cough a lot 0   CSI Score 0     Patient Supplied Answers to Dyspnea Index Questionnaire:  No flowsheet data found.    Impression & Plan     IMPRESSION: Ms. Gregg is a 57 year old female who is being seen for the followin. Globus sensation  - sudden onset of a sore throat a few weeks ago for one day  - has felt sensation of something in her throat and voice changes constantly since  - is more pronounced after eating or in the evenings when she lays down  - does not have difficulty swallowing or food getting stuck  - worse with use and gets more raspy  - notices more vocal fatigue  - scope 3/29/22 shows supraglottic hyperfunction, no pooling of saliva, postcricoid edema, right and left arytenoids at times during adduction appear asymmetric, however, abduction is normal and well coordinated  - discussed doing an Xray Video Swallow Exam with esophagram to evaluate GI etiology  - discussed symptoms likely due to laryngopharyngeal reflux and muscle tension dysphonia  - discussed treatment is voice therapy  - discussed screening with Xray Video Swallow Exam and esophagram with reflux precautions  - if not improved after therapy, we will refer to Dr. Rust for reflux management  Plan  - reflux precautions  - Xray Video Swallow Exam with esophagram   - follow up as needed after therapy  - referral to Dr. Rust after therapy if symptoms do not improve      RETURN VISIT: after therapy      Scribe Disclosure:  I, Lesley Villanueva, am serving as a scribe to document  services personally performed by Daniela Rueda MD at this visit, based upon the provider's statements to me. All documentation has been reviewed by the aforementioned provider prior to being entered into the official medical record.       Thank you for the kind referral and for allowing me to share in the care of Ms. rGegg. If you have any questions, please do not hesitate to contact me.    Daniela Rueda MD    Laryngology    [facility]  Department of  Otolaryngology - Head and Neck Surgery  Cl[place] [address]  Appointment line: [phone]  Fax: [phone]  [email or web address]

## 2022-03-31 ENCOUNTER — OFFICE VISIT (OUTPATIENT)
Dept: OTOLARYNGOLOGY | Facility: CLINIC | Age: 57
End: 2022-03-31
Payer: COMMERCIAL

## 2022-03-31 DIAGNOSIS — R07.0 THROAT PAIN: ICD-10-CM

## 2022-03-31 DIAGNOSIS — J38.7 IRRITABLE LARYNX SYNDROME: ICD-10-CM

## 2022-03-31 DIAGNOSIS — J38.7 LARYNGEAL HYPERFUNCTION: ICD-10-CM

## 2022-03-31 DIAGNOSIS — R09.A2 GLOBUS SENSATION: ICD-10-CM

## 2022-03-31 DIAGNOSIS — R49.0 DYSPHONIA: Primary | ICD-10-CM

## 2022-03-31 PROCEDURE — 92507 TX SP LANG VOICE COMM INDIV: CPT | Mod: GN | Performed by: SPEECH-LANGUAGE PATHOLOGIST

## 2022-03-31 NOTE — PROGRESS NOTES
[facility]  PROGRESS REPORT (CPT 20320)    Patient: Josette Gregg  Date of Service: 3/31/2022  Date of Last Service: 3/29/22  Number of Visits: 2  Referring Physician: Dr. Daniela Rueda and Jessica Hogue MD  Impressions from evaluation on 3/29/22 by Emre Gonsalves (voice), MJomarS., CCC-SLP:  Josette Gregg is presenting today with Globus Sensation (R09.89), Throat Pain (R07.0) and Dysphonia (R49.0) in the context of Irritable Larynx Syndrome (ILS) (J38.7) and Laryngeal Hyperfunction (J38.7). Laryngoscopy revealed moderate supraglottic hyperfunction and mild edema and erythema of the posterior cricoid region/arytenoids suggestive of LPR. She perceptually demonstrated mildly rough, strained, and breathy voice quality with non-optimal respiratory mechanics, and tenderness of the bilateral thyrohyoid space. She would benefit from a course of speech therapy targeting reduced laryngeal irritation (LPR), improved respiratory mechanics, decreased perilaryngeal hyperfunction, and phonatory techniques minimizing hyperfunction.     SUBJECTIVE:  Since Josette's last session, she reports the following:   o Overall she has not had any change in her symptoms, which is expected, as she has only participated in an evaluation thus far and no therapeutic suggestions were made at the time.  o She feels like she's somewhat gotten used to her throat discomfort.   o She woke up last night during the night and felt like she had a sore throat.   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     OBJECTIVE:  Patient Specific Goal Metrics:  Dysponia SLP Goals 3/31/2022   How would you rate your speaking voice quality, if 0 is worst voice quality, and 10 is best voice? 8   How how severe is your [Throat Discomfort - or use patient specific description], if 0 is no [discomfort] at all and 10 is the worst [discomfort]? 7   How much does your throat  discomfort bother you?     Somewhat       THERAPEUTIC ACTIVITIES  Today Josette participated in the following therapeutic activities:  Counseling and Education:    Asked questions about the nature of her symptoms, and I answered all of these thoroughly.    Therapy protocol and rationale, including plan for today's session and future sessions    Education of laryngeal anatomy and physiology    I provided Josette with explanation and skilled instruction of:  Education and exercises to promote optimal respiratory mechanics were provided:    Explanation of the anatomy and physiology of respiration for phonation; she found this to be helpful    She demonstrated excessive upper thoracic engagement during inhalation    Difficulty allowing abdominal relaxation for inhalation, and audible inhalation prior to instruction    Targeted practice of optimal breathing mechanics with patient positioned in supine, sitting, and a forward leaning position    With clinician support, patient was able to demonstrate improved abdominal relaxation and engagement on inhalation    Josette demonstrated good accuracy with moderate clinician support, including verbal explanation, visual demonstration and tactile reinforcement to facilitate awareness of low respiratory engagement.     Manual laryngeal massage was performed:  ? Patient endorsed tenderness near the thyrohyoid space  ? She underwent gentle manual release along the sternocleidomastoids and sternohyoid/omohyoid, and thyrohyoid space.   ? She endorsed feeling that the SCM and anterior release were particularly helpful  ? She was instructed in a gentle self-massage technique targeting the SCM and .     Instruction of Home Practice:    I instructed Josette in the concepts of an optimal practice regimen, including use of an interval schedule with brief periods of practice frequently throughout each day, and concepts of volitional practice to facilitate motor learning.    I emphasized  the therapeutic rational and provided an After Visit Summary through [employer] to reinforce today's therapeutic activities and facilitate home practice.    ASSESSMENT/PLAN  Progress toward long-term goals:  Minimal at this point, as this is first session, but good learning today    Impressions:  IMPRESSIONS: Globus Sensation (R09.89), Throat Pain (R07.0) and Dysphonia (R49.0) in the context of Irritable Larynx Syndrome (ILS) (J38.7) and Laryngeal Hyperfunction (J38.7)    Josette had a productive session of therapy today, working on manual release and respiratory mechanics. She will continue to work on her exercises on a daily basis, and work on incorporating the techniques into her daily activities. Speech therapy continues to be medically necessary for Josette to participate fully and engage in activities of daily living.     Plan:   I will see Josette in 3 weeks and will plan to combine abdominal pattern with streamer tasks, progressing as able. Continue with deactivation strategies/manual release. Introduce swallow coordination.   For practice goals, see AVS.     TOTAL SERVICE TIME: 60 minutes  TREATMENT (39769)  NO CHARGE FACILITY FEE    Emre Gonsalves (voice), M.S., CCC-SLP  Speech-Language Pathologist  [facility]  [phone]  [email or web address]  Pronouns: she/her/hers      *this report was created in part through the use of computerized dictation software, and though reviewed following completion, some typographic errors may persist.  If there is confusion regarding any of this notes contents, please contact me for clarification

## 2022-03-31 NOTE — LETTER
3/31/2022       RE: Josette Gregg  [address]  [facility] [address]     Dear Colleague,    Thank you for referring your patient, Josette Gregg, to the [facility]ICE [facility] [place] at . Please see a copy of my visit note below.    [facility] VOICE CLINIC  PROGRESS REPORT (CPT 26846)    Patient: Josette Gregg  Date of Service: 3/31/2022  Date of Last Service: 3/29/22  Number of Visits: 2  Referring Physician: Dr. Daniela Rueda and Jessica Hogue MD  Impressions from evaluation on 3/29/22 by Emre Gonsalves (voice), MJomarSJomar, CCC-SLP:  Josette Gregg is presenting today with Globus Sensation (R09.89), Throat Pain (R07.0) and Dysphonia (R49.0) in the context of Irritable Larynx Syndrome (ILS) (J38.7) and Laryngeal Hyperfunction (J38.7). Laryngoscopy revealed moderate supraglottic hyperfunction and mild edema and erythema of the posterior cricoid region/arytenoids suggestive of LPR. She perceptually demonstrated mildly rough, strained, and breathy voice quality with non-optimal respiratory mechanics, and tenderness of the bilateral thyrohyoid space. She would benefit from a course of speech therapy targeting reduced laryngeal irritation (LPR), improved respiratory mechanics, decreased perilaryngeal hyperfunction, and phonatory techniques minimizing hyperfunction.     SUBJECTIVE:  Since Josette's last session, she reports the following:   o Overall she has not had any change in her symptoms, which is expected, as she has only participated in an evaluation thus far and no therapeutic suggestions were made at the time.  o She feels like she's somewhat gotten used to her throat discomfort.   o She woke up last night during the night and felt like she had a sore throat.   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     OBJECTIVE:  Patient Specific Goal  Metrics:  Dysponia SLP Goals 3/31/2022   How would you rate your speaking voice quality, if 0 is worst voice quality, and 10 is best voice? 8   How how severe is your [Throat Discomfort - or use patient specific description], if 0 is no [discomfort] at all and 10 is the worst [discomfort]? 7   How much does your throat discomfort bother you?     Somewhat       THERAPEUTIC ACTIVITIES  Today Josette participated in the following therapeutic activities:  Counseling and Education:    Asked questions about the nature of her symptoms, and I answered all of these thoroughly.    Therapy protocol and rationale, including plan for today's session and future sessions    Education of laryngeal anatomy and physiology    I provided Josette with explanation and skilled instruction of:  Education and exercises to promote optimal respiratory mechanics were provided:    Explanation of the anatomy and physiology of respiration for phonation; she found this to be helpful    She demonstrated excessive upper thoracic engagement during inhalation    Difficulty allowing abdominal relaxation for inhalation, and audible inhalation prior to instruction    Targeted practice of optimal breathing mechanics with patient positioned in supine, sitting, and a forward leaning position    With clinician support, patient was able to demonstrate improved abdominal relaxation and engagement on inhalation    Josette demonstrated good accuracy with moderate clinician support, including verbal explanation, visual demonstration and tactile reinforcement to facilitate awareness of low respiratory engagement.     Manual laryngeal massage was performed:  ? Patient endorsed tenderness near the thyrohyoid space  ? She underwent gentle manual release along the sternocleidomastoids and sternohyoid/omohyoid, and thyrohyoid space.   ? She endorsed feeling that the SCM and anterior release were particularly helpful  ? She was instructed in a gentle self-massage  technique targeting the SCM and .     Instruction of Home Practice:    I instructed Josette in the concepts of an optimal practice regimen, including use of an interval schedule with brief periods of practice frequently throughout each day, and concepts of volitional practice to facilitate motor learning.    I emphasized the therapeutic rational and provided an After Visit Summary through [employer] to reinforce today's therapeutic activities and facilitate home practice.    ASSESSMENT/PLAN  Progress toward long-term goals:  Minimal at this point, as this is first session, but good learning today    Impressions:  IMPRESSIONS: Globus Sensation (R09.89), Throat Pain (R07.0) and Dysphonia (R49.0) in the context of Irritable Larynx Syndrome (ILS) (J38.7) and Laryngeal Hyperfunction (J38.7)    Josette had a productive session of therapy today, working on manual release and respiratory mechanics. She will continue to work on her exercises on a daily basis, and work on incorporating the techniques into her daily activities. Speech therapy continues to be medically necessary for Josette to participate fully and engage in activities of daily living.     Plan:   I will see Josette in 3 weeks and will plan to combine abdominal pattern with streamer tasks, progressing as able. Continue with deactivation strategies/manual release. Introduce swallow coordination.   For practice goals, see AVS.     TOTAL SERVICE TIME: 60 minutes  TREATMENT (71802)  NO CHARGE FACILITY FEE    Emre Gonsalves (voice), M.S., CCC-SLP  Speech-Language Pathologist  [facility] Voice [facility]  [phone]  [email or web address]  Pronouns: she/her/hers      *this report was created in part through the use of computerized dictation software, and though reviewed following completion, some typographic errors may persist.  If there is confusion regarding any of this notes contents, please contact me for clarification      Again, thank you for allowing me  to participate in the care of your patient.      Sincerely,    Herminia Shaw, SLP

## 2022-04-19 ENCOUNTER — VIRTUAL VISIT (OUTPATIENT)
Dept: OTOLARYNGOLOGY | Facility: CLINIC | Age: 57
End: 2022-04-19
Payer: COMMERCIAL

## 2022-04-19 DIAGNOSIS — R49.0 DYSPHONIA: Primary | ICD-10-CM

## 2022-04-19 DIAGNOSIS — R09.A2 GLOBUS SENSATION: ICD-10-CM

## 2022-04-19 DIAGNOSIS — J38.7 LARYNGEAL HYPERFUNCTION: ICD-10-CM

## 2022-04-19 DIAGNOSIS — J38.7 IRRITABLE LARYNX SYNDROME: ICD-10-CM

## 2022-04-19 DIAGNOSIS — R07.0 THROAT PAIN: ICD-10-CM

## 2022-04-19 PROCEDURE — 92507 TX SP LANG VOICE COMM INDIV: CPT | Mod: GN | Performed by: SPEECH-LANGUAGE PATHOLOGIST

## 2022-04-19 NOTE — PATIENT INSTRUCTIONS
"Hello!     It was a pleasure to see you today. Please complete the exercises below and remember, a few minutes of practice many times throughout the day is more important than one large practice session. If you have any questions about your strategies, feel free to contact me at [email or web address] or via [employer]. Please call [phone] for scheduling alterations.      Please remember to logon to [employer] a few minutes early to complete the questionnaires before your visit starts.      Home Exercise Program:  BELLY BREATHING (10 breaths AM/mid-day/PM)  SET-UP: Imagine stretching your arms toward the ceiling- but you can just stretch your shoulders in order to avoid raising your left arm. Feel a stretch through the sides of your ribcage and take two slow, deep breaths in and out. Next lean your shoulders toward each side, feeling your ribcage lift and expand along the opposite side and take two slow, deep breaths in and out on each side. Then arch your back to feel a stretch along the front of your ribcage and take two slow, deep breaths in and out. Then lean forward, relaxing over your knees, and allow your back and neck to stretch and take two slow, deep breaths in and out feeling your back stretch and expand.   PRACTICE:   Sit hinged forward with your elbows on your knees. OR Lay on your back OR Sit normally and place your hands on your LOW BELLY and small of your back.  Slowly breathe in and feel your belly and back expand, like filling a BIG BLUE BALLOON, pushing out against your waistband  Slowly breathe out and feel your belly and back crunch inward, like zipping tight pants  Repeat slowly and take breaks if you get dizzy  ____________________________________________________     SCM Release  Gently massage the muscle that starts at the bump behind your ear and curves around to the front of your throat, to the little \"twiggies\" at the bottom. You can gently knead with your thumb and the flat part of " "your pointer on each side. Do one side at a time.   Anterior Larynx Release  Place your fingers flat along the base of your throat, about an inch above the \"twiggies\". Gently apply pressure in a downward motion. If it feels helpful, you can use the thumb on your other hand to gently stretch upward under your chin, to create an opposite direction stretch. The intensity should not exceed 4/10.   Heat & Cold  You can apply heat if it feels helpful to get the muscles to relax, but always finish with a cold pack to reduce inflammation.     REFLUX MANAGEMENT (ongoing as needed)  -- When reflux comes up the wrong way from your stomach and spills into your throat, you may not feel it come up if the acidity levels are low. However, liquid spilling onto the vocal cords can cause lots of throat irritation, cough and throat clearing, hoarseness, and even difficulty breathing.   1. Elevate the head of your bed about 4 inches, trying to keep the mattress straight to avoid any back or neck aches.   2. Avoid eating/drinking close to when you lay down. The less there is in your stomach, the less can spill back up during the night.   3. Try to eat foods with a high PH level (less acidic). Possible reflux triggers include spicy foods, acidic foods, and foods that may relax the esophageal sphincters and allow back-splash, such as raw onions, garlic, or peppers, store-bought tomato sauces/salsas, pizza, greasy foods, orange or lemon juice, chocolate, caffeine, carbonation, alcohol, mints and menthol. Try to avoid these foods for about a month so your throat has time to heal and calm down, then reintroduce 1 food every 3-4 days, using a notebook to track any increase in symptoms to figure out which foods are your trigger foods.   4. Avoid putting pressure on your stomach by slouching, wearing tight clothes, or carrying extra weight that pushes on your stomach.   5. Actively reduce stress. Stress can cause a fight or flight response in " "your body, even if you feel like you're able to handle stress. Try deep breathing, yoga, or mindfulness meditation to help calm your nervous system (Calm, Headspace, Yoga with Kandace on youtube, etc). Square Breathing is an easy to use stress management tool: Inhale for 4 seconds, hold your breath for 4 seconds, exhale for 4 seconds, and hold your breath for 4 seconds. Repeat at least 4 times.      VOICE EXERCISES  -- Inhale SILENTLY and feel your belly fill with air  -- Slowly exhale as your belly contracts, ROUND \"OO\" LIPS  -- Hold your streamer about 6 inches from the end of your straw  -- Think of saying \"Whoooooo\" and watch for a smooth, gentle streamer movement, like a flag blowing in a breeze  3-5x silently with just air for 5-7 seconds  3-5x short and easy sighs on \"Whooo\"  3-5x foghorn on single pitch for 5-7 seconds  3-5x sliding lower for 5-7 seconds (\"ding dong\")  3-5x sliding higher for 5-7 seconds (\"here comes the bride\")  3-5x sliding up and down like sirens  Bonus: Easy songs/melodies with slurred phrases and no stopping streamer  -- Double-check that your inhale is a SILENT yawn and your LIPS are ROUNDED     BUZZY VOICE  Practice throughout the day, whenever you get a chance  1. 3x  whooommm  or \"hmmm\" for 5 seconds   Focus on \"nasal\" vibrations in your lips/nose on \"mmm\"  Create \"molar space\" to relax your jaw open slightly  2. Moomoomoo, moemoemoe, mememe, mamama, mymymy, maymaymay, mehmehmeh  3. Palafox, mom, mean, man, mine, moan, men, main, morning, meaning, manners, mammals  4. Sentences:  Mother knits many mittens. Dalton makes much money. Nobody knows Michell s name.  Allyssa s measles made Allyssa miserable. Milking machines make much noise. Mild-mannered Chica  kristina Chinchilla. Edson marched many, many miles. Millions made money in the market in March. My mother munches melon on Mondays.Maybe my memory needs mending. Abby made more money. Allyssa made many muffins. Merlin motored many miles. Edson" "makes major messes. My momma made meatloaf. Music makes mellow moods.  5.  Mmmy favorite ... is ...  or \"nnnI like...\"   6. Reading out loud, starting after each breath with a \"hmm...\"  7. Conversation, narrating yourself, road signs, license plates, etc.   Chanted on 1 pitch for now, like a monk or robot. Double-check your abs crunching. Hands adding downward stretch along the base of your throat.     Swallow Coordination  1. Suction your tongue to the roof of your mouth with the tip just behind your top two teeth, hold for 20 seconds.   2. Practice collecting your saliva into the center of your tongue with the tongue cupped against the roof.   3. Make sure your tongue tip is in THE SPOT, jaw is closed, then swallow, pressing your tongue against the roof from tip to back.   4. Gradually move to sips of water with a straw, then spoonfuls (transfer from the spoon to the cupped tongue and glide the tongue out from between your lips), then small bites of solids. 10x/day    Thank you and have a great day!  Herminia  "

## 2022-04-19 NOTE — LETTER
4/19/2022       RE: Josette Gregg  [address]  [facility] [address]     Dear Colleague,    Thank you for referring your patient, Josette Gregg, to the [facility]ICE CLINIC [place] at [facility]. Please see a copy of my visit note below.    Josette Gregg is a 57 year old female who is being evaluated via a billable video visit.      Josette has been notified and verbally consented to the following:     This video visit will be conducted between you and your provider.    Patient has opted to conduct today's video visit vs an in-person appointment.     Video visits are billed at different rates depending on your insurance coverage. Please reach out to your insurance provider with any questions.     If during the course of the call the provider feels the appointment is not appropriate, you will not be charged for this service.  Provider has received verbal consent for billable virtual visit from the patient? Yes  Will anyone else be joining your video visit? No    Call initiated at: 1000   Type of Visit Platform Used: [employer] Video  Location of provider: Home  Location of patient: [facility] VOICE CLINIC  PROGRESS REPORT (CPT 63053)    Patient: Josette Gregg  Date of Service: 4/19/2022  Date of Last Service: 3/31/22  Number of Visits: 3  Referring Physician: Dr. Daniela Rueda and Jessica Hogue MD  Impressions from evaluation on 3/29/22 by Emre Gonsalves (voice) MJomarSJomar, CCC-SLP:  Josette Gregg is presenting today with Globus Sensation (R09.89), Throat Pain (R07.0) and Dysphonia (R49.0) in the context of Irritable Larynx Syndrome (ILS) (J38.7) and Laryngeal Hyperfunction (J38.7). Laryngoscopy revealed moderate supraglottic hyperfunction and mild edema and erythema of the posterior cricoid region/arytenoids suggestive of LPR. She perceptually demonstrated mildly rough, strained, and breathy voice quality with non-optimal respiratory  mechanics, and tenderness of the bilateral thyrohyoid space. She would benefit from a course of speech therapy targeting reduced laryngeal irritation (LPR), improved respiratory mechanics, decreased perilaryngeal hyperfunction, and phonatory techniques minimizing hyperfunction.     SUBJECTIVE:  Since Josette's last session, she reports the following:   o Overall symptoms are improving.   o The feeling of something in her throat is much better, though she still gets a sense of tightness in her throat. She'll use her strategies with some success.   o She noticed more symptoms if she was talking a lot, with a slight sore throat.     OBJECTIVE:  Patient Specific Goal Metrics:  Dysponia SLP Goals 3/31/2022   How would you rate your speaking voice quality, if 0 is worst voice quality, and 10 is best voice? 8   How how severe is your [Throat Discomfort - or use patient specific description], if 0 is no [discomfort] at all and 10 is the worst [discomfort]? 7   How much does your throat discomfort bother you?     Somewhat       THERAPEUTIC ACTIVITIES  Today Josette participated in the following therapeutic activities:  Counseling and Education:    Asked questions about the nature of her symptoms, and I answered all of these thoroughly.    I provided Josette with explanation and skilled instruction of:  Exercises to re-coordinate the oral and pharyngeal musculature to reduce constriction and laryngeal elevation in swallowing    Instructions for tongue positioning in a manner that reduces laryngeal elevation and BOT constriction    Coordination of bolus collection and propulsion, stating with anterior to posterior lingual compression    Progression to swallows of saliva, liquid, and solids, using a straw, cup sip, spoon    Resonant Voice Therapy (RVT) exercises to promote forward locus of resonance and optimized pattern of laryngeal adduction  ? Easy descending glide on /m/ utilized in conjunction with relaxed jaw, tongue,  "and lightly closed lips to facilitate forward resonant sound  ? Use of the carrier phrase \"mmhmm\" instructed to promote generalization to everyday speech  ? Word level exercises featuring nasal continuant loaded stimuli  ? Special care was taken to maintain sense of open pharyngeal space for broadened resonance in conjunction with forward resonant principles  ? Use of inhalation to \"set the instrument\" for the remainder of the statement was facilitating.  ? Phrase level exercises featuring nasal continuants in more complex phonemic contexts were employed    Good accuracy with moderate support    Tendency towards lower than optimal pitch    Instruction of Home Practice:    A revised regimen for home practice was instructed.    I provided an AVS of important notes of today's therapeutic activities to facilitate practice.    ASSESSMENT/PLAN  Progress toward long-term goals:  Adequate but incomplete progress; please see above    Impressions:  IMPRESSIONS: Globus Sensation (R09.89), Throat Pain (R07.0) and Dysphonia (R49.0) in the context of Irritable Larynx Syndrome (ILS) (J38.7) and Laryngeal Hyperfunction (J38.7)     Josette had a productive session of therapy today, working on swallow recoordination and resonant voice progression to chanted conversation.  She will continue to work on her exercises on a daily basis, and work on incorporating the techniques into her daily activities. Speech therapy continues to be medically necessary for Josette to participate fully and engage in activities of daily living.     Plan:   I will see Josette in 3 weeks and will plan to add pitch variability to resonant voice tasks, potentially add rescue breathing to reduce throat tightness. Add session or discharge.   For practice goals, see AVS.     TOTAL SERVICE TIME: 50 minutes  TREATMENT (76616)  NO CHARGE FACILITY FEE      *this report was created in part through the use of computerized dictation software, and though reviewed " following completion, some typographic errors may persist.  If there is confusion regarding any of this notes contents, please contact me for clarification          Again, thank you for allowing me to participate in the care of your patient.      Sincerely,    Hreminia Shaw, SLP

## 2022-04-19 NOTE — PROGRESS NOTES
Josette Gregg is a 57 year old female who is being evaluated via a billable video visit.      Josette has been notified and verbally consented to the following:     This video visit will be conducted between you and your provider.    Patient has opted to conduct today's video visit vs an in-person appointment.     Video visits are billed at different rates depending on your insurance coverage. Please reach out to your insurance provider with any questions.     If during the course of the call the provider feels the appointment is not appropriate, you will not be charged for this service.  Provider has received verbal consent for billable virtual visit from the patient? Yes  Will anyone else be joining your video visit? No    Call initiated at: 1000   Type of Visit Platform Used: [employer] Video  Location of provider: Home  Location of patient: [facility]  PROGRESS REPORT (CPT 45725)    Patient: Josette Gregg  Date of Service: 4/19/2022  Date of Last Service: 3/31/22  Number of Visits: 3  Referring Physician: Dr. Daniela Rueda and Jessica Hogue MD  Impressions from evaluation on 3/29/22 by Emre Gonsalves (voice), M.S., CCC-SLP:  Josette Gregg is presenting today with Globus Sensation (R09.89), Throat Pain (R07.0) and Dysphonia (R49.0) in the context of Irritable Larynx Syndrome (ILS) (J38.7) and Laryngeal Hyperfunction (J38.7). Laryngoscopy revealed moderate supraglottic hyperfunction and mild edema and erythema of the posterior cricoid region/arytenoids suggestive of LPR. She perceptually demonstrated mildly rough, strained, and breathy voice quality with non-optimal respiratory mechanics, and tenderness of the bilateral thyrohyoid space. She would benefit from a course of speech therapy targeting reduced laryngeal irritation (LPR), improved respiratory mechanics, decreased perilaryngeal hyperfunction, and phonatory techniques minimizing hyperfunction.     SUBJECTIVE:  Since Josette's last  "session, she reports the following:   o Overall symptoms are improving.   o The feeling of something in her throat is much better, though she still gets a sense of tightness in her throat. She'll use her strategies with some success.   o She noticed more symptoms if she was talking a lot, with a slight sore throat.     OBJECTIVE:  Patient Specific Goal Metrics:  Dysponia SLP Goals 3/31/2022   How would you rate your speaking voice quality, if 0 is worst voice quality, and 10 is best voice? 8   How how severe is your [Throat Discomfort - or use patient specific description], if 0 is no [discomfort] at all and 10 is the worst [discomfort]? 7   How much does your throat discomfort bother you?     Somewhat       THERAPEUTIC ACTIVITIES  Today Josette participated in the following therapeutic activities:  Counseling and Education:    Asked questions about the nature of her symptoms, and I answered all of these thoroughly.    I provided Josette with explanation and skilled instruction of:  Exercises to re-coordinate the oral and pharyngeal musculature to reduce constriction and laryngeal elevation in swallowing    Instructions for tongue positioning in a manner that reduces laryngeal elevation and BOT constriction    Coordination of bolus collection and propulsion, stating with anterior to posterior lingual compression    Progression to swallows of saliva, liquid, and solids, using a straw, cup sip, spoon    Resonant Voice Therapy (RVT) exercises to promote forward locus of resonance and optimized pattern of laryngeal adduction  ? Easy descending glide on /m/ utilized in conjunction with relaxed jaw, tongue, and lightly closed lips to facilitate forward resonant sound  ? Use of the carrier phrase \"mmhmm\" instructed to promote generalization to everyday speech  ? Word level exercises featuring nasal continuant loaded stimuli  ? Special care was taken to maintain sense of open pharyngeal space for broadened resonance in " "conjunction with forward resonant principles  ? Use of inhalation to \"set the instrument\" for the remainder of the statement was facilitating.  ? Phrase level exercises featuring nasal continuants in more complex phonemic contexts were employed    Good accuracy with moderate support    Tendency towards lower than optimal pitch    Instruction of Home Practice:    A revised regimen for home practice was instructed.    I provided an AVS of important notes of today's therapeutic activities to facilitate practice.    ASSESSMENT/PLAN  Progress toward long-term goals:  Adequate but incomplete progress; please see above    Impressions:  IMPRESSIONS: Globus Sensation (R09.89), Throat Pain (R07.0) and Dysphonia (R49.0) in the context of Irritable Larynx Syndrome (ILS) (J38.7) and Laryngeal Hyperfunction (J38.7)     Josette had a productive session of therapy today, working on swallow recoordination and resonant voice progression to chanted conversation.  She will continue to work on her exercises on a daily basis, and work on incorporating the techniques into her daily activities. Speech therapy continues to be medically necessary for Josette to participate fully and engage in activities of daily living.     Plan:   I will see Josette in 3 weeks and will plan to add pitch variability to resonant voice tasks, potentially add rescue breathing to reduce throat tightness. Add session or discharge.   For practice goals, see AVS.     TOTAL SERVICE TIME: 50 minutes  TREATMENT (72804)  NO CHARGE FACILITY FEE    Emre Gonsalves (voice) M.S., CCC-SLP  Speech-Language Pathologist  [facility]  [phone]  [email or web address]  Pronouns: she/her/hers      *this report was created in part through the use of computerized dictation software, and though reviewed following completion, some typographic errors may persist.  If there is confusion regarding any of this notes contents, please contact me for clarification  "

## 2022-04-29 ENCOUNTER — THERAPY VISIT (OUTPATIENT)
Dept: SPEECH THERAPY | Facility: CLINIC | Age: 57
End: 2022-04-29
Attending: OTOLARYNGOLOGY
Payer: COMMERCIAL

## 2022-04-29 ENCOUNTER — ANCILLARY PROCEDURE (OUTPATIENT)
Dept: GENERAL RADIOLOGY | Facility: CLINIC | Age: 57
End: 2022-04-29
Attending: OTOLARYNGOLOGY
Payer: COMMERCIAL

## 2022-04-29 ENCOUNTER — DOCUMENTATION ONLY (OUTPATIENT)
Dept: OTOLARYNGOLOGY | Facility: CLINIC | Age: 57
End: 2022-04-29

## 2022-04-29 DIAGNOSIS — R13.10 DYSPHAGIA: ICD-10-CM

## 2022-04-29 DIAGNOSIS — R13.10 DYSPHAGIA, UNSPECIFIED TYPE: Primary | ICD-10-CM

## 2022-04-29 PROCEDURE — 74230 X-RAY XM SWLNG FUNCJ C+: CPT | Mod: GC | Performed by: RADIOLOGY

## 2022-04-29 RX ORDER — BARIUM SULFATE 400 MG/ML
50 SUSPENSION ORAL ONCE
Status: COMPLETED | OUTPATIENT
Start: 2022-04-29 | End: 2022-04-29

## 2022-04-29 RX ADMIN — BARIUM SULFATE 50 ML: 400 SUSPENSION ORAL at 10:34

## 2022-04-29 NOTE — PROGRESS NOTES
Speech-Language Pathology Department   EVALUATION  [facility]  Video Swallow Study Evaluation    04/29/22 1000   General Information   Type Of Visit Initial   Start Of Care Date 04/29/22   Referring Physician Dr. Daniela Rueda   Orders Evaluate And Treat   Orders Comment Video Swallow Study   Medical Diagnosis Dysphagia   Precautions/limitations No Known Precautions/limitations   Hearing Functional in 1:1 setting   Pertinent History of Current Problem/OT: Additional Occupational Profile Info Josette Gregg is a 57 year old female with a PMH significant for globus sensation. Reports this started with a sore throat in March 2022 with associated voice changes. She feels the sensation is more pronounced after eating and when laying down in the evening. Denies coughing/TC and feeling of stasis with PO intake. She is taking regular textures/thin liquids without difficulty. Denies odynophagia. She was seen by ENT on 3/29/22-please see documentation from that visit for further details. She presents today for video swallow study and esophagram to r/o dysphagia etiology contributing to globus. Reports voice therapy with Herminia Shaw, BOBO has helped her globus sensation a lot.   Respiratory Status Room air   Prior Level Of Function Swallowing   Prior Level Of Function Comment Regular textures/thin liquids   Patient Role/employment History Employed  (woriks at OR )   General Observations Pt highly pleasant and cooperative throughout evaluation   Patient/family Goals To figure out what is causing her globus sensation   Clinical Swallow Evaluation   Oral Musculature generally intact   Structural Abnormalities none present   Dentition present and adequate   Mucosal Quality good   Mandibular Strength and Mobility intact   Oral Labial Strength and Mobility WFL   Lingual Strength and Mobility WFL   Velar Elevation intact   Buccal Strength and Mobility intact   Laryngeal  Function Swallow;Voicing initiated;Dry swallow palpated   Oral Musculature Comments WFL   VFSS Evaluation   VFSS Additional Documentation Yes   VFSS Eval: Radiology   Radiologist Resident: Dr. Teague   Views Taken left lateral;A/P   Physical Location of Procedure [facility]   VFSS Eval: Thin Liquid Texture Trial   Mode of Presentation, Thin Liquid cup;self-fed   Order of Presentation Series 1,2,8,9,11  (9-tablet, 11-AP)   Preparatory Phase WFL   Oral Phase, Thin Liquid Premature pharyngeal entry   Pharyngeal Phase, Thin Liquid Residue in valleculae   Rosenbek's Penetration Aspiration Scale: Thin Liquid Trial Results 1 - no aspiration, contrast does not enter airway   Diagnostic Statement Intermittent premature spilage to valleculae. No penetration/aspiration. Minimal vallecular residuals. Barium tablet passed through oropharynx without difficulty. AP unremarakble.   VFSS Eval: Mildly Thick Liquids    Mode of Presentation cup;self-fed   Order of Presentation Series 3   Preparatory Phase WFL   Oral Phase Premature pharyngeal entry   Pharyngeal Phase Residue in valleculae   Rosenbek's Penetration Aspiration Scale 1 - no aspiration, contrast does not enter airway   Diagnostic Statement Premature spillage to piriforms. No penetration/aspiration. Minimal vallecular residuals.   VFSS Eval: Puree Solid Texture Trial   Mode of Presentation, Puree spoon;self-fed   Order of Presentation Series 4,5,10  (10-AP)   Preparatory Phase WFL   Oral Phase, Puree WFL   Pharyngeal Phase, Puree   (delay to valleculae)   Rosenbek's Penetration Aspiration Scale: Puree Food Trial Results 1 - no aspiration, contrast does not enter airway   Diagnostic Statement Delay to valleculae. No penetration/aspiration or significant residuals. AP unremarkable.   VFSS Eval: Regular Texture Trial (Solid)   Mode of Presentation self-fed   Order of Presentation Series 6,7   Preparatory Phase WFL   Oral Phase WFL   Pharyngeal Phase Delayed swallow  reflex  (delay to valleculae)   Rosenbek's Penetration Aspiration Scale 1 - no aspiration, contrast does not enter airway   Diagnostic Statement Delay to valleculae. No penetration/aspiration or significant residuals.   Swallow Compensations   Swallow Compensations No compensations were used   Educational Assessment   Barriers to Learning No barriers   Esophageal Phase of Swallow   Esophageal comments Esophagram completed as part of today's study; please see Radiologist's report for details   Swallow Eval: Clinical Impressions   Skilled Criteria for Therapy Intervention No problems identified which require skilled intervention   Dysphagia Outcome Severity Scale (FRANSICO) Level 7 - FRANSICO   Treatment Diagnosis Safe, functional oropharyngeal swallow   Diet texture recommendations Regular diet;Thin liquids (level 0)   Recommended Feeding/Eating Techniques alternate between small bites and sips of food/liquid;maintain upright posture during/after eating for 30 mins;small sips/bites   Demonstrates Need for Referral to Another Service other (see comments)  (continue work with voice SLP; GI consult pending results of esophagram)   Anticipated Discharge Disposition home   Risks and Benefits of Treatment have been explained. Yes   Patient, family and/or staff in agreement with Plan of Care Yes   Clinical Impression Comments Pt presents today with a safe, functional oropharyngeal swallow. Oral motor examination is WFL. Prompt swallow response with adequate airway protection. No penetration/aspiration. Minimal vallecular residuals with liquid trials. Delay to valleculae with puree and solid trials. Barium tablet passed through oropharynx without difficulty. AP unremarkable. Esophagram performed as part of today's study; please see Radiologist's report for details. At this time, there does not appear to be any oropharyngeal dysphagia contributing to globus sensation. Recommend pt continue with regular textures/thin liquids with use  of general safe swallow strategies. Trained pt re: anatomy/physiology of swallowing, results of today's study and recommendations. No further SLP services for swallowing are recommended at this time.   Total Session Time   SLP Eval: VideoFluoroscopic Swallow function Minutes (75738) 30   Total Evaluation Time 30     Thank you for the referral of Josette Gregg. If you have any questions about this report, please contact me using the information below.     MICHELLE Galarza MA (music), CCC-SLP   Speech Language Pathologist  [facility] Trained Vocologist   [facility] [place]  Dept. of Otolaryngology  Department of Rehabilitation Services  [address] [place] [id number]  Email: [email or web address]   Pronouns: she/her/hers

## 2022-04-29 NOTE — CONFIDENTIAL NOTE
Video Esophagram Review Rounds  Imaging Review of MBSS conducted with attending physician Daniela Rueda and reviewed/discussed with SLP Inna Horowitz, Paloma Vieira, Julia Dowling, and/or Jossy Steele    Relevant Background:    Esophagram: 4/29/22  Tiny hiatal hernia. Gastroesophageal reflux was observed. No other abnormal findings on the esophagram portion of the exam.     MBSS Date: 4/29/22    Findings:  Pharyngeal Weakness:No  Epiglottic dysfunction: No  Penetration: No  Aspiration: No  UES dysfunction: No  Details: safe swllow      Recommendations:  Diet: current  GI work up

## 2022-05-09 ENCOUNTER — TELEPHONE (OUTPATIENT)
Dept: GASTROENTEROLOGY | Facility: CLINIC | Age: 57
End: 2022-05-09
Payer: COMMERCIAL

## 2022-05-09 NOTE — TELEPHONE ENCOUNTER
CESIA and sent [facility] for scheduling appt with Dr Rust for video visit next available as recommended by Dr. Rueda.

## 2022-05-17 ENCOUNTER — VIRTUAL VISIT (OUTPATIENT)
Dept: OTOLARYNGOLOGY | Facility: CLINIC | Age: 57
End: 2022-05-17
Payer: COMMERCIAL

## 2022-05-17 DIAGNOSIS — R49.0 DYSPHONIA: Primary | ICD-10-CM

## 2022-05-17 PROCEDURE — 99207 PR NO CHARGE LOS: CPT | Performed by: SPEECH-LANGUAGE PATHOLOGIST

## 2022-05-17 NOTE — PROGRESS NOTES
Josette Gregg is a 57 year old female who is being evaluated via a billable video visit.      Josette has been notified and verbally consented to the following:     This video visit will be conducted between you and your provider.    Patient has opted to conduct today's video visit vs an in-person appointment.     Video visits are billed at different rates depending on your insurance coverage. Please reach out to your insurance provider with any questions.     If during the course of the call the provider feels the appointment is not appropriate, you will not be charged for this service.  Provider has received verbal consent for billable virtual visit from the patient? Yes  Will anyone else be joining your video visit? No    Call initiated at: 0900   Type of Visit Platform Used: [employer] Video  Location of provider: Home  Location of patient: [facility]  PROGRESS REPORT (CPT 95437)    Patient: Josette Gregg  Date of Service: 5/17/2022  Date of Last Service: 4/19/22  Number of Visits: 4  Referring Physician: Dr. Daniela Rueda and Jessica Hogue MD  Impressions from evaluation on 3/29/22 by Emre Gonsalves (voice), M.S., CCC-SLP:  Josette Gregg is presenting today with Globus Sensation (R09.89), Throat Pain (R07.0) and Dysphonia (R49.0) in the context of Irritable Larynx Syndrome (ILS) (J38.7) and Laryngeal Hyperfunction (J38.7). Laryngoscopy revealed moderate supraglottic hyperfunction and mild edema and erythema of the posterior cricoid region/arytenoids suggestive of LPR. She perceptually demonstrated mildly rough, strained, and breathy voice quality with non-optimal respiratory mechanics, and tenderness of the bilateral thyrohyoid space. She would benefit from a course of speech therapy targeting reduced laryngeal irritation (LPR), improved respiratory mechanics, decreased perilaryngeal hyperfunction, and phonatory techniques minimizing hyperfunction.     SUBJECTIVE:  Since Josette's last  session, she reports the following:   o Overall symptoms are pretty much resolved.   o She thinks that being conscious of breathing when she speaks to provide more support makes a difference.   o There are days when her throat discomfort doesn't bother her at all and some days that it bothers her a little bit.   o She feels confident in managing her symptoms on her own.     OBJECTIVE:  Patient Specific Goal Metrics:  Dysponia SLP Goals 3/31/2022 5/17/2022   How would you rate your speaking voice quality, if 0 is worst voice quality, and 10 is best voice? 8 10   How how severe is your [Throat Discomfort - or use patient specific description], if 0 is no [discomfort] at all and 10 is the worst [discomfort]? 7 2   How much does your throat discomfort bother you?     Somewhat A little bit       THERAPEUTIC ACTIVITIES    None needed    ASSESSMENT/PLAN  Progress toward long-term goals:  Good progress; please see report above for objective measures    Impressions:  IMPRESSIONS: Globus Sensation (R09.89), Throat Pain (R07.0) and Dysphonia (R49.0) in the context of Irritable Larynx Syndrome (ILS) (J38.7) and Laryngeal Hyperfunction (J38.7)     Josette reports her symptoms are largely resolved and she feels comfortable continuing independently with home exercises. She will contact our clinic should additional concerns arise.     Plan:   Discharge  For practice goals, see AVS.     TOTAL SERVICE TIME: 5 minutes  NO CHARGE FACILITY FEE    Emre Gonsalves (voice), M.S., CCC-SLP  Speech-Language Pathologist  [facility]  [phone]  [email or web address]  Pronouns: she/her/hers      *this report was created in part through the use of computerized dictation software, and though reviewed following completion, some typographic errors may persist.  If there is confusion regarding any of this notes contents, please contact me for clarification

## 2022-05-17 NOTE — PATIENT INSTRUCTIONS
"Hello!     It was a pleasure to see you today. Please complete the exercises below and remember, a few minutes of practice many times throughout the day is more important than one large practice session. If you have any questions about your strategies, feel free to contact me at [email or web address] or via [employer]. Please call [phone] for scheduling alterations.      Please remember to logon to [employer] a few minutes early to complete the questionnaires before your visit starts.      Home Exercise Program:  BELLY BREATHING (10 breaths AM/mid-day/PM)  SET-UP: Imagine stretching your arms toward the ceiling- but you can just stretch your shoulders in order to avoid raising your left arm. Feel a stretch through the sides of your ribcage and take two slow, deep breaths in and out. Next lean your shoulders toward each side, feeling your ribcage lift and expand along the opposite side and take two slow, deep breaths in and out on each side. Then arch your back to feel a stretch along the front of your ribcage and take two slow, deep breaths in and out. Then lean forward, relaxing over your knees, and allow your back and neck to stretch and take two slow, deep breaths in and out feeling your back stretch and expand.   PRACTICE:   Sit hinged forward with your elbows on your knees. OR Lay on your back OR Sit normally and place your hands on your LOW BELLY and small of your back.  Slowly breathe in and feel your belly and back expand, like filling a BIG BLUE BALLOON, pushing out against your waistband  Slowly breathe out and feel your belly and back crunch inward, like zipping tight pants  Repeat slowly and take breaks if you get dizzy  ____________________________________________________     SCM Release  Gently massage the muscle that starts at the bump behind your ear and curves around to the front of your throat, to the little \"twiggies\" at the bottom. You can gently knead with your thumb and the flat part of " "your pointer on each side. Do one side at a time.   Anterior Larynx Release  Place your fingers flat along the base of your throat, about an inch above the \"twiggies\". Gently apply pressure in a downward motion. If it feels helpful, you can use the thumb on your other hand to gently stretch upward under your chin, to create an opposite direction stretch. The intensity should not exceed 4/10.   Heat & Cold  You can apply heat if it feels helpful to get the muscles to relax, but always finish with a cold pack to reduce inflammation.     REFLUX MANAGEMENT (ongoing as needed)  -- When reflux comes up the wrong way from your stomach and spills into your throat, you may not feel it come up if the acidity levels are low. However, liquid spilling onto the vocal cords can cause lots of throat irritation, cough and throat clearing, hoarseness, and even difficulty breathing.   1. Elevate the head of your bed about 4 inches, trying to keep the mattress straight to avoid any back or neck aches.   2. Avoid eating/drinking close to when you lay down. The less there is in your stomach, the less can spill back up during the night.   3. Try to eat foods with a high PH level (less acidic). Possible reflux triggers include spicy foods, acidic foods, and foods that may relax the esophageal sphincters and allow back-splash, such as raw onions, garlic, or peppers, store-bought tomato sauces/salsas, pizza, greasy foods, orange or lemon juice, chocolate, caffeine, carbonation, alcohol, mints and menthol. Try to avoid these foods for about a month so your throat has time to heal and calm down, then reintroduce 1 food every 3-4 days, using a notebook to track any increase in symptoms to figure out which foods are your trigger foods.   4. Avoid putting pressure on your stomach by slouching, wearing tight clothes, or carrying extra weight that pushes on your stomach.   5. Actively reduce stress. Stress can cause a fight or flight response in " "your body, even if you feel like you're able to handle stress. Try deep breathing, yoga, or mindfulness meditation to help calm your nervous system (Calm, Headspace, Yoga with Kandace on youtube, etc). Square Breathing is an easy to use stress management tool: Inhale for 4 seconds, hold your breath for 4 seconds, exhale for 4 seconds, and hold your breath for 4 seconds. Repeat at least 4 times.      VOICE EXERCISES  -- Inhale SILENTLY and feel your belly fill with air  -- Slowly exhale as your belly contracts, ROUND \"OO\" LIPS  -- Hold your streamer about 6 inches from the end of your straw  -- Think of saying \"Whoooooo\" and watch for a smooth, gentle streamer movement, like a flag blowing in a breeze  3-5x silently with just air for 5-7 seconds  3-5x short and easy sighs on \"Whooo\"  3-5x foghorn on single pitch for 5-7 seconds  3-5x sliding lower for 5-7 seconds (\"ding dong\")  3-5x sliding higher for 5-7 seconds (\"here comes the bride\")  3-5x sliding up and down like sirens  Bonus: Easy songs/melodies with slurred phrases and no stopping streamer  -- Double-check that your inhale is a SILENT yawn and your LIPS are ROUNDED     BUZZY VOICE  Practice throughout the day, whenever you get a chance  1. 3x  whooommm  or \"hmmm\" for 5 seconds   Focus on \"nasal\" vibrations in your lips/nose on \"mmm\"  Create \"molar space\" to relax your jaw open slightly  2. Moomoomoo, moemoemoe, mememe, mamama, mymymy, maymaymay, mehmehmeh  3. Palafox, mom, mean, man, mine, moan, men, main, morning, meaning, manners, mammals  4. Sentences:  Mother knits many mittens. Dalton makes much money. Nobody knows Michell s name.            Allyssa s measles made Allyssa miserable. Milking machines make much noise.    Mild-mannered Chica  kristina Chinchilla. Edson marched many, many miles.  Millions made money in the market in March. My mother munches melon on Mondays.Maybe my memory needs mending. Abby made more money. Allyssa made many muffins. Merlin motored many " "zeke. Edson makes major messes. My momma made meatloaf. Music makes mellow moods.  5.  Mmmy favorite ... is ...  or \"nnnI like...\"   6. Reading out loud, starting after each breath with a \"hmm...\"  7. Conversation, narrating yourself, road signs, license plates, etc.   Chanted on 1 pitch for now, like a monk or robot. Double-check your abs crunching. Hands adding downward stretch along the base of your throat.      Swallow Coordination  1. Suction your tongue to the roof of your mouth with the tip just behind your top two teeth, hold for 20 seconds.   2. Practice collecting your saliva into the center of your tongue with the tongue cupped against the roof.   3. Make sure your tongue tip is in THE SPOT, jaw is closed, then swallow, pressing your tongue against the roof from tip to back.   4. Gradually move to sips of water with a straw, then spoonfuls (transfer from the spoon to the cupped tongue and glide the tongue out from between your lips), then small bites of solids. 10x/day     Thank you and have a great day!  Herminia"

## 2022-05-17 NOTE — LETTER
5/17/2022       RE: Josette Gregg  [address]  [facility] [place] [address]     Dear Colleague,    Thank you for referring your patient, Josette Gregg, to the [facility]ICE [facility] [place] at [facility]. Please see a copy of my visit note below.    Josette Gregg is a 57 year old female who is being evaluated via a billable video visit.      Josette has been notified and verbally consented to the following:     This video visit will be conducted between you and your provider.    Patient has opted to conduct today's video visit vs an in-person appointment.     Video visits are billed at different rates depending on your insurance coverage. Please reach out to your insurance provider with any questions.     If during the course of the call the provider feels the appointment is not appropriate, you will not be charged for this service.  Provider has received verbal consent for billable virtual visit from the patient? Yes  Will anyone else be joining your video visit? No    Call initiated at: 0900   Type of Visit Platform Used: Livemap Video  Location of provider: Home  Location of patient: [facility] VOICE CLINIC  PROGRESS REPORT (CPT 61333)    Patient: Josette Gregg  Date of Service: 5/17/2022  Date of Last Service: 4/19/22  Number of Visits: 4  Referring Physician: Dr. Daniela Rueda and Jessica Hogue MD  Impressions from evaluation on 3/29/22 by Emre Gonsalves (voice) MJomarSJomar, CCC-SLP:  Josette Gregg is presenting today with Globus Sensation (R09.89), Throat Pain (R07.0) and Dysphonia (R49.0) in the context of Irritable Larynx Syndrome (ILS) (J38.7) and Laryngeal Hyperfunction (J38.7). Laryngoscopy revealed moderate supraglottic hyperfunction and mild edema and erythema of the posterior cricoid region/arytenoids suggestive of LPR. She perceptually demonstrated mildly rough, strained, and breathy voice quality with non-optimal respiratory  mechanics, and tenderness of the bilateral thyrohyoid space. She would benefit from a course of speech therapy targeting reduced laryngeal irritation (LPR), improved respiratory mechanics, decreased perilaryngeal hyperfunction, and phonatory techniques minimizing hyperfunction.     SUBJECTIVE:  Since Josette's last session, she reports the following:   o Overall symptoms are pretty much resolved.   o She thinks that being conscious of breathing when she speaks to provide more support makes a difference.   o There are days when her throat discomfort doesn't bother her at all and some days that it bothers her a little bit.   o She feels confident in managing her symptoms on her own.     OBJECTIVE:  Patient Specific Goal Metrics:  Dysponia SLP Goals 3/31/2022 5/17/2022   How would you rate your speaking voice quality, if 0 is worst voice quality, and 10 is best voice? 8 10   How how severe is your [Throat Discomfort - or use patient specific description], if 0 is no [discomfort] at all and 10 is the worst [discomfort]? 7 2   How much does your throat discomfort bother you?     Somewhat A little bit       THERAPEUTIC ACTIVITIES    None needed    ASSESSMENT/PLAN  Progress toward long-term goals:  Good progress; please see report above for objective measures    Impressions:  IMPRESSIONS: Globus Sensation (R09.89), Throat Pain (R07.0) and Dysphonia (R49.0) in the context of Irritable Larynx Syndrome (ILS) (J38.7) and Laryngeal Hyperfunction (J38.7)     Josette reports her symptoms are largely resolved and she feels comfortable continuing independently with home exercises. She will contact our clinic should additional concerns arise.     Plan:   Discharge  For practice goals, see AVS.     TOTAL SERVICE TIME: 5 minutes  NO CHARGE FACILITY FEE    Emre Gonsalves (voice)[email or web address], CCC-SLP  Speech-Language Pathologist  [facility] Voice [facility]  [phone]  [email or web address]  Pronouns: she/her/hers      *this report was  created in part through the use of computerized dictation software, and though reviewed following completion, some typographic errors may persist.  If there is confusion regarding any of this notes contents, please contact me for clarification      Again, thank you for allowing me to participate in the care of your patient.      Sincerely,    Herminia Shaw, SLP

## 2022-06-14 ENCOUNTER — PRE VISIT (OUTPATIENT)
Dept: OTOLARYNGOLOGY | Facility: CLINIC | Age: 57
End: 2022-06-14

## 2022-06-24 ENCOUNTER — TELEPHONE (OUTPATIENT)
Dept: ORTHOPEDICS | Facility: CLINIC | Age: 57
End: 2022-06-24

## 2022-06-24 DIAGNOSIS — M16.11 PRIMARY OSTEOARTHRITIS OF RIGHT HIP: Primary | ICD-10-CM

## 2022-06-24 NOTE — TELEPHONE ENCOUNTER
Writer called and left a voice message to pt stating that Dr. Marcano and his team are not here on site today. Writer will forward her request on they will reach out to pt once they confirm if provider is willing to place another injection order.    Kathryn Morales LPN

## 2022-06-24 NOTE — TELEPHONE ENCOUNTER
Procedure: RTHA  Facility: [facility] [place] [facility]  Length: 120 minutes  Anesthesia: Choice  Post-op appointments needed: 2 weeks nurse only, 6 weeks with provider only.  Surgery packet/instructions given to patient?  No    Case request was placed on 1/27/22.  Ijeoma Ramsey RN

## 2022-06-24 NOTE — TELEPHONE ENCOUNTER
[facility] Call Center    Phone Message    May a detailed message be left on voicemail: no     Reason for Call: Other: Patient sent MyChart msg: I would like to get approval to have another XR Joint Injection Major with local and steroid in my right hip.  1st injection was on 2-22-22.   I don't neccesarily need an appointment, but only for him to write the order if he approves.  Thank you.     Action Taken: Message routed to:  [facility] ([facility]    Travel Screening: Not Applicable

## 2022-06-24 NOTE — TELEPHONE ENCOUNTER
LOV 1/27/22. Plan for steroid injection or total hip when patient would like.  Injection done on 2/22/22  Order for another injection placed. Called patient to let her know.  Ijeoma Ramsey RN

## 2022-06-27 ENCOUNTER — DOCUMENTATION ONLY (OUTPATIENT)
Dept: ORTHOPEDICS | Facility: CLINIC | Age: 57
End: 2022-06-27

## 2022-06-27 PROBLEM — M16.31 OSTEOARTHRITIS OF RIGHT HIP JOINT DUE TO DYSPLASIA: Status: ACTIVE | Noted: 2022-06-27

## 2022-06-27 NOTE — PROGRESS NOTES
Patient is scheduled for surgery with Dr. Marcano    Spoke with: Josette     Date of Surgery: 1/23/2023    Location: ASC    Informed patient they will need an adult  Yes    Pre op with Provider PCP    H&P: Scheduled with PCP Patient will complete    Pre-procedure COVID-19 Test: Patient will complete    Additional imaging/appointments: POP Made    Surgery packet: Received in clinic     Additional comments: N/A

## 2022-07-08 ENCOUNTER — ANCILLARY PROCEDURE (OUTPATIENT)
Dept: GENERAL RADIOLOGY | Facility: CLINIC | Age: 57
End: 2022-07-08
Attending: ORTHOPAEDIC SURGERY
Payer: COMMERCIAL

## 2022-07-08 DIAGNOSIS — M16.11 PRIMARY OSTEOARTHRITIS OF RIGHT HIP: ICD-10-CM

## 2022-07-08 PROCEDURE — 77002 NEEDLE LOCALIZATION BY XRAY: CPT | Mod: GC | Performed by: RADIOLOGY

## 2022-07-08 PROCEDURE — 20610 DRAIN/INJ JOINT/BURSA W/O US: CPT | Mod: RT | Performed by: RADIOLOGY

## 2022-07-08 RX ORDER — TRIAMCINOLONE ACETONIDE 40 MG/ML
40 INJECTION, SUSPENSION INTRA-ARTICULAR; INTRAMUSCULAR ONCE
Status: COMPLETED | OUTPATIENT
Start: 2022-07-08 | End: 2022-07-08

## 2022-07-08 RX ORDER — IOPAMIDOL 408 MG/ML
10 INJECTION, SOLUTION INTRATHECAL ONCE
Status: COMPLETED | OUTPATIENT
Start: 2022-07-08 | End: 2022-07-08

## 2022-07-08 RX ORDER — LIDOCAINE HYDROCHLORIDE 10 MG/ML
5 INJECTION, SOLUTION EPIDURAL; INFILTRATION; INTRACAUDAL; PERINEURAL ONCE
Status: COMPLETED | OUTPATIENT
Start: 2022-07-08 | End: 2022-07-08

## 2022-07-08 RX ORDER — BUPIVACAINE HYDROCHLORIDE 2.5 MG/ML
10 INJECTION, SOLUTION INFILTRATION; PERINEURAL ONCE
Status: COMPLETED | OUTPATIENT
Start: 2022-07-08 | End: 2022-07-08

## 2022-07-08 RX ADMIN — IOPAMIDOL 4 ML: 408 INJECTION, SOLUTION INTRATHECAL at 11:15

## 2022-07-08 RX ADMIN — TRIAMCINOLONE ACETONIDE 40 MG: 40 INJECTION, SUSPENSION INTRA-ARTICULAR; INTRAMUSCULAR at 11:15

## 2022-07-08 RX ADMIN — BUPIVACAINE HYDROCHLORIDE 5 MG: 2.5 INJECTION, SOLUTION INFILTRATION; PERINEURAL at 11:15

## 2022-07-08 RX ADMIN — LIDOCAINE HYDROCHLORIDE 4 ML: 10 INJECTION, SOLUTION EPIDURAL; INFILTRATION; INTRACAUDAL; PERINEURAL at 11:13

## 2022-07-22 ENCOUNTER — TELEPHONE (OUTPATIENT)
Dept: ORTHOPEDICS | Facility: CLINIC | Age: 57
End: 2022-07-22

## 2022-07-22 NOTE — TELEPHONE ENCOUNTER
Phoned patient to rescheduled surgery, due to her insurance changing with the potential that RAP may not be covered at the time of her surgery.     Explained to patient that her surgery may be cancelled if her new insurance does not cover RAP.    Patient decided to keep her surgery date and will wait for the approval or denial per insurance.    Patient stated that she may call back if she does decide to reschedule to December.       Will route encounter to PHIL Raphael.

## 2022-10-17 ENCOUNTER — TELEPHONE (OUTPATIENT)
Dept: ORTHOPEDICS | Facility: CLINIC | Age: 57
End: 2022-10-17

## 2022-10-28 ENCOUNTER — TELEPHONE (OUTPATIENT)
Dept: ORTHOPEDICS | Facility: CLINIC | Age: 57
End: 2022-10-28

## 2022-10-28 NOTE — TELEPHONE ENCOUNTER
Surgery rescheduled at patient's request to a sooner surgery date due to insurance change.     Date Scheduled: 11/28/22  Facility: [facility] [place]  Surgeon: Dr. Marcano   Post-op appointment scheduled:    scheduled?: No  Surgery packet/instructions confirmed received?  Yes  Special Considerations:   Rossi Romero, Surgery Scheduling Coordinator

## 2022-10-28 NOTE — OR NURSING
Spoke to pt about recovery advantage program and discharge to [facility] post op, pt wanting to move up her surgery due to insurance changes, asking appropriate questions and waiting for PT to call her, PAC set up for 11/2/22.

## 2022-10-31 NOTE — TELEPHONE ENCOUNTER
FUTURE VISIT INFORMATION      SURGERY INFORMATION:    Date: 2022    Location:[facility] [place] [facility] OR    Surgeon:  Jimbo Marcano MD    Anesthesia Type:  Choice    Procedure: ARTHROPLASTY, HIP, TOTAL RIGHT    Consult: 22    RECORDS REQUESTED FROM:       Primary Care Provider: Jessica Hogue MD - [facility]    Pertinent Medical History: Anemia     Most recent EKG+ Tracin/12/15 - EPIC

## 2022-11-01 NOTE — PATIENT INSTRUCTIONS
Preparing for Your Surgery      Name:  Josette Gregg   MRN:  [id number]   :  1965   Today's Date:  2022       Arriving for surgery:  Surgery date:  22  Arrival time:  05:45 am     Surgeries and procedures: Adult patients can have 2 visitors all through the surgery process.     Visiting hours: 8 a.m. to 8:30 p.m.     Hospital: Adult patients and children under age 18 can have 4 visitor at a time     No visitors under the age of 5 are allowed for hospital patients.  Double occupancy rooms: Patients can have only two visitors at a time.     Patients with disabilities: Can have a support person with them (family member, service provider     Or someone well informed about their needs) plus the allowed number of visitors     Patients confirmed or suspected to have symptoms of COVID 19 or flu:     No visitors allowed for adult patients.   Children (under age 18) can have 1 named visitor.     People who are sick or showing symptoms of COVID 19 or flu:    Are not allowed to visit patients--we can only make exceptions in special situations.       Please follow these guidelines for your visit:   Arrive wearing a mask over your mouth and nose; we will give you a medical mask to wear    If you arrive wearing a cloth mask.   Keep it on during your entire visit, even when in patient's room.   If you don't wear a mask we'll ask you to leave.     Clean your hands with alcohol hand . Do this when you arrive at and leave the building and patient room,    And again after you touch your mask or anything in the room.     You can t visit if you have a fever, cough, shortness of breath, muscle aches, headaches, sore throat    Or diarrhea      Stay 6 feet away from others during your visit and between visits     Go directly to and from the room you are visiting.     Stay in the patient s room during your visit. Limit going to other places in the hospital as much as possible     Leave bags and jackets at home or  in the car.     For everyone s health, please don t come and go during your visit. That includes for smoking   during your visit.     Please come to:   [facility] - [address] [address]   Parking is available in front of the Clinics and Surgery Center building from 5:30AM to 8:00PM.  -  Proceed to the 5th floor to check into the Ambulatory Surgery Center.              >> There will be patient concierges on the 1st and 5th floor, for assistance or an escort, if you would like.              >> Please call [phone] with any questions.    What can I eat or drink?  -  You may eat and drink normally up to 8 hours prior to arrival time.   -  You may have clear liquids until 4 hours prior to arrival time.     Examples of clear liquids:  Water  Clear broth  Juices (apple, white grape, white cranberry  and cider) without pulp  Noncarbonated, powder based beverages  (lemonade and Brandon-Aid)  Sodas (Sprite, 7-Up, ginger ale and seltzer)  Coffee or tea (without milk or cream)  Gatorade    -  No Alcohol for at least 24 hours before surgery.     Which medicines can I take?  Hold Aspirin for 7 days before surgery.   Hold Multivitamins for 7 days before surgery.  Hold Supplements for 7 days before surgery.  Hold Ibuprofen (Advil, Motrin) for 1 day before surgery--unless otherwise directed by surgeon.  Hold Naproxen (Aleve) for 4 days before surgery.  -  PLEASE TAKE these medications the day of surgery:  Tylenol if needed.    How do I prepare myself?  - Please take 2 showers before surgery using Scrubcare or Hibiclens soap.    Use this soap only from the neck to your toes.     Leave the soap on your skin for one minute--then rinse thoroughly.      You may use your own shampoo and conditioner. No other hair products.   - Please remove all jewelry and body piercings.  - No lotions, deodorants or fragrance.  - No makeup or fingernail polish.   - Bring your  ID and insurance card.    -If you have a Deep Brain Stimulator, Spinal Cord Stimulator, or any Neuro Stimulator device---you must bring the remote control to the hospital.      ALL PATIENTS GOING HOME THE SAME DAY OF SURGERY ARE REQUIRED TO HAVE A RESPONSIBLE ADULT TO DRIVE AND BE IN ATTENDANCE WITH THEM FOR 24 HOURS FOLLOWING SURGERY.      Questions or Concerns:    - For any questions regarding the day of surgery or your hospital stay, please contact the Pre Admission Nursing Office at [phone].       - If you have health changes between today and your surgery, please call your surgeon.       - For questions after surgery, please call your surgeons office.

## 2022-11-02 ENCOUNTER — ANESTHESIA EVENT (OUTPATIENT)
Dept: SURGERY | Facility: CLINIC | Age: 57
End: 2022-11-02

## 2022-11-02 ENCOUNTER — TELEPHONE (OUTPATIENT)
Dept: ORTHOPEDICS | Facility: CLINIC | Age: 57
End: 2022-11-02

## 2022-11-02 ENCOUNTER — VIRTUAL VISIT (OUTPATIENT)
Dept: SURGERY | Facility: CLINIC | Age: 57
End: 2022-11-02
Payer: COMMERCIAL

## 2022-11-02 ENCOUNTER — PRE VISIT (OUTPATIENT)
Dept: SURGERY | Facility: CLINIC | Age: 57
End: 2022-11-02

## 2022-11-02 DIAGNOSIS — Z01.818 PREOP EXAMINATION: Primary | ICD-10-CM

## 2022-11-02 DIAGNOSIS — M16.31 OSTEOARTHRITIS OF RIGHT HIP JOINT DUE TO DYSPLASIA: ICD-10-CM

## 2022-11-02 PROCEDURE — 99203 OFFICE O/P NEW LOW 30 MIN: CPT | Mod: 95 | Performed by: PHYSICIAN ASSISTANT

## 2022-11-02 RX ORDER — OMEGA-3 FATTY ACIDS/FISH OIL 300-1000MG
200 CAPSULE ORAL EVERY 4 HOURS PRN
COMMUNITY
End: 2022-11-28

## 2022-11-02 RX ORDER — SIMETHICONE 125 MG
125 TABLET,CHEWABLE ORAL PRN
COMMUNITY

## 2022-11-02 ASSESSMENT — PAIN SCALES - GENERAL: PAINLEVEL: NO PAIN (0)

## 2022-11-02 ASSESSMENT — LIFESTYLE VARIABLES: TOBACCO_USE: 0

## 2022-11-02 ASSESSMENT — ENCOUNTER SYMPTOMS: SEIZURES: 0

## 2022-11-02 NOTE — TELEPHONE ENCOUNTER
Health Call Center    Phone Message    May a detailed message be left on voicemail: no     Reason for Call: Other: Patient wondering what to do with her paper work. She was told to call a [employer], but the msg says she no longer works at [employer]. Please c/b to let her know what to do    Action Taken: Message routed to:  [facility] ([facility]    Travel Screening: Not Applicable

## 2022-11-02 NOTE — PROGRESS NOTES
Josette is a 57 year old who is being evaluated via a billable video visit.      How would you like to obtain your AVS? MyChart      HPI         Review of Systems         Objective    Vitals - Patient Reported  Pain Score: No Pain (0)        Physical Exam       ANN Talavera LPN

## 2022-11-02 NOTE — TELEPHONE ENCOUNTER
Spoke with the patient and let her know where she can send her paperwork. She is driving so she requested that I email her the information. I told her I would send it to her in a [employer] message. She thanked me for the call.  Sharri Ledezma ATC

## 2022-11-02 NOTE — H&P
Pre-Operative H & P     CC:  Preoperative exam to assess for increased cardiopulmonary risk while undergoing surgery and anesthesia.    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     Ms. 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 She has been experiencing increasingly problematic symptoms for the 1-2 years. She is s/p left hip arthroplasty in 2015. She now presents for the above procedure.    PMH is also significant for breech birth, allergic rhinitis and GERD.    History was obtained from patient & chart review.     Hx of abnormal bleeding or anti-platelet use: denies    Menstrual history: No LMP recorded. Patient has had a hysterectomy.:       Past Medical History  Past Medical History:   Diagnosis Date     Congenital hip dysplasia     breech, treated     GERD (gastroesophageal reflux disease)      Left shoulder pain 07/16/2012     Osteoarthritis of hip        Past Surgical History  Past Surgical History:   Procedure Laterality Date     ARTHROPLASTY MINIMALLY INVASIVE HIP Left 2/16/2015    Procedure: ARTHROPLASTY MINIMALLY INVASIVE HIP;  Surgeon: Edson Oviedo MD;  [facility]: [facility]     ARTHROSCOPY SHOULDER, OPEN BICEP TENODESIS REPAIR, COMBINED  4/9/2014    Procedure: COMBINED ARTHROSCOPY SHOULDER, OPEN BICEP TENODESIS REPAIR;;  Surgeon: Josh Love MD;  [facility]     ARTHROSCOPY SHOULDER, OPEN ROTATOR CUFF REPAIR, COMBINED  4/9/2014    Procedure: COMBINED ARTHROSCOPY SHOULDER, OPEN ROTATOR CUFF REPAIR;  Diagnostic Left SHOULDER ARTHROSCOPY,  OPEN ROTATOR CUFF  REPAIR, Biceps Tenodesis;  Surgeon: Josh Love MD;  Location: [facility]     GYN SURGERY       HYSTERECTOMY  1/2008     ORTHOPEDIC SURGERY       US JOINT INJECTION ASPIRATION MAJOR RIGHT  11/3/2010    left shoulder injection Jagdish see scan       Prior to Admission Medications  Current Outpatient Medications   Medication Sig Dispense Refill     ibuprofen (ADVIL/MOTRIN) 200 MG capsule Take 200 mg by mouth every 4 hours as needed for fever       Multiple Vitamin (MULTIVITAMINS PO) Take 1 tablet by mouth every morning       Probiotic Product (PROBIOTIC-10 PO) Take by mouth every morning       simethicone (MYLICON) 125 MG chewable tablet Take 125 mg by mouth as needed for intestinal gas         Allergies  No Known Allergies    Social History  Social History     Socioeconomic History     Marital status:      Spouse name: Not on file     Number of children: 1     Years of education: Not on file     Highest education level: Not on file   Occupational History     Not on file   Tobacco Use     Smoking status: Never     Smokeless tobacco: Never   Substance and Sexual Activity     Alcohol use: Yes     Comment: occasional     Drug use: No     Sexual activity: Yes     Partners: Male     Birth control/protection: Surgical   Other Topics Concern     Parent/sibling w/ CABG, MI or angioplasty before 65F 55M? No   Social History Narrative     Not on file     Social Determinants of Health     Financial Resource Strain: Not on file   Food Insecurity: Not on file   Transportation Needs: Not on file   Physical Activity: Not on file   Stress: Not on file   Social Connections: Not on file   Intimate Partner Violence: Not on file   Housing Stability: Not on file       Family History  Family History   Problem Relation Age of Onset     Rheumatoid Arthritis Mother      Melanoma No family hx of      Skin Cancer No family hx of      Anesthesia Reaction No family hx of      Deep Vein Thrombosis (DVT) No family hx of       Cardiovascular No family hx of        Review of Systems  The complete review of systems is negative other than noted in the HPI or here.     Anesthesia Evaluation   Pt has had prior anesthetic.     No history of anesthetic complications       ROS/MED HX  ENT/Pulmonary:  - neg pulmonary ROS  (-) tobacco use, asthma and sleep apnea   Neurologic:  - neg neurologic ROS  (-) no seizures and no CVA   Cardiovascular:  - neg cardiovascular ROS     METS/Exercise Tolerance: 3 - Able to walk 1-2 blocks without stopping Comment: Activity limited due to hip pain. Able to ride bicycle 7 miles. Only able to walk 2 blocks.       Hematologic: Comments: Transfused following hip arthroplasty in 2015 due to postop blood loss    (+) history of blood transfusion, no previous transfusion reaction,  (-) history of blood clots   Musculoskeletal: Comment: Congenital hip dysplasia  S/p left hip arthroplasty 2015  S/p left rotator cuff repair 2014        GI/Hepatic:    (-) GERD and liver disease   Renal/Genitourinary:  - neg Renal ROS  (-) renal disease   Endo: Comment: Received 2 hip steroid injections over past year (most recently in May).     (-) Type II DM   Psychiatric/Substance Use:  - neg psychiatric ROS     Infectious Disease: Comment: Wound infection following hysterectomy.        Malignancy:  - neg malignancy ROS     Other:  - neg other ROS          Virtual visit -  No vitals were obtained    Physical Exam  Constitutional: Awake, alert, cooperative, no apparent distress, and appears stated age.  HENT: Normocephalic  Respiratory: non labored breathing   Neurologic: Awake, alert, oriented to name, place and time.   Neuropsychiatric: Calm, cooperative. Pleasant. Normal affect.      Prior Labs/Diagnostic Studies   All labs and imaging personally reviewed     XR PELVIS AND HIP RIGHT 1 VIEW  1/27/2022  IMPRESSION:   1. Mild to moderate right hip degenerative change.  2. Total left hip arthroplasty.      MR right hip without contrast  1/18/2022  Impression:  1. Extensive acetabular labral tearing. Cartilage is relatively  preserved and no associated subchondral edema-like marrow signal  intensities.  2. Finding compatible with provided history of congenial hip  dysplasia.  3. Question T2 hyperintense focus within the distal most coccygeal  segment. Finding may be representing entity such as Benign Notochordal  Cell Tumors. If clinically indicated, dedicated sacrum/coccyx MRI may  be helpful as current study only partially include this finding.      The patient's records and results personally reviewed by this provider.     Patient states that she will complete a home COVID test for above procedure.    LABS ordered today:  BMP, CBC    Assessment      Josette Gregg is a 57 year old female seen as a PAC referral for risk assessment and optimization for anesthesia.    Plan/Recommendations  Pt will be optimized for the proposed procedure.  See below for details on the assessment, risk, and preoperative recommendations    NEUROLOGY  - No history of TIA, CVA or seizure    -Post Op delirium risk factors:  No risk identified    ENT  - No current airway concerns.  Will need to be reassessed day of surgery.  Mallampati: Unable to assess  TM: Unable to assess    CARDIAC  - No history of CAD, Hypertension and Afib  - METS (Metabolic Equivalents)  Patient performs 4 or more METS exercise without symptoms            Total Score: -        Criteria with incomplete data:    Functional Capacity: Unable to complete 4 METS      - This score is not calculated because of inadequate data     RCRI-Very low risk: Class 1 0.4% complication rate            Total Score: 0        PULMONARY  SUZE Low Risk            Total Score: 1    SUZE: Over 50 ys old      - Denies asthma or inhaler use  - Tobacco History      History   Smoking Status     Never   Smokeless Tobacco     Never       GI  - Denies GERD  PONV High Risk  Total Score: 3           1 AN PONV: Pt is Female    1 AN  "PONV: Patient is not a current smoker    1 AN PONV: Intended Post Op Opioids          ENDOCRINE    - BMI: Estimated body mass index is 23.04 kg/m  as calculated from the following:    Height as of 3/29/22: 1.588 m (5' 2.5\").    Weight as of 3/29/22: 58.1 kg (128 lb).  Healthy Weight (BMI 18.5-24.9)  - No history of Diabetes Mellitus    HEME  VTE Low Risk 0.26%            Total Score: 0      - No history of abnormal bleeding or antiplatelet use.      MSK  Patient is NOT Frail            Total Score: 0      - Congenital hip dysplasia  - S/p left hip arthroplasty 2015  - S/p left rotator cuff repair 2014      The patient is optimized for their procedure. AVS with information on surgery time/arrival time, meds and NPO status given by nursing staff. No further diagnostic testing indicated.    Please refer to the physical examination documented by the anesthesiologist in the anesthesia record on the day of surgery.    Final plan per anesthesiologist on day of surgery.    Video-Visit Details    Type of service:  Video Visit    Provider received verbal consent for a Video Visit from the patient? Yes    Video Start Time: 0828  Video End Time (time video stopped): 0845    Originating Location (pt. Location): Home    Distant Location (provider location): Off-site    Mode of Communication:  Video Conference via [employer]  On the day of service:     Prep time: 14 minutes  Visit time: 17 minutes  Documentation time: 12 minutes  ------------------------------------------  Total time: 43 minutes      Ailyn Avelar PA-C  Preoperative Assessment Center  [facility]  Clinic and Surgery Center  Phone: [phone]  Fax: [phone]  "

## 2022-11-02 NOTE — H&P (VIEW-ONLY)
Pre-Operative H & P     CC:  Preoperative exam to assess for increased cardiopulmonary risk while undergoing surgery and anesthesia.    Date of Encounter: 11/2/2022  Primary Care Physician:  Jessica Hogue     Reason for Visit: Osteoarthritis of right hip joint due to dysplasia    HPI     Josette Gregg is a 58 y/o female who presents for pre-operative H&P in preparation for ARTHROPLASTY, HIP, TOTAL RIGHT with Jimbo Marcano MD on 11/28/22 at [facility] for treatment of Osteoarthritis of right hip joint due to dysplasia.     Ms. 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 She has been experiencing increasingly problematic symptoms for the 1-2 years. She is s/p left hip arthroplasty in 2015. She now presents for the above procedure.    PMH is also significant for breech birth, allergic rhinitis and GERD.    History was obtained from patient & chart review.     Hx of abnormal bleeding or anti-platelet use: denies    Menstrual history: No LMP recorded. Patient has had a hysterectomy.:       Past Medical History  Past Medical History:   Diagnosis Date     Congenital hip dysplasia     breech, treated     GERD (gastroesophageal reflux disease)      Left shoulder pain 07/16/2012     Osteoarthritis of hip        Past Surgical History  Past Surgical History:   Procedure Laterality Date     ARTHROPLASTY MINIMALLY INVASIVE HIP Left 2/16/2015    Procedure: ARTHROPLASTY MINIMALLY INVASIVE HIP;  Surgeon: Edson Oviedo MD;  [facility]     ARTHROSCOPY SHOULDER, OPEN BICEP TENODESIS REPAIR, COMBINED  4/9/2014    Procedure: COMBINED ARTHROSCOPY SHOULDER, OPEN BICEP TENODESIS REPAIR;;  Surgeon: Josh Love MD;  [facility]     ARTHROSCOPY SHOULDER, OPEN ROTATOR CUFF REPAIR, COMBINED  4/9/2014    Procedure: COMBINED ARTHROSCOPY SHOULDER, OPEN ROTATOR CUFF REPAIR;  Diagnostic Left SHOULDER ARTHROSCOPY,  OPEN ROTATOR CUFF  REPAIR, Biceps Tenodesis;  Surgeon: Josh Love MD;  Location: [facility]     GYN SURGERY       HYSTERECTOMY  1/2008     ORTHOPEDIC SURGERY       US JOINT INJECTION ASPIRATION MAJOR RIGHT  11/3/2010    left shoulder injection Jagdish see scan       Prior to Admission Medications  Current Outpatient Medications   Medication Sig Dispense Refill     ibuprofen (ADVIL/MOTRIN) 200 MG capsule Take 200 mg by mouth every 4 hours as needed for fever       Multiple Vitamin (MULTIVITAMINS PO) Take 1 tablet by mouth every morning       Probiotic Product (PROBIOTIC-10 PO) Take by mouth every morning       simethicone (MYLICON) 125 MG chewable tablet Take 125 mg by mouth as needed for intestinal gas         Allergies  No Known Allergies    Social History  Social History     Socioeconomic History     Marital status:      Spouse name: Not on file     Number of children: 1     Years of education: Not on file     Highest education level: Not on file   Occupational History     Not on file   Tobacco Use     Smoking status: Never     Smokeless tobacco: Never   Substance and Sexual Activity     Alcohol use: Yes     Comment: occasional     Drug use: No     Sexual activity: Yes     Partners: Male     Birth control/protection: Surgical   Other Topics Concern     Parent/sibling w/ CABG, MI or angioplasty before 65F 55M? No   Social History Narrative     Not on file     Social Determinants of Health     Financial Resource Strain: Not on file   Food Insecurity: Not on file   Transportation Needs: Not on file   Physical Activity: Not on file   Stress: Not on file   Social Connections: Not on file   Intimate Partner Violence: Not on file   Housing Stability: Not on file       Family History  Family History   Problem Relation Age of Onset     Rheumatoid Arthritis Mother      Melanoma No family hx of      Skin Cancer No family hx of      Anesthesia Reaction No family hx of      Deep Vein Thrombosis (DVT) No family hx of       Cardiovascular No family hx of        Review of Systems  The complete review of systems is negative other than noted in the HPI or here.     Anesthesia Evaluation   Pt has had prior anesthetic.     No history of anesthetic complications       ROS/MED HX  ENT/Pulmonary:  - neg pulmonary ROS  (-) tobacco use, asthma and sleep apnea   Neurologic:  - neg neurologic ROS  (-) no seizures and no CVA   Cardiovascular:  - neg cardiovascular ROS     METS/Exercise Tolerance: 3 - Able to walk 1-2 blocks without stopping Comment: Activity limited due to hip pain. Able to ride bicycle 7 miles. Only able to walk 2 blocks.       Hematologic: Comments: Transfused following hip arthroplasty in 2015 due to postop blood loss    (+) history of blood transfusion, no previous transfusion reaction,  (-) history of blood clots   Musculoskeletal: Comment: Congenital hip dysplasia  S/p left hip arthroplasty 2015  S/p left rotator cuff repair 2014        GI/Hepatic:    (-) GERD and liver disease   Renal/Genitourinary:  - neg Renal ROS  (-) renal disease   Endo: Comment: Received 2 hip steroid injections over past year (most recently in May).     (-) Type II DM   Psychiatric/Substance Use:  - neg psychiatric ROS     Infectious Disease: Comment: Wound infection following hysterectomy.        Malignancy:  - neg malignancy ROS     Other:  - neg other ROS          Virtual visit -  No vitals were obtained    Physical Exam  Constitutional: Awake, alert, cooperative, no apparent distress, and appears stated age.  HENT: Normocephalic  Respiratory: non labored breathing   Neurologic: Awake, alert, oriented to name, place and time.   Neuropsychiatric: Calm, cooperative. Pleasant. Normal affect.      Prior Labs/Diagnostic Studies   All labs and imaging personally reviewed     XR PELVIS AND HIP RIGHT 1 VIEW  1/27/2022  IMPRESSION:   1. Mild to moderate right hip degenerative change.  2. Total left hip arthroplasty.      MR right hip without contrast  1/18/2022  Impression:  1. Extensive acetabular labral tearing. Cartilage is relatively  preserved and no associated subchondral edema-like marrow signal  intensities.  2. Finding compatible with provided history of congenial hip  dysplasia.  3. Question T2 hyperintense focus within the distal most coccygeal  segment. Finding may be representing entity such as Benign Notochordal  Cell Tumors. If clinically indicated, dedicated sacrum/coccyx MRI may  be helpful as current study only partially include this finding.      The patient's records and results personally reviewed by this provider.     Patient states that she will complete a home COVID test for above procedure.    LABS ordered today:  BMP, CBC    Assessment      Josette Gregg is a 57 year old female seen as a PAC referral for risk assessment and optimization for anesthesia.    Plan/Recommendations  Pt will be optimized for the proposed procedure.  See below for details on the assessment, risk, and preoperative recommendations    NEUROLOGY  - No history of TIA, CVA or seizure    -Post Op delirium risk factors:  No risk identified    ENT  - No current airway concerns.  Will need to be reassessed day of surgery.  Mallampati: Unable to assess  TM: Unable to assess    CARDIAC  - No history of CAD, Hypertension and Afib  - METS (Metabolic Equivalents)  Patient performs 4 or more METS exercise without symptoms            Total Score: -        Criteria with incomplete data:    Functional Capacity: Unable to complete 4 METS      - This score is not calculated because of inadequate data     RCRI-Very low risk: Class 1 0.4% complication rate            Total Score: 0        PULMONARY  SUZE Low Risk            Total Score: 1    SUZE: Over 50 ys old      - Denies asthma or inhaler use  - Tobacco History      History   Smoking Status     Never   Smokeless Tobacco     Never       GI  - Denies GERD  PONV High Risk  Total Score: 3           1 AN PONV: Pt is Female    1 AN  "PONV: Patient is not a current smoker    1 AN PONV: Intended Post Op Opioids          ENDOCRINE    - BMI: Estimated body mass index is 23.04 kg/m  as calculated from the following:    Height as of 3/29/22: 1.588 m (5' 2.5\").    Weight as of 3/29/22: 58.1 kg (128 lb).  Healthy Weight (BMI 18.5-24.9)  - No history of Diabetes Mellitus    HEME  VTE Low Risk 0.26%            Total Score: 0      - No history of abnormal bleeding or antiplatelet use.      MSK  Patient is NOT Frail            Total Score: 0      - Congenital hip dysplasia  - S/p left hip arthroplasty 2015  - S/p left rotator cuff repair 2014      The patient is optimized for their procedure. AVS with information on surgery time/arrival time, meds and NPO status given by nursing staff. No further diagnostic testing indicated.    Please refer to the physical examination documented by the anesthesiologist in the anesthesia record on the day of surgery.    Final plan per anesthesiologist on day of surgery.    Video-Visit Details    Type of service:  Video Visit    Provider received verbal consent for a Video Visit from the patient? Yes    Video Start Time: 0828  Video End Time (time video stopped): 0845    Originating Location (pt. Location): Home    Distant Location (provider location): Off-site    Mode of Communication:  Video Conference via [employer]  On the day of service:     Prep time: 14 minutes  Visit time: 17 minutes  Documentation time: 12 minutes  ------------------------------------------  Total time: 43 minutes      Ailyn Avelar PA-C  Preoperative Assessment Center  [facility]  Clinic and Surgery Center  Phone: [phone]  Fax: [phone]  "

## 2022-11-03 ENCOUNTER — LAB (OUTPATIENT)
Dept: LAB | Facility: CLINIC | Age: 57
End: 2022-11-03
Payer: COMMERCIAL

## 2022-11-03 DIAGNOSIS — M16.31 OSTEOARTHRITIS OF RIGHT HIP JOINT DUE TO DYSPLASIA: ICD-10-CM

## 2022-11-03 DIAGNOSIS — Z01.818 PREOP EXAMINATION: ICD-10-CM

## 2022-11-03 DIAGNOSIS — Z11.59 NEED FOR HEPATITIS C SCREENING TEST: Primary | ICD-10-CM

## 2022-11-03 LAB
ANION GAP SERPL CALCULATED.3IONS-SCNC: 12 MMOL/L (ref 7–15)
BASOPHILS # BLD AUTO: 0 10E3/UL (ref 0–0.2)
BASOPHILS NFR BLD AUTO: 1 %
BUN SERPL-MCNC: 16.6 MG/DL (ref 6–20)
CALCIUM SERPL-MCNC: 9.9 MG/DL (ref 8.6–10)
CHLORIDE SERPL-SCNC: 102 MMOL/L (ref 98–107)
CREAT SERPL-MCNC: 0.74 MG/DL (ref 0.51–0.95)
DEPRECATED HCO3 PLAS-SCNC: 25 MMOL/L (ref 22–29)
EOSINOPHIL # BLD AUTO: 0.1 10E3/UL (ref 0–0.7)
EOSINOPHIL NFR BLD AUTO: 1 %
ERYTHROCYTE [DISTWIDTH] IN BLOOD BY AUTOMATED COUNT: 11.9 % (ref 10–15)
GFR SERPL CREATININE-BSD FRML MDRD: >90 ML/MIN/1.73M2
GLUCOSE SERPL-MCNC: 89 MG/DL (ref 70–99)
HCT VFR BLD AUTO: 36.8 % (ref 35–47)
HCV AB SERPL QL IA: NONREACTIVE
HGB BLD-MCNC: 12.3 G/DL (ref 11.7–15.7)
IMM GRANULOCYTES # BLD: 0 10E3/UL
IMM GRANULOCYTES NFR BLD: 0 %
LYMPHOCYTES # BLD AUTO: 2.4 10E3/UL (ref 0.8–5.3)
LYMPHOCYTES NFR BLD AUTO: 33 %
MCH RBC QN AUTO: 30.8 PG (ref 26.5–33)
MCHC RBC AUTO-ENTMCNC: 33.4 G/DL (ref 31.5–36.5)
MCV RBC AUTO: 92 FL (ref 78–100)
MONOCYTES # BLD AUTO: 0.7 10E3/UL (ref 0–1.3)
MONOCYTES NFR BLD AUTO: 9 %
NEUTROPHILS # BLD AUTO: 4.2 10E3/UL (ref 1.6–8.3)
NEUTROPHILS NFR BLD AUTO: 56 %
NRBC # BLD AUTO: 0 10E3/UL
NRBC BLD AUTO-RTO: 0 /100
PLATELET # BLD AUTO: 215 10E3/UL (ref 150–450)
POTASSIUM SERPL-SCNC: 3.9 MMOL/L (ref 3.4–5.3)
RBC # BLD AUTO: 3.99 10E6/UL (ref 3.8–5.2)
SODIUM SERPL-SCNC: 139 MMOL/L (ref 136–145)
WBC # BLD AUTO: 7.4 10E3/UL (ref 4–11)

## 2022-11-03 PROCEDURE — 80048 BASIC METABOLIC PNL TOTAL CA: CPT | Performed by: PATHOLOGY

## 2022-11-03 PROCEDURE — 86803 HEPATITIS C AB TEST: CPT | Mod: 90 | Performed by: PATHOLOGY

## 2022-11-03 PROCEDURE — 85025 COMPLETE CBC W/AUTO DIFF WBC: CPT | Performed by: PATHOLOGY

## 2022-11-03 PROCEDURE — 99000 SPECIMEN HANDLING OFFICE-LAB: CPT | Performed by: PATHOLOGY

## 2022-11-03 PROCEDURE — 36415 COLL VENOUS BLD VENIPUNCTURE: CPT | Performed by: PATHOLOGY

## 2022-11-05 NOTE — RESULT ENCOUNTER NOTE
Your screening test was negative for Hepatitis C, which is great news! You have NOT been infected with this liver disease.  You do not need any further testing for Hepatitis C.

## 2022-11-22 ENCOUNTER — VIRTUAL VISIT (OUTPATIENT)
Dept: PHYSICAL THERAPY | Facility: CLINIC | Age: 57
End: 2022-11-22
Payer: COMMERCIAL

## 2022-11-22 DIAGNOSIS — M16.11 PRIMARY OSTEOARTHRITIS OF RIGHT HIP: Primary | ICD-10-CM

## 2022-11-22 PROCEDURE — 97530 THERAPEUTIC ACTIVITIES: CPT | Mod: GP | Performed by: PHYSICAL THERAPIST

## 2022-11-22 PROCEDURE — 97161 PT EVAL LOW COMPLEX 20 MIN: CPT | Mod: GP | Performed by: PHYSICAL THERAPIST

## 2022-11-22 NOTE — PROGRESS NOTES
Physical Therapy Initial Evaluation: Subjective History  Date of Surgery: 11/28/2022.    Surgical Procedure/Limb: Right ANDREW  Surgeon Name: Dr. Marcano  Average Daily Pain Levels: 3-4/10 (Location: global; Quality: Aching/Throbbing)  Other Symptoms: Catching, locking  Symptom Mgmt Strategies: Activity modification  Prior orthopaedic history/procedures: See Epic  Prior non-operative management: See Epic  Next MD Appt Date: Post-op visit    Previous level of function: Independent  Home set up: 5 stairs to enter, bedroom, bathroom on main floor    Patient Employment: [employer]  Typical Physical Activities: Walking    Pre-Operative Physical Therapy Examination    Vitals: not assessed    Physical Mobility Status  Gait: Antalgic, mild trendelenberg  Sit-to-Stand Transfer:  Sit to stand from chair independently    Anthropometric Measures: not assessed, virtual visit        Assessment/Plan  Patient is a 57 year old female with right side hip complaints.    Patient has the following significant findings with corresponding treatment plan.                Diagnosis 1: pre-op right ANDREW  Pain -  hot/cold therapy, electric stimulation, manual therapy, splint/taping/bracing/orthotics, and self management  Decreased ROM/flexibility - manual therapy, therapeutic exercise, and home program  Decreased joint mobility - manual therapy and therapeutic exercise  Decreased strength - therapeutic exercise, therapeutic activities, and home program  Impaired balance - neuro re-education and therapeutic activities  Decreased proprioception - neuro re-education and therapeutic activities  Edema - vasopneumatics, electric stimulation, cold therapy, cryocuff, and self management/home program  Impaired gait - gait training and assistive devices  Impaired muscle performance - neuro re-education  Decreased function - therapeutic activities    Previous and current functional limitations:  (See Goal Flow Sheet for this information)    Short term  and Long term goals: (See Goal Flow Sheet for this information)     Communication ability:  Patient appears to be able to clearly communicate and understand verbal and written communication and follow directions correctly.  Treatment Explanation - The following has been discussed with the patient:   RX ordered/plan of care  Anticipated outcomes  Possible risks and side effects  This patient would benefit from PT intervention to resume normal activities.   Rehab potential is excellent.    Frequency:  1 X week, once daily  Duration:  for 1Answers for HPI/ROS submitted by the patient on 11/21/2022  Reason for Visit:: PT consult appointment before total right hip replacement on 11/28/22  Number scale: 3/10  General health as reported by patient: excellent  Medical allergies: none  Surgeries: orthopedic surgery  Medications you are currently taking: none  Occupation:: Clinical  - [employer]  What are your primary job tasks: computer work, prolonged sitting, repetitive tasks     weeks  Discharge Plan:  Achieve all LTG.  Independent in home treatment program.  Reach maximal therapeutic benefit.    Please refer to the daily flowsheet for treatment today, total treatment time and time spent performing 1:1 timed codes.

## 2022-11-23 ENCOUNTER — ANESTHESIA EVENT (OUTPATIENT)
Dept: SURGERY | Facility: AMBULATORY SURGERY CENTER | Age: 57
End: 2022-11-23
Payer: COMMERCIAL

## 2022-11-25 ENCOUNTER — DOCUMENTATION ONLY (OUTPATIENT)
Dept: ORTHOPEDICS | Facility: CLINIC | Age: 57
End: 2022-11-25

## 2022-11-25 NOTE — PROGRESS NOTES
Received Completed forms Yes   Faxed Forms Faxed To: [place]  Fax Number: [phone]   Sent to [facility] (Date) 11/18/22

## 2022-11-27 ENCOUNTER — HEALTH MAINTENANCE LETTER (OUTPATIENT)
Age: 57
End: 2022-11-27

## 2022-11-28 ENCOUNTER — ANESTHESIA (OUTPATIENT)
Dept: SURGERY | Facility: AMBULATORY SURGERY CENTER | Age: 57
End: 2022-11-28
Payer: COMMERCIAL

## 2022-11-28 ENCOUNTER — THERAPY VISIT (OUTPATIENT)
Dept: PHYSICAL THERAPY | Facility: CLINIC | Age: 57
End: 2022-11-28
Payer: COMMERCIAL

## 2022-11-28 ENCOUNTER — DOCUMENTATION ONLY (OUTPATIENT)
Dept: ORTHOPEDICS | Facility: CLINIC | Age: 57
End: 2022-11-28

## 2022-11-28 ENCOUNTER — HOSPITAL ENCOUNTER (OUTPATIENT)
Facility: AMBULATORY SURGERY CENTER | Age: 57
Discharge: HOME | End: 2022-11-28
Attending: ORTHOPAEDIC SURGERY
Payer: COMMERCIAL

## 2022-11-28 VITALS
HEIGHT: 62 IN | BODY MASS INDEX: 23.92 KG/M2 | TEMPERATURE: 98 F | OXYGEN SATURATION: 99 % | SYSTOLIC BLOOD PRESSURE: 105 MMHG | RESPIRATION RATE: 14 BRPM | WEIGHT: 130 LBS | HEART RATE: 94 BPM | DIASTOLIC BLOOD PRESSURE: 60 MMHG

## 2022-11-28 DIAGNOSIS — M16.31 OSTEOARTHRITIS OF RIGHT HIP JOINT DUE TO DYSPLASIA: ICD-10-CM

## 2022-11-28 DIAGNOSIS — M16.11 PRIMARY OSTEOARTHRITIS OF RIGHT HIP: Primary | ICD-10-CM

## 2022-11-28 PROCEDURE — 27130 TOTAL HIP ARTHROPLASTY: CPT

## 2022-11-28 PROCEDURE — 97161 PT EVAL LOW COMPLEX 20 MIN: CPT | Mod: GP | Performed by: PHYSICAL THERAPIST

## 2022-11-28 PROCEDURE — 27130 TOTAL HIP ARTHROPLASTY: CPT | Mod: RT

## 2022-11-28 PROCEDURE — 97110 THERAPEUTIC EXERCISES: CPT | Mod: GP | Performed by: PHYSICAL THERAPIST

## 2022-11-28 PROCEDURE — 97530 THERAPEUTIC ACTIVITIES: CPT | Mod: GP | Performed by: PHYSICAL THERAPIST

## 2022-11-28 DEVICE — IMP SCR ACET SNN SPHERICAL HEAD 6.5X25MM 71332525: Type: IMPLANTABLE DEVICE | Site: HIP | Status: FUNCTIONAL

## 2022-11-28 DEVICE — IMPLANTABLE DEVICE: Type: IMPLANTABLE DEVICE | Site: HIP | Status: FUNCTIONAL

## 2022-11-28 DEVICE — IMP LINER S&N ACET R3 XLPE 36X48MM 0DEG 71339548: Type: IMPLANTABLE DEVICE | Site: HIP | Status: FUNCTIONAL

## 2022-11-28 DEVICE — IMP SHELL SNR ACET R3 3H 48MM 71335548: Type: IMPLANTABLE DEVICE | Site: HIP | Status: FUNCTIONAL

## 2022-11-28 DEVICE — IMP HEAD FEMORAL SNR COBALT 32MM -3 71303203: Type: IMPLANTABLE DEVICE | Site: HIP | Status: FUNCTIONAL

## 2022-11-28 DEVICE — IMP SCR ACET SNN SPHERICAL HEAD 6.5X40MM 71332540: Type: IMPLANTABLE DEVICE | Site: HIP | Status: FUNCTIONAL

## 2022-11-28 RX ORDER — HYDROMORPHONE HYDROCHLORIDE 1 MG/ML
0.2 INJECTION, SOLUTION INTRAMUSCULAR; INTRAVENOUS; SUBCUTANEOUS EVERY 5 MIN PRN
Status: DISCONTINUED | OUTPATIENT
Start: 2022-11-28 | End: 2022-11-28 | Stop reason: HOSPADM

## 2022-11-28 RX ORDER — FENTANYL CITRATE 50 UG/ML
INJECTION, SOLUTION INTRAMUSCULAR; INTRAVENOUS PRN
Status: DISCONTINUED | OUTPATIENT
Start: 2022-11-28 | End: 2022-11-28

## 2022-11-28 RX ORDER — POLYETHYLENE GLYCOL 3350 17 G/17G
1 POWDER, FOR SOLUTION ORAL DAILY
Qty: 7 PACKET | Refills: 0 | Status: SHIPPED | OUTPATIENT
Start: 2022-11-28 | End: 2023-03-03

## 2022-11-28 RX ORDER — LIDOCAINE 40 MG/G
CREAM TOPICAL
Status: DISCONTINUED | OUTPATIENT
Start: 2022-11-28 | End: 2022-11-28 | Stop reason: HOSPADM

## 2022-11-28 RX ORDER — PROPOFOL 10 MG/ML
INJECTION, EMULSION INTRAVENOUS PRN
Status: DISCONTINUED | OUTPATIENT
Start: 2022-11-28 | End: 2022-11-28

## 2022-11-28 RX ORDER — TRANEXAMIC ACID 100 MG/ML
2 INJECTION, SOLUTION INTRAVENOUS ONCE
Status: COMPLETED | OUTPATIENT
Start: 2022-11-28 | End: 2022-11-28

## 2022-11-28 RX ORDER — DEXAMETHASONE SODIUM PHOSPHATE 4 MG/ML
INJECTION, SOLUTION INTRA-ARTICULAR; INTRALESIONAL; INTRAMUSCULAR; INTRAVENOUS; SOFT TISSUE PRN
Status: DISCONTINUED | OUTPATIENT
Start: 2022-11-28 | End: 2022-11-28

## 2022-11-28 RX ORDER — ACETAMINOPHEN 325 MG/1
650 TABLET ORAL EVERY 4 HOURS PRN
Qty: 100 TABLET | Refills: 0 | Status: SHIPPED | OUTPATIENT
Start: 2022-11-28 | End: 2023-03-03

## 2022-11-28 RX ORDER — ONDANSETRON 2 MG/ML
4 INJECTION INTRAMUSCULAR; INTRAVENOUS EVERY 30 MIN PRN
Status: DISCONTINUED | OUTPATIENT
Start: 2022-11-28 | End: 2022-11-29 | Stop reason: HOSPADM

## 2022-11-28 RX ORDER — ONDANSETRON 4 MG/1
4 TABLET, ORALLY DISINTEGRATING ORAL EVERY 8 HOURS PRN
Qty: 15 TABLET | Refills: 0 | Status: SHIPPED | OUTPATIENT
Start: 2022-11-28 | End: 2023-03-03

## 2022-11-28 RX ORDER — OXYCODONE HYDROCHLORIDE 5 MG/1
5-10 TABLET ORAL EVERY 4 HOURS PRN
Qty: 26 TABLET | Refills: 0 | Status: SHIPPED | OUTPATIENT
Start: 2022-11-28 | End: 2023-03-03

## 2022-11-28 RX ORDER — ONDANSETRON 4 MG/1
4 TABLET, ORALLY DISINTEGRATING ORAL EVERY 30 MIN PRN
Status: DISCONTINUED | OUTPATIENT
Start: 2022-11-28 | End: 2022-11-29 | Stop reason: HOSPADM

## 2022-11-28 RX ORDER — HALOPERIDOL 5 MG/ML
1 INJECTION INTRAMUSCULAR
Status: DISCONTINUED | OUTPATIENT
Start: 2022-11-28 | End: 2022-11-29 | Stop reason: HOSPADM

## 2022-11-28 RX ORDER — FENTANYL CITRATE 50 UG/ML
50 INJECTION, SOLUTION INTRAMUSCULAR; INTRAVENOUS EVERY 5 MIN PRN
Status: DISCONTINUED | OUTPATIENT
Start: 2022-11-28 | End: 2022-11-28 | Stop reason: HOSPADM

## 2022-11-28 RX ORDER — FENTANYL CITRATE 50 UG/ML
25 INJECTION, SOLUTION INTRAMUSCULAR; INTRAVENOUS EVERY 5 MIN PRN
Status: DISCONTINUED | OUTPATIENT
Start: 2022-11-28 | End: 2022-11-28 | Stop reason: HOSPADM

## 2022-11-28 RX ORDER — ONDANSETRON 2 MG/ML
INJECTION INTRAMUSCULAR; INTRAVENOUS PRN
Status: DISCONTINUED | OUTPATIENT
Start: 2022-11-28 | End: 2022-11-28

## 2022-11-28 RX ORDER — ACETAMINOPHEN 325 MG/1
650 TABLET ORAL
Status: DISCONTINUED | OUTPATIENT
Start: 2022-11-28 | End: 2022-11-29 | Stop reason: HOSPADM

## 2022-11-28 RX ORDER — MEPERIDINE HYDROCHLORIDE 25 MG/ML
12.5 INJECTION INTRAMUSCULAR; INTRAVENOUS; SUBCUTANEOUS
Status: DISCONTINUED | OUTPATIENT
Start: 2022-11-28 | End: 2022-11-29 | Stop reason: HOSPADM

## 2022-11-28 RX ORDER — AMOXICILLIN 250 MG
1-2 CAPSULE ORAL 2 TIMES DAILY
Qty: 30 TABLET | Refills: 0 | Status: SHIPPED | OUTPATIENT
Start: 2022-11-28 | End: 2023-03-03

## 2022-11-28 RX ORDER — TRANEXAMIC ACID 650 MG/1
1950 TABLET ORAL ONCE
Status: DISCONTINUED | OUTPATIENT
Start: 2022-11-28 | End: 2022-11-28

## 2022-11-28 RX ORDER — OXYCODONE HYDROCHLORIDE 5 MG/1
5 TABLET ORAL
Status: COMPLETED | OUTPATIENT
Start: 2022-11-28 | End: 2022-11-28

## 2022-11-28 RX ORDER — OXYCODONE HYDROCHLORIDE 5 MG/1
5 TABLET ORAL ONCE
Status: COMPLETED | OUTPATIENT
Start: 2022-11-28 | End: 2022-11-28

## 2022-11-28 RX ORDER — CEFAZOLIN SODIUM 2 G/50ML
2 SOLUTION INTRAVENOUS
Status: COMPLETED | OUTPATIENT
Start: 2022-11-28 | End: 2022-11-28

## 2022-11-28 RX ORDER — ALBUTEROL SULFATE 0.83 MG/ML
2.5 SOLUTION RESPIRATORY (INHALATION) EVERY 4 HOURS PRN
Status: DISCONTINUED | OUTPATIENT
Start: 2022-11-28 | End: 2022-11-28 | Stop reason: HOSPADM

## 2022-11-28 RX ORDER — ACETAMINOPHEN 325 MG/1
975 TABLET ORAL ONCE
Status: COMPLETED | OUTPATIENT
Start: 2022-11-28 | End: 2022-11-28

## 2022-11-28 RX ORDER — SODIUM CHLORIDE, SODIUM LACTATE, POTASSIUM CHLORIDE, CALCIUM CHLORIDE 600; 310; 30; 20 MG/100ML; MG/100ML; MG/100ML; MG/100ML
INJECTION, SOLUTION INTRAVENOUS CONTINUOUS
Status: DISCONTINUED | OUTPATIENT
Start: 2022-11-28 | End: 2022-11-28 | Stop reason: HOSPADM

## 2022-11-28 RX ORDER — HYDROMORPHONE HYDROCHLORIDE 1 MG/ML
0.4 INJECTION, SOLUTION INTRAMUSCULAR; INTRAVENOUS; SUBCUTANEOUS EVERY 5 MIN PRN
Status: DISCONTINUED | OUTPATIENT
Start: 2022-11-28 | End: 2022-11-28 | Stop reason: HOSPADM

## 2022-11-28 RX ORDER — ACETAMINOPHEN 325 MG/1
975 TABLET ORAL ONCE
Status: DISCONTINUED | OUTPATIENT
Start: 2022-11-28 | End: 2022-11-28 | Stop reason: HOSPADM

## 2022-11-28 RX ORDER — ASPIRIN 81 MG/1
81 TABLET ORAL 2 TIMES DAILY
Qty: 60 TABLET | Refills: 0 | Status: SHIPPED | OUTPATIENT
Start: 2022-11-28 | End: 2023-03-03

## 2022-11-28 RX ORDER — PROPOFOL 10 MG/ML
INJECTION, EMULSION INTRAVENOUS CONTINUOUS PRN
Status: DISCONTINUED | OUTPATIENT
Start: 2022-11-28 | End: 2022-11-28

## 2022-11-28 RX ORDER — CEFAZOLIN SODIUM 2 G/50ML
2 SOLUTION INTRAVENOUS SEE ADMIN INSTRUCTIONS
Status: DISCONTINUED | OUTPATIENT
Start: 2022-11-28 | End: 2022-11-28 | Stop reason: HOSPADM

## 2022-11-28 RX ORDER — EPHEDRINE SULFATE 50 MG/ML
INJECTION, SOLUTION INTRAMUSCULAR; INTRAVENOUS; SUBCUTANEOUS PRN
Status: DISCONTINUED | OUTPATIENT
Start: 2022-11-28 | End: 2022-11-28

## 2022-11-28 RX ORDER — LIDOCAINE HYDROCHLORIDE 20 MG/ML
INJECTION, SOLUTION INFILTRATION; PERINEURAL PRN
Status: DISCONTINUED | OUTPATIENT
Start: 2022-11-28 | End: 2022-11-28

## 2022-11-28 RX ADMIN — DEXAMETHASONE SODIUM PHOSPHATE 4 MG: 4 INJECTION, SOLUTION INTRA-ARTICULAR; INTRALESIONAL; INTRAMUSCULAR; INTRAVENOUS; SOFT TISSUE at 07:35

## 2022-11-28 RX ADMIN — FENTANYL CITRATE 25 MCG: 50 INJECTION, SOLUTION INTRAMUSCULAR; INTRAVENOUS at 10:38

## 2022-11-28 RX ADMIN — PROPOFOL 200 MCG/KG/MIN: 10 INJECTION, EMULSION INTRAVENOUS at 07:34

## 2022-11-28 RX ADMIN — SODIUM CHLORIDE, SODIUM LACTATE, POTASSIUM CHLORIDE, CALCIUM CHLORIDE: 600; 310; 30; 20 INJECTION, SOLUTION INTRAVENOUS at 06:36

## 2022-11-28 RX ADMIN — FENTANYL CITRATE 25 MCG: 50 INJECTION, SOLUTION INTRAMUSCULAR; INTRAVENOUS at 11:02

## 2022-11-28 RX ADMIN — Medication 0.5 MG: at 08:33

## 2022-11-28 RX ADMIN — FENTANYL CITRATE 25 MCG: 50 INJECTION, SOLUTION INTRAMUSCULAR; INTRAVENOUS at 10:32

## 2022-11-28 RX ADMIN — CEFAZOLIN SODIUM 2 G: 2 SOLUTION INTRAVENOUS at 07:24

## 2022-11-28 RX ADMIN — OXYCODONE HYDROCHLORIDE 5 MG: 5 TABLET ORAL at 15:32

## 2022-11-28 RX ADMIN — ONDANSETRON 4 MG: 2 INJECTION INTRAMUSCULAR; INTRAVENOUS at 07:24

## 2022-11-28 RX ADMIN — OXYCODONE HYDROCHLORIDE 5 MG: 5 TABLET ORAL at 11:15

## 2022-11-28 RX ADMIN — FENTANYL CITRATE 50 MCG: 50 INJECTION, SOLUTION INTRAMUSCULAR; INTRAVENOUS at 07:33

## 2022-11-28 RX ADMIN — ACETAMINOPHEN 650 MG: 325 TABLET ORAL at 15:30

## 2022-11-28 RX ADMIN — Medication 10 MG: at 07:48

## 2022-11-28 RX ADMIN — TRANEXAMIC ACID 1 G: 100 INJECTION, SOLUTION INTRAVENOUS at 09:25

## 2022-11-28 RX ADMIN — EPHEDRINE SULFATE 5 MG: 50 INJECTION, SOLUTION INTRAMUSCULAR; INTRAVENOUS; SUBCUTANEOUS at 08:40

## 2022-11-28 RX ADMIN — TRANEXAMIC ACID 1 G: 100 INJECTION, SOLUTION INTRAVENOUS at 07:39

## 2022-11-28 RX ADMIN — FENTANYL CITRATE 50 MCG: 50 INJECTION, SOLUTION INTRAMUSCULAR; INTRAVENOUS at 07:45

## 2022-11-28 RX ADMIN — SODIUM CHLORIDE, SODIUM LACTATE, POTASSIUM CHLORIDE, CALCIUM CHLORIDE: 600; 310; 30; 20 INJECTION, SOLUTION INTRAVENOUS at 09:28

## 2022-11-28 RX ADMIN — ACETAMINOPHEN 975 MG: 325 TABLET ORAL at 06:35

## 2022-11-28 RX ADMIN — PROPOFOL 130 MG: 10 INJECTION, EMULSION INTRAVENOUS at 07:34

## 2022-11-28 RX ADMIN — LIDOCAINE HYDROCHLORIDE 60 MG: 20 INJECTION, SOLUTION INFILTRATION; PERINEURAL at 07:33

## 2022-11-28 RX ADMIN — Medication 40 MG: at 07:34

## 2022-11-28 RX ADMIN — EPHEDRINE SULFATE 5 MG: 50 INJECTION, SOLUTION INTRAMUSCULAR; INTRAVENOUS; SUBCUTANEOUS at 07:58

## 2022-11-28 NOTE — OP NOTE
OPERATIVE REPORT    DATE OF SERVICE: 11/28/22    SURGEON: Jimbo Marcano MD.    ASSISTANT(S):  Jaelyn DOMINGUEZ and Leoncio Russell MD    PREOPERATIVE DIAGNOSIS:  Osteoarthritis    POSTOPERATIVE DIAGNOSIS:  Osteoarthritis    OPERATION PERFORMED:  Right total hip arthroplasty    IMPLANTS:    Implant Name Type Inv. Item Serial No.  Lot No. LRB No. Used Action   IMP SHELL SNR ACET R3 3H 48MM [id number] - [id number] Total Joint Component/Insert IMP SHELL SNR ACET R3 3H 48MM [id number]  [employer] [id number] Right 1 Implanted   IMP LINER S&N ACET R3 XLPE [id number] 0DEG [id number] - [id number] Total Joint Component/Insert IMP LINER S&N ACET R3 XLPE [id number] 0DEG [id number]  [employer] [id number] Right 1 Implanted   IMP SCR ACET SNN SPHERICAL HEAD 6.5X25MM [id number] - [id number] Metallic Hardware/[place] IMP SCR ACET SNN SPHERICAL HEAD 6.5X25MM [id number]  [employer] [id number] Right 1 Implanted   IMP SCR ACET SNN SPHERICAL HEAD 6.5X40MM [id number] - [id number] Metallic Hardware/[place] IMP SCR ACET SNN SPHERICAL HEAD 6.5X40MM [id number]  [employer] [id number] Right 1 Implanted   High Offset AFIT Porous Plus HA    [employer] [id number] Right 1 Implanted   IMP HEAD FEMORAL SNR [place] 32MM -3 [id number] - [id number] Total Joint Component/Insert IMP HEAD FEMORAL SNR [place] 32MM -3 [id number]  [employer] [id number] Right 1 Implanted       ANESTHETIC: general     OPERATIVE FINDINGS:  End stage arthrosis of the hip    BLOOD LOSS: about 250 ml     COMPLICATIONS:  None apparent    OPERATIVE INDICATIONS:  The patient has a long history of debilitating pain secondary to ostearthritis of the hip.  Despite comprehensive non-operative management these symptoms continued to interfere with activities of daily living.  After discussion of further treatment options including the risks and benefits that patient elected to proceed with a total hip.    DESCRIPTION OF THE PROCEDURE:  The patient was  identified in the preoperative holding area.  The consent form including the risks and benefits were reviewed with the patient.  The operative limb was identified and marked.  The patient was brought back to the operating room and placed supine on the operating table.  An anesthetic was induced by the anesthesia team.   The patient was placed in the lateral decubitus position and prepped and draped in the normal standard fashion for a hip replacement.  A time-out was called.  Antibiotics were given.  We utilized an approximately 15 cm curvilinear incision, centered on the vastus ridge, and performed a standard posterior approach to the hip.  The tensor fascia was split.  A small portion of gluteus fe was split in line with its fibers.  The sciatic nerve was palpated.  The east-west retractor was placed.  The posterior border of gluteus medius was exposed and retracted.  The tendon of piriformis and that of the obturators was released from their attachments.  A trapdoor posterior capsulotomy was performed.  The hip was dislocated.  The lesser trochanter was exposed.  A ruler was used to measure and electrocautery was used to keanu our neck cut as preoperatively templated.  The head was measured with a caliper and found to be 44mm.  This measurement was used to choose our first reamer.  The neck cut was re-measured. The femur was elevated.  A Hohmann was placed over the anterior rim of the acetabulum and the femur was subluxed anterior.  A split was made in the inferior capsule.  The transverse acetabular ligament was left intact and used a guide for the anterversion of the acetabular component.  Circumferential retractors were placed.  We began reaming and went up by two until sufficient contact was made with the acetabular rim.  We then went up by one millimeter for a one millimeter press-fit.  We were within one size of our preoperative plan.   A trail was placed.  It had an excellent press fit.  We then placed  "out final component in 40 degrees of inclination and approximately 20 degrees of anteversion, parallel to the transverse ligament.  The press fit was excellent.  Screws were placed for additional initial fixation.  A flat liner was then placed. It locked into place.  Attention was turned to the femur.  Retractors were placed to elevated the proximal femur and to protect the tendon of gluteus medius.  Remaining lateral neck was removed and the piriformis fossa was cleared of soft-tissue.  A box osteotome and canal finder were used to prepare for broaching.  A sharp broach was used to lateralize slightly.  We then broached up sequentially to a size 3.  It was rotationally stable and sat up 1-2 millimeters from the neck-cut. We trialed the following femoral heads: 0 and -3.  The -3 appropriately tensioned the abductors and clinically equalized the leg-lengths.  The stability exam was excellent.  The hip was stable and there was no impingement posteriorly with hyper-extension and maximal external rotation.  With full extension, the knee could be fixed to bring the foot nearly to the buttock.  With the hip in ninety degrees of flexion and neutral rotation there was greater than 60 degrees of internal rotation before subluxation.  There appropriate movement with a \"margot\" test.  Happy with our stability exam, the final implant was placed in approximately twenty degrees of anteversion.  It sat within 1 mm of the broach.  We then trailed with a -3 head.  The stability exam was identical.  We then placed the final head on a clean, dry neck and impacted it into place. The hip was reduced after directly visualizing the entire acetabulum.  The wound was then irrigated.  The posterior capsule and short external rotators were sutured to the greater trochanter with non-absorbable suture through bone tunnels.The fascia was closed with interrupted Vicryl, the dermis with interrupted Vicryl, and skin with running monocryl, Dermabond " and steri-strips.  At the end of the procedure the sponge and needle counts were correct times two.  The patient tolerated the procedure well and returned to the PAR extubated and stable.    POSTOPERATIVE PLAN:  1. Weight bearing as tolerated  2. Standard posterior hip precautions  3. DVT prophylaxis   4. 24 hours of prophylactic antibiotics  5. Follow-up:  Wound clinic in 2 weeks and with Jeet in clinic in 6 weeks for x-rays and a rehabilitation check.

## 2022-11-28 NOTE — PROGRESS NOTES
Type of Form  Accommodation Request Form from [facility]   Received Completed forms Yes   Faxed Forms Emailed to: [email or web address] (form states to email to [email or web address])  Emailed to: Lorena MANN for records     Sent to HIM (Date) 11.28.22

## 2022-11-28 NOTE — ANESTHESIA POSTPROCEDURE EVALUATION
Patient: Josette Gregg    Procedure: Procedure(s):  ARTHROPLASTY, HIP, TOTAL RIGHT       Anesthesia Type:  General    Note:  Disposition: Outpatient   Postop Pain Control: Uneventful            Sign Out: Well controlled pain   PONV: No   Neuro/Psych: Uneventful            Sign Out: Acceptable/Baseline neuro status   Airway/Respiratory: Uneventful            Sign Out: Acceptable/Baseline resp. status   CV/Hemodynamics: Uneventful            Sign Out: Acceptable CV status; No obvious hypovolemia; No obvious fluid overload   Other NRE: NONE   DID A NON-ROUTINE EVENT OCCUR? No           Last vitals:  Vitals Value Taken Time   /66 11/28/22 1115   Temp 36.5  C (97.7  F) 11/28/22 1115   Pulse 84 11/28/22 1115   Resp 10 11/28/22 1115   SpO2 99 % 11/28/22 1115       Electronically Signed By: Kalen Summers DO  November 28, 2022  12:27 PM

## 2022-11-28 NOTE — ANESTHESIA CARE TRANSFER NOTE
Patient: Josette Gregg    Procedure: Procedure(s):  ARTHROPLASTY, HIP, TOTAL RIGHT       Diagnosis: Osteoarthritis of right hip joint due to dysplasia [M16.31]  Diagnosis Additional Information: No value filed.    Anesthesia Type:   General     Note:    Oropharynx: oropharynx clear of all foreign objects and spontaneously breathing  Level of Consciousness: drowsy  Oxygen Supplementation: face mask  Level of Supplemental Oxygen (L/min / FiO2): 4  Independent Airway: airway patency satisfactory and stable  Dentition: dentition unchanged  Vital Signs Stable: post-procedure vital signs reviewed and stable  Report to RN Given: handoff report given  Patient transferred to: PACU  Comments: Uneventful transport   Report to RN - Herminia Charlton  Exchanging well; color natl  Pt responds appropriately to command; sleepy; resting comfortably  IV patent  Lips/teeth/dentition as preop status  Questions answered    Handoff Report: Identifed the Patient, Identified the Reponsible Provider, Reviewed the pertinent medical history, Discussed the surgical course, Reviewed Intra-OP anesthesia mangement and issues during anesthesia, Set expectations for post-procedure period and Allowed opportunity for questions and acknowledgement of understanding      Vitals:  Vitals Value Taken Time   /55    Temp 96.8    Pulse 80 nsr    Resp 16    SpO2 100%        Electronically Signed By: HERO IRIZARRY CRNA  November 28, 2022  10:05 AM

## 2022-11-28 NOTE — PROGRESS NOTES
Physical Therapy Initial Evaluation: Subjective History  Date of Surgery: 11/28/22.    Surgical Procedure/Limb: R ANDREW  Surgeon Name: Dr. Marcano  Average Daily Pain Levels: na/10 (Location: lateral; Quality: Aching/Throbbing)  Other Symptoms: none  Symptom Mgmt Strategies: pain meds, ice  Prior orthopaedic history/procedures: s/p contralateral ANDREW  Prior non-operative management: none  Next MD Appt Date: 2 week interval follow up    Previous level of function: no restrictions  Home set up: 3 steps into home, bed/bath on main floor    Patient Employment: admin [employer]  Typical Physical Activities: walking    Post-Operative Physical Therapy Examination    Vitals  Heart Rate: 86bpm  Blood Pressure: 105/60 (seated, R arm) Pulse oximetry: 98%    Physical Mobility Status  Gait: wbat, 2ww  Sit-to-Stand Transfer:  Sit to stand from chair with contact guard assist     Anthropometric Measures     Right Left Difference   Joint ROM      Flexion 70 deg wnl deg n/a deg       Quadriceps Muscle Activation Right Left   Isometric Quad Activation Fair Normal       Assessment/Plan       Patient is a 57 year old female with right side hip complaints.    Patient has the following significant findings with corresponding treatment plan.                Diagnosis 1:  s/p R ANDREW  Pain -  hot/cold therapy, electric stimulation, manual therapy, splint/taping/bracing/orthotics, and self management  Decreased ROM/flexibility - manual therapy, therapeutic exercise, and home program  Decreased joint mobility - manual therapy and therapeutic exercise  Decreased strength - therapeutic exercise, therapeutic activities, and home program  Impaired balance - neuro re-education and therapeutic activities  Decreased proprioception - neuro re-education and therapeutic activities  Edema - vasopneumatics, electric stimulation, cold therapy, cryocuff, and self management/home program  Impaired gait - gait training and assistive devices  Impaired muscle performance -  neuro re-education  Decreased function - therapeutic activities    Previous and current functional limitations:  (See Goal Flow Sheet for this information)    Short term and Long term goals: (See Goal Flow Sheet for this information)     Communication ability:  Patient appears to be able to clearly communicate and understand verbal and written communication and follow directions correctly.  Treatment Explanation - The following has been discussed with the patient:   RX ordered/plan of care  Anticipated outcomes  Possible risks and side effects  This patient would benefit from PT intervention to resume normal activities.   Rehab potential is good.    Frequency:  2 X week, once daily  Duration:  for 1 weeks tapering to 1 X a week over 8 weeks  Discharge Plan:  Achieve all LTG.  Independent in home treatment program.  Reach maximal therapeutic benefit.    Please refer to the daily flowsheet for treatment today, total treatment time and time spent performing 1:1 timed codes.     Answers for HPI/ROS submitted by the patient on 11/21/2022  Reason for Visit:: PT consult appointment before total right hip replacement on 11/28/22  Number scale: 3/10  General health as reported by patient: excellent  Medical allergies: none  Surgeries: orthopedic surgery  Medications you are currently taking: none  Occupation:: Clinical  - [employer]  What are your primary job tasks: computer work, prolonged sitting, repetitive tasks

## 2022-11-28 NOTE — ANESTHESIA PREPROCEDURE EVALUATION
Anesthesia Pre-Procedure Evaluation    Patient: Josette Gregg   MRN: [id number] : 1965        Procedure : Procedure(s):  ARTHROPLASTY, HIP, TOTAL RIGHT          Past Medical History:   Diagnosis Date     Congenital hip dysplasia     breech, treated     GERD (gastroesophageal reflux disease)      Left shoulder pain 2012     Osteoarthritis of hip       Past Surgical History:   Procedure Laterality Date     ARTHROPLASTY MINIMALLY INVASIVE HIP Left 2015    Procedure: ARTHROPLASTY MINIMALLY INVASIVE HIP;  Surgeon: Edson Oviedo MD;  Location: [facility] OR     ARTHROSCOPY SHOULDER, OPEN BICEP TENODESIS REPAIR, COMBINED  2014    Procedure: COMBINED ARTHROSCOPY SHOULDER, OPEN BICEP TENODESIS REPAIR;;  Surgeon: Josh Love MD;  Location:  [facility]     ARTHROSCOPY SHOULDER, OPEN ROTATOR CUFF REPAIR, COMBINED  2014    Procedure: COMBINED ARTHROSCOPY SHOULDER, OPEN ROTATOR CUFF REPAIR;  Diagnostic Left SHOULDER ARTHROSCOPY,  OPEN ROTATOR CUFF REPAIR, Biceps Tenodesis;  Surgeon: Josh Love MD;  Location: [facility]     GYN SURGERY       HYSTERECTOMY  2008     ORTHOPEDIC SURGERY       US JOINT INJECTION ASPIRATION MAJOR RIGHT  11/3/2010    left shoulder injection Jagdish see scan      No Known Allergies   Social History     Tobacco Use     Smoking status: Never     Smokeless tobacco: Never   Substance Use Topics     Alcohol use: Yes     Comment: occasional      Wt Readings from Last 1 Encounters:   22 59 kg (130 lb)        Anesthesia Evaluation   Pt has had prior anesthetic. Type: General.        ROS/MED HX  ENT/Pulmonary:  - neg pulmonary ROS     Neurologic:  - neg neurologic ROS     Cardiovascular:  - neg cardiovascular ROS     METS/Exercise Tolerance: >4 METS    Hematologic:  - neg hematologic  ROS     Musculoskeletal:  - neg musculoskeletal ROS     GI/Hepatic:     (+) GERD, Asymptomatic on medication,     Renal/Genitourinary:  - neg Renal ROS     Endo:  - neg endo ROS      Psychiatric/Substance Use:       Infectious Disease:  - neg infectious disease ROS     Malignancy:  - neg malignancy ROS     Other:            Physical Exam    Airway        Mallampati: II   TM distance: > 3 FB      Respiratory Devices and Support         Dental  no notable dental history         Cardiovascular   cardiovascular exam normal          Pulmonary   pulmonary exam normal                OUTSIDE LABS:  CBC:   Lab Results   Component Value Date    WBC 7.4 11/03/2022    WBC 12.6 (H) 02/17/2015    HGB 12.3 11/03/2022    HGB 10.0 (L) 02/19/2015    HCT 36.8 11/03/2022    HCT 33.0 (L) 02/17/2015     11/03/2022     (L) 02/19/2015     BMP:   Lab Results   Component Value Date     11/03/2022     02/18/2015    POTASSIUM 3.9 11/03/2022    POTASSIUM 3.6 02/20/2015    CHLORIDE 102 11/03/2022    CHLORIDE 106 02/18/2015    CO2 25 11/03/2022    CO2 30 02/18/2015    BUN 16.6 11/03/2022    BUN 10 02/18/2015    CR 0.74 11/03/2022    CR 0.63 02/18/2015    GLC 89 11/03/2022    GLC 92 05/11/2018     COAGS:   Lab Results   Component Value Date    PTT 26 02/16/2015    INR 1.18 (H) 02/16/2015    FIBR 198 (L) 02/16/2015     POC:   Lab Results   Component Value Date    HCG Negative 01/29/2008     HEPATIC:   Lab Results   Component Value Date    ALBUMIN 4.4 08/20/2008    PROTTOTAL 7.8 08/20/2008    ALT 17 08/20/2008    AST 24 08/20/2008    ALKPHOS 64 08/20/2008    BILITOTAL 0.6 08/20/2008     OTHER:   Lab Results   Component Value Date    A1C 5.1 03/26/2008    ABHINAV 9.9 11/03/2022    PHOS 3.6 02/20/2015    MAG 1.6 02/20/2015    CRP <3.0 08/20/2008    SED 12 08/20/2008       Anesthesia Plan    ASA Status:  2      Anesthesia Type: General.     - Airway: ETT   Induction: Propofol.   Maintenance: TIVA.        Consents            Postoperative Care    Pain management: IV analgesics, Multi-modal analgesia.   PONV prophylaxis: Dexamethasone or Solumedrol, Background Propofol Infusion, Ondansetron (or other 5HT-3)      Comments:                Kalen Summers, DO

## 2022-11-28 NOTE — PROGRESS NOTES
Recovery advantage patient. VSS, pain well controlled with oral meds. Tolerating po intake. Plan to transport to hotel at 3 pm.

## 2022-11-28 NOTE — DISCHARGE INSTRUCTIONS
Review outpatient procedure discharge instructions with patient as directed by Provider    -Wound Care: Your incision was closed with sutures underneath the skin. Ok to remove Aquacel dressing on post op day 7.    -Pain control: Take medication as prescribed, but only as often as necessary. You have been prescribed a narcotic pain medication for assistance with pain control. Narcotic pain medications have numerous side effects which can include nausea, constipation, drowsiness, and itching. If you experience nausea, this may be relieved by taking th e medication with food. To avoid constipation, you should take a stool softener, as well as drink plenty of water and eat fruits and vegetables. Do not drive or drink alcohol while you are taking narcotic pain medication.  As soon as you are able, you should try to wean off the narcotic pain medication. Remember that Tylenol can be used for pain relief as well, as you wean off the narcotics. Do not exceed 4,000 mg of Tylenol in 24 hours.     -Please ice and elevate your affected area as much as possible to reduce swelling. Swelling is one of the most common causes of pain after surgery.   -Activity: Weight bear as tolerated. Posterior hip precautions (no hip flexion passed 90deg, no adduction passed midline, no internal rotation)    -DVT prophylaxis: aspirin 162mg Daily x 4 weeks    F/U: at 2 weeks with Dr. Maracno for wound check    -CALL OUR CLINIC ([phone] during regular office hours, or [phone] for the on-call resident MD for questions - RESIDENT DOES NOT HAVE ABILITY TO PRESCRIBE NARCOTICS) IF: Pain in your surgical area persists or worsens in the first few days after surgery. Excessive redness or drainage of cloudy or bloody material from the wounds (clear red tinted fluid and some mild drainage should be expected). Drainage of any kind > one week after surgery should be reported to the doctor. You have a temperature elevation greater than 101.5  You have  pain, swelling or redness in your calf. You have numbness or weakness in your leg or foot.    -Call your primary doctor or seek medical care if you have any of the following: temperature greater than 101.4, chest pain, increased shortness of breath, nausea or vomiting.     [facility]  Home Care Following Anesthesia  For 24 hours after surgery:  Get plenty of rest.  A responsible adult must stay with you for at least 24 hours after you leave the surgery center.  Do not drive or use heavy equipment.  If you have weakness or tingling, don't drive or use heavy equipment until this feeling goes away.   Do not drink alcohol.   Avoid strenuous or risky activities.  Ask for help when climbing stairs.  You may feel lightheaded.  IF so, sit for a few minutes before standing.  Have someone help you get up.   If you have nausea (feel sick to your stomach): Drink only clear liquids such as apple juice, ginger ale, broth or 7-Up.  Rest may also help.  Be sure to drink enough fluids.  Move to a regular diet as you feel able.   You may have a slight fever.  Call the doctor if your fever is over 100 F (37.7 C) (taken under the tongue) or lasts longer than 24 hours.  You may have a dry mouth, a sore throat, muscle aches or trouble sleeping. These should go away after 24 hours.  Do not make important or legal decisions.   It is recommended to avoid smoking.   If you use hormonal birth control (such as the pill, patch, ring or implants):  You will need a second form of birth control for 7 days (condoms, a diaphragm or contraceptive foam).  While in the surgery center, you received a medicine called Sugammadex.  Hormonal birth control (such as the pill, patch, ring or implants) will not work as well for a week after taking this medicine.         Tips for taking pain medications  To get the best pain relief possible, remember these points:  Take pain medications as directed, before pain becomes  severe.  Pain medication can upset your stomach: taking it with food may help.  Constipation is a common side effect of pain medication. Drink plenty of  fluids.  Eat foods high in fiber. Take a stool softener if recommended by your doctor or pharmacist.  Do not drink alcohol, drive or operate machinery while taking pain medications.  Ask about other ways to control pain, such as with heat, ice or relaxation.    Tylenol/Acetaminophen Consumption  To help encourage the safe use of acetaminophen, the makers of TYLENOL  have lowered the maximum daily dose for single-ingredient Extra Strength TYLENOL  (acetaminophen) products sold in the [place] from 8 pills per day (4,000 mg) to 6 pills per day (3,000 mg). The dosing interval has also changed from 2 pills every 4-6 hours to 2 pills every 6 hours.  If you feel your pain relief is insufficient, you may take Tylenol/Acetaminophen in addition to your narcotic pain medication.   Be careful not to exceed 3,000 mg of Tylenol/Acetaminophen in a 24 hour period from all sources.  If you are taking extra strength Tylenol/acetaminophen (500 mg), the maximum dose is 6 tablets in 24 hours.  If you are taking regular strength acetaminophen (325 mg), the maximum dose is 9 tablets in 24 hours.    Call a doctor for any of the following:  Signs of infection (fever, growing tenderness at the surgery site, a large amount of drainage or bleeding, severe pain, foul-smelling drainage, redness, swelling).  It has been over 8 to 10 hours since surgery and you are still not able to urinate (pass water).  Headache for over 24 hours.  Numbness, tingling or weakness the day after surgery (if you had spinal anesthesia).  Signs of Covid-19 infection (temperature over 100 degrees, shortness of breath, cough, loss of taste/smell, generalized body aches, persistent headache, chills, sore throat, nausea/vomiting/diarrhea)  Your doctor is:       Dr. Jimbo Marcano, Orthopaedics: [phone]               Or  dial [phone] and ask for the resident on call for:  Orthopaedics  For emergency care, call the:  [facility] Emergency Department:  [phone] (TTY for hearing impaired: [phone])

## 2022-11-29 ENCOUNTER — THERAPY VISIT (OUTPATIENT)
Dept: PHYSICAL THERAPY | Facility: CLINIC | Age: 57
End: 2022-11-29
Payer: COMMERCIAL

## 2022-11-29 DIAGNOSIS — M16.11 PRIMARY OSTEOARTHRITIS OF RIGHT HIP: Primary | ICD-10-CM

## 2022-11-29 PROCEDURE — 97530 THERAPEUTIC ACTIVITIES: CPT | Mod: GP | Performed by: PHYSICAL THERAPIST

## 2022-11-29 PROCEDURE — 97110 THERAPEUTIC EXERCISES: CPT | Mod: GP | Performed by: PHYSICAL THERAPIST

## 2022-12-05 ENCOUNTER — THERAPY VISIT (OUTPATIENT)
Dept: PHYSICAL THERAPY | Facility: CLINIC | Age: 57
End: 2022-12-05
Payer: COMMERCIAL

## 2022-12-05 DIAGNOSIS — Z47.1 AFTERCARE FOLLOWING HIP JOINT REPLACEMENT SURGERY, UNSPECIFIED LATERALITY: ICD-10-CM

## 2022-12-05 DIAGNOSIS — Z96.649 AFTERCARE FOLLOWING HIP JOINT REPLACEMENT SURGERY, UNSPECIFIED LATERALITY: ICD-10-CM

## 2022-12-05 DIAGNOSIS — M16.11 PRIMARY OSTEOARTHRITIS OF RIGHT HIP: Primary | ICD-10-CM

## 2022-12-05 PROCEDURE — 97530 THERAPEUTIC ACTIVITIES: CPT | Mod: GP | Performed by: PHYSICAL THERAPIST

## 2022-12-05 PROCEDURE — 97110 THERAPEUTIC EXERCISES: CPT | Mod: GP | Performed by: PHYSICAL THERAPIST

## 2022-12-05 NOTE — PROGRESS NOTES
Diagnosis: R ANDREW   Precautions/Restrictions/MD instructions/Other pertinent hx posterior hip precautions    Therapist Impression:   Josette is doing well.  We added in weight shifting and squats.    GOALS: Dressing, walking    NEXT: review exercises    PTRX: handouts    Subjective:  R knee is hurting.  New to the cane.  Still having pain which is expected.      Objective:  Hip flexion 70 deg  HIp abduction 10 deg  Gait with quad cane with good technique

## 2022-12-12 ENCOUNTER — THERAPY VISIT (OUTPATIENT)
Dept: PHYSICAL THERAPY | Facility: CLINIC | Age: 57
End: 2022-12-12
Payer: COMMERCIAL

## 2022-12-12 DIAGNOSIS — Z96.649 AFTERCARE FOLLOWING HIP JOINT REPLACEMENT SURGERY, UNSPECIFIED LATERALITY: ICD-10-CM

## 2022-12-12 DIAGNOSIS — Z47.1 AFTERCARE FOLLOWING HIP JOINT REPLACEMENT SURGERY, UNSPECIFIED LATERALITY: ICD-10-CM

## 2022-12-12 DIAGNOSIS — M16.11 PRIMARY OSTEOARTHRITIS OF RIGHT HIP: Primary | ICD-10-CM

## 2022-12-12 PROCEDURE — 97110 THERAPEUTIC EXERCISES: CPT | Mod: GP | Performed by: PHYSICAL THERAPIST

## 2022-12-12 PROCEDURE — 97530 THERAPEUTIC ACTIVITIES: CPT | Mod: GP | Performed by: PHYSICAL THERAPIST

## 2022-12-15 ENCOUNTER — OFFICE VISIT (OUTPATIENT)
Dept: ORTHOPEDICS | Facility: CLINIC | Age: 57
End: 2022-12-15
Payer: COMMERCIAL

## 2022-12-15 DIAGNOSIS — Z48.89 ENCOUNTER FOR POSTOPERATIVE CARE: Primary | ICD-10-CM

## 2022-12-15 PROCEDURE — 99024 POSTOP FOLLOW-UP VISIT: CPT

## 2022-12-15 NOTE — LETTER
12/15/2022         RE: Josette Gregg  [address]  [facility] [address]        Dear Colleague,    Thank you for referring your patient, Josette Gregg, to the [facility] [place]. Please see a copy of my visit note below.    Chief Complaint:   1. Follow up, DOS 11/28/22 with Dr. Marcano    Procedures:  1. R total hip arthroplasty    History:  Josette Gregg is a 57 year old patient 2 weeks s/p above procedure with Dr. Marcano. She has done very well since surgery. Only required a few tabs of oxycodone, pain is controlled with tylenol. Taking ASA for DVT prophylaxis as prescribed. Seeing PT weekly. Ambulating without assistive device in the house, using cane outside. No concerns today, plans to go back to work January 9th.     Exam:     General: Awake, Alert, and oriented. Articulates and communicates with a normal affect     Right Lower Extremity:    Incisions well healed without evidence of infection, no erythema, drainage, or wound dehiscence     Range of motion and stability exam not performed    TA/Gsc/EHL/FHL with 5/5 strength    Distal pulses palpable, toes are warm and well perfused     Gait: Mildly antalgic without use of assistive device     Imaging:  No new imaging.     Medications:     Current Outpatient Medications:      acetaminophen (TYLENOL) 325 MG tablet, Take 2 tablets (650 mg) by mouth every 4 hours as needed for other (mild pain), Disp: 100 tablet, Rfl: 0     aspirin 81 MG EC tablet, Take 1 tablet (81 mg) by mouth 2 times daily, Disp: 60 tablet, Rfl: 0     Multiple Vitamin (MULTIVITAMINS PO), Take 1 tablet by mouth every morning, Disp: , Rfl:      ondansetron (ZOFRAN ODT) 4 MG ODT tab, Take 1 tablet (4 mg) by mouth every 8 hours as needed for nausea, Disp: 15 tablet, Rfl: 0     oxyCODONE (ROXICODONE) 5 MG tablet, Take 1-2 tablets (5-10 mg) by mouth every 4 hours as needed for moderate to severe pain, Disp: 26 tablet, Rfl: 0     polyethylene glycol  (MIRALAX) 17 g packet, Take 17 g by mouth daily, Disp: 7 packet, Rfl: 0     Probiotic Product (PROBIOTIC-10 PO), Take by mouth every morning, Disp: , Rfl:      senna-docusate (SENOKOT-S/PERICOLACE) 8.6-50 MG tablet, Take 1-2 tablets by mouth 2 times daily Take while on oral narcotics to prevent or treat constipation., Disp: 30 tablet, Rfl: 0     simethicone (MYLICON) 125 MG chewable tablet, Take 125 mg by mouth as needed for intestinal gas, Disp: , Rfl:     Assessment:  Doing well 2 weeks s/p right total hip arthroplasty with Dr. Marcano. Basic wound cares were performed today, I did not appreciate signs of infection. Return to work forms completed. We discussed the plan below, all questions were answered to the best of my ability.     Plan:   -Weight bearing: WBAT   -Posterior hip precautions x 3 months  -Follow with physical therapy focusing on gait and stability  -DVT prophylaxis:  mg x 4 weeks  -Follow up: 6 weeks with Dr. Marcano, will obtain new XR    Jaelyn Blake PA-C 12/15/2022 12:59 PM  Orthopedic Surgery

## 2022-12-15 NOTE — NURSING NOTE
Reason For Visit:   Chief Complaint   Patient presents with     Surgical Followup     2 week s/p Rt ANDREW DOS 11/28/202       There were no vitals taken for this visit.         Sharri Ledezma ATC

## 2022-12-15 NOTE — PROGRESS NOTES
Chief Complaint:   1. Follow up, DOS 11/28/22 with Dr. Marcano    Procedures:  1. R total hip arthroplasty    History:  Josette Gregg is a 57 year old patient 2 weeks s/p above procedure with Dr. Marcano. She has done very well since surgery. Only required a few tabs of oxycodone, pain is controlled with tylenol. Taking ASA for DVT prophylaxis as prescribed. Seeing PT weekly. Ambulating without assistive device in the house, using cane outside. No concerns today, plans to go back to work January 9th.     Exam:     General: Awake, Alert, and oriented. Articulates and communicates with a normal affect     Right Lower Extremity:    Incisions well healed without evidence of infection, no erythema, drainage, or wound dehiscence     Range of motion and stability exam not performed    TA/Gsc/EHL/FHL with 5/5 strength    Distal pulses palpable, toes are warm and well perfused     Gait: Mildly antalgic without use of assistive device     Imaging:  No new imaging.     Medications:     Current Outpatient Medications:      acetaminophen (TYLENOL) 325 MG tablet, Take 2 tablets (650 mg) by mouth every 4 hours as needed for other (mild pain), Disp: 100 tablet, Rfl: 0     aspirin 81 MG EC tablet, Take 1 tablet (81 mg) by mouth 2 times daily, Disp: 60 tablet, Rfl: 0     Multiple Vitamin (MULTIVITAMINS PO), Take 1 tablet by mouth every morning, Disp: , Rfl:      ondansetron (ZOFRAN ODT) 4 MG ODT tab, Take 1 tablet (4 mg) by mouth every 8 hours as needed for nausea, Disp: 15 tablet, Rfl: 0     oxyCODONE (ROXICODONE) 5 MG tablet, Take 1-2 tablets (5-10 mg) by mouth every 4 hours as needed for moderate to severe pain, Disp: 26 tablet, Rfl: 0     polyethylene glycol (MIRALAX) 17 g packet, Take 17 g by mouth daily, Disp: 7 packet, Rfl: 0     Probiotic Product (PROBIOTIC-10 PO), Take by mouth every morning, Disp: , Rfl:      senna-docusate (SENOKOT-S/PERICOLACE) 8.6-50 MG tablet, Take 1-2 tablets by mouth 2 times daily Take while on oral  narcotics to prevent or treat constipation., Disp: 30 tablet, Rfl: 0     simethicone (MYLICON) 125 MG chewable tablet, Take 125 mg by mouth as needed for intestinal gas, Disp: , Rfl:     Assessment:  Doing well 2 weeks s/p right total hip arthroplasty with Dr. Marcano. Basic wound cares were performed today, I did not appreciate signs of infection. Return to work forms completed. We discussed the plan below, all questions were answered to the best of my ability.     Plan:   -Weight bearing: WBAT   -Posterior hip precautions x 3 months  -Follow with physical therapy focusing on gait and stability  -DVT prophylaxis:  mg x 4 weeks  -Follow up: 6 weeks with Dr. Marcano, will obtain new XR    Jaelyn Blake PA-C 12/15/2022 12:59 PM  Orthopedic Surgery

## 2022-12-19 ENCOUNTER — DOCUMENTATION ONLY (OUTPATIENT)
Dept: ORTHOPEDICS | Facility: CLINIC | Age: 57
End: 2022-12-19

## 2022-12-19 NOTE — PROGRESS NOTES
Received Completed forms Yes   Faxed Forms Faxed To: [facility]  Fax Number: [phone]   Sent to [facility] (Date) 12/15/22        none

## 2022-12-26 ENCOUNTER — THERAPY VISIT (OUTPATIENT)
Dept: PHYSICAL THERAPY | Facility: CLINIC | Age: 57
End: 2022-12-26
Payer: COMMERCIAL

## 2022-12-26 DIAGNOSIS — Z96.649 AFTERCARE FOLLOWING HIP JOINT REPLACEMENT SURGERY, UNSPECIFIED LATERALITY: ICD-10-CM

## 2022-12-26 DIAGNOSIS — Z47.1 AFTERCARE FOLLOWING HIP JOINT REPLACEMENT SURGERY, UNSPECIFIED LATERALITY: ICD-10-CM

## 2022-12-26 DIAGNOSIS — M16.11 PRIMARY OSTEOARTHRITIS OF RIGHT HIP: Primary | ICD-10-CM

## 2022-12-26 PROCEDURE — 99207 PR STAT THERAPEUTIC EXERCISES - IAM: CPT | Mod: GP | Performed by: PHYSICAL THERAPIST

## 2022-12-26 PROCEDURE — 99207 PR STAT THERATEUTIC ACTIVITIES - IAM: CPT | Mod: GP | Performed by: PHYSICAL THERAPIST

## 2022-12-26 NOTE — PROGRESS NOTES
Diagnosis: R ANDREW   Precautions/Restrictions/MD instructions/Other pertinent hx posterior hip precautions    Therapist Impression:   Doing well.  Initiated step exercises.  Follow up as needed.    GOALS: Dressing, walking    NEXT: review exercises    PTRX: handouts    Subjective:  Doing some stairs.  Knee is doing better.  Started riding on stationary bike.  Balance is going better.    Objective:  Hip Flexion 90 deg  Hip ER 20 deg  Good SL stance  Slight toe in with gait no AD        
independent

## 2022-12-28 ENCOUNTER — TELEPHONE (OUTPATIENT)
Dept: ORTHOPEDICS | Facility: CLINIC | Age: 57
End: 2022-12-28

## 2022-12-28 NOTE — TELEPHONE ENCOUNTER
[facility] Call Center    Phone Message    May a detailed message be left on voicemail: yes     Reason for Call: Other: Pt requesting call back from Dr. Larsen care team.  Pt had surgery with Dr. Marcano for ANDREW and is needing to travel to [place] for .  Pt asking if she will be okay to travel.  Pt can be reached at [phone]     Action Taken: Message routed to:  [facility] ([facility] UMP:  Dr. Jimbo Marcano     Travel Screening: Not Applicable

## 2022-12-28 NOTE — TELEPHONE ENCOUNTER
I spoke with Josette and let her know that Dr. Marcano is fine with her travelling. He just wants her to take one 81mg tablet of aspirin twice daily during her trip. She can stop when she gets back home. She understands and thanked me for the call.  Sharri Ledezma ATC

## 2023-01-11 DIAGNOSIS — Z48.89 ENCOUNTER FOR POSTOPERATIVE CARE: Primary | ICD-10-CM

## 2023-01-12 ENCOUNTER — OFFICE VISIT (OUTPATIENT)
Dept: ORTHOPEDICS | Facility: CLINIC | Age: 58
End: 2023-01-12
Payer: COMMERCIAL

## 2023-01-12 ENCOUNTER — ANCILLARY PROCEDURE (OUTPATIENT)
Dept: GENERAL RADIOLOGY | Facility: CLINIC | Age: 58
End: 2023-01-12
Attending: ORTHOPAEDIC SURGERY
Payer: COMMERCIAL

## 2023-01-12 DIAGNOSIS — Z48.89 ENCOUNTER FOR POSTOPERATIVE CARE: ICD-10-CM

## 2023-01-12 DIAGNOSIS — Z47.89 ORTHOPEDIC AFTERCARE: Primary | ICD-10-CM

## 2023-01-12 PROCEDURE — 99024 POSTOP FOLLOW-UP VISIT: CPT | Performed by: ORTHOPAEDIC SURGERY

## 2023-01-12 PROCEDURE — 99207 XR PELVIS AND HIP RIGHT 1 VIEW: CPT | Mod: GC | Performed by: RADIOLOGY

## 2023-01-12 ASSESSMENT — HOOS JR
LYING IN BED (TURNING OVER, MAINTAINING HIP POSITION): MILD
HOOS JR TOTAL INTERVAL SCORE: 92.34

## 2023-01-12 NOTE — PROGRESS NOTES
Chief Complaint: Surgical Followup (6 week s/p right ANDREW DOS 11/28/22)       HPI: Josette Gregg returns today in follow-up for her right hip. She reports that she is doing very well. Back at work, no cane, no Rx pain medication, discharge from PT. Happy with how she is doing so far.     Medications and allergies are documented in the EMR and have been reviewed.    Current Outpatient Medications:      acetaminophen (TYLENOL) 325 MG tablet, Take 2 tablets (650 mg) by mouth every 4 hours as needed for other (mild pain), Disp: 100 tablet, Rfl: 0     aspirin 81 MG EC tablet, Take 1 tablet (81 mg) by mouth 2 times daily, Disp: 60 tablet, Rfl: 0     Multiple Vitamin (MULTIVITAMINS PO), Take 1 tablet by mouth every morning, Disp: , Rfl:      ondansetron (ZOFRAN ODT) 4 MG ODT tab, Take 1 tablet (4 mg) by mouth every 8 hours as needed for nausea, Disp: 15 tablet, Rfl: 0     oxyCODONE (ROXICODONE) 5 MG tablet, Take 1-2 tablets (5-10 mg) by mouth every 4 hours as needed for moderate to severe pain, Disp: 26 tablet, Rfl: 0     polyethylene glycol (MIRALAX) 17 g packet, Take 17 g by mouth daily, Disp: 7 packet, Rfl: 0     Probiotic Product (PROBIOTIC-10 PO), Take by mouth every morning, Disp: , Rfl:      senna-docusate (SENOKOT-S/PERICOLACE) 8.6-50 MG tablet, Take 1-2 tablets by mouth 2 times daily Take while on oral narcotics to prevent or treat constipation., Disp: 30 tablet, Rfl: 0     simethicone (MYLICON) 125 MG chewable tablet, Take 125 mg by mouth as needed for intestinal gas, Disp: , Rfl:   Allergies: Patient has no known allergies.    Physical Exam:  On physical examination the patient appears the stated age, is in no acute distress, affect is appropriate, and breathing is non-labored.  There were no vitals taken for this visit.  There is no height or weight on file to calculate BMI.  Gait: normal   Incision: healed and benign  ROM: benign  Sciatic function normal and symmetric    X-rays:    I reviewed the x-rays  dated today.  Previous films reviewed.    Findings:  Normal progression for a total hip arthroplasty without evidence of loosening or subsidence.    Assessment: doing well. Discussed activities on total hip, follow-up  Plan: RTC in 6 weeks, sooner if issues.     Referred Self

## 2023-01-12 NOTE — NURSING NOTE
Reason For Visit:   Chief Complaint   Patient presents with     Surgical Followup     6 week s/p right ANDREW DOS 11/28/22       There were no vitals taken for this visit.         Sharri Ledezma ATC

## 2023-01-12 NOTE — LETTER
1/12/2023         RE: Josette Gregg  [address]  [facility] [address]        Dear Colleague,    Thank you for referring your patient, Josette Gregg, to the [facility] [place]. Please see a copy of my visit note below.    Chief Complaint: Surgical Followup (6 week s/p right ANDREW DOS 11/28/22)       HPI: Josette Gregg returns today in follow-up for her right hip. She reports that she is doing very well. Back at work, no cane, no Rx pain medication, discharge from PT. Happy with how she is doing so far.     Medications and allergies are documented in the EMR and have been reviewed.    Current Outpatient Medications:      acetaminophen (TYLENOL) 325 MG tablet, Take 2 tablets (650 mg) by mouth every 4 hours as needed for other (mild pain), Disp: 100 tablet, Rfl: 0     aspirin 81 MG EC tablet, Take 1 tablet (81 mg) by mouth 2 times daily, Disp: 60 tablet, Rfl: 0     Multiple Vitamin (MULTIVITAMINS PO), Take 1 tablet by mouth every morning, Disp: , Rfl:      ondansetron (ZOFRAN ODT) 4 MG ODT tab, Take 1 tablet (4 mg) by mouth every 8 hours as needed for nausea, Disp: 15 tablet, Rfl: 0     oxyCODONE (ROXICODONE) 5 MG tablet, Take 1-2 tablets (5-10 mg) by mouth every 4 hours as needed for moderate to severe pain, Disp: 26 tablet, Rfl: 0     polyethylene glycol (MIRALAX) 17 g packet, Take 17 g by mouth daily, Disp: 7 packet, Rfl: 0     Probiotic Product (PROBIOTIC-10 PO), Take by mouth every morning, Disp: , Rfl:      senna-docusate (SENOKOT-S/PERICOLACE) 8.6-50 MG tablet, Take 1-2 tablets by mouth 2 times daily Take while on oral narcotics to prevent or treat constipation., Disp: 30 tablet, Rfl: 0     simethicone (MYLICON) 125 MG chewable tablet, Take 125 mg by mouth as needed for intestinal gas, Disp: , Rfl:   Allergies: Patient has no known allergies.    Physical Exam:  On physical examination the patient appears the stated age, is in no acute distress, affect is  appropriate, and breathing is non-labored.  There were no vitals taken for this visit.  There is no height or weight on file to calculate BMI.  Gait: normal   Incision: healed and benign  ROM: benign  Sciatic function normal and symmetric    X-rays:    I reviewed the x-rays dated today.  Previous films reviewed.    Findings:  Normal progression for a total hip arthroplasty without evidence of loosening or subsidence.    Assessment: doing well. Discussed activities on total hip, follow-up  Plan: RTC in 6 weeks, sooner if issues.     Referred Self      Jimbo Marcano MD

## 2023-02-15 ENCOUNTER — ANCILLARY PROCEDURE (OUTPATIENT)
Dept: MAMMOGRAPHY | Facility: CLINIC | Age: 58
End: 2023-02-15
Attending: FAMILY MEDICINE
Payer: COMMERCIAL

## 2023-02-15 DIAGNOSIS — Z12.31 VISIT FOR SCREENING MAMMOGRAM: ICD-10-CM

## 2023-02-15 PROCEDURE — 77067 SCR MAMMO BI INCL CAD: CPT | Performed by: RADIOLOGY

## 2023-02-16 ENCOUNTER — TELEPHONE (OUTPATIENT)
Dept: ORTHOPEDICS | Facility: CLINIC | Age: 58
End: 2023-02-16
Payer: COMMERCIAL

## 2023-02-16 NOTE — TELEPHONE ENCOUNTER
[facility]    Phone Message:  Pt would like her FOLLOW UP appt to be by video or phone. Writer's template will not allow writer to schedule a video or phone appt for this provider.      Pt would like a CALL BACK to discuss/schedule through team staff.    Pt would like a CALL BACK, today (1.16.23), if possible. Thank you.    Reason for Call: Other: VIRTUAL Appt Inquiry     Action Taken: Message routed to:  [facility] ([facility] Team    Travel Screening: Not Applicable

## 2023-03-02 ENCOUNTER — TELEPHONE (OUTPATIENT)
Dept: DERMATOLOGY | Facility: CLINIC | Age: 58
End: 2023-03-02

## 2023-03-02 ENCOUNTER — VIRTUAL VISIT (OUTPATIENT)
Dept: ORTHOPEDICS | Facility: CLINIC | Age: 58
End: 2023-03-02
Payer: COMMERCIAL

## 2023-03-02 DIAGNOSIS — Z47.89 ORTHOPEDIC AFTERCARE: Primary | ICD-10-CM

## 2023-03-02 PROCEDURE — 99213 OFFICE O/P EST LOW 20 MIN: CPT | Mod: TEL | Performed by: ORTHOPAEDIC SURGERY

## 2023-03-02 ASSESSMENT — HOOS JR
GOING UP OR DOWN STAIRS: MILD
HOOS JR TOTAL INTERVAL SCORE: 92.34

## 2023-03-02 NOTE — NURSING NOTE
Reason For Visit:   Chief Complaint   Patient presents with     RECHECK     12 week follow up Rt ANDREW 11/28/22       There were no vitals taken for this visit.         Sharri Ledezma, ATC

## 2023-03-02 NOTE — PROGRESS NOTES
Chief Complaint: RECHECK (12 week follow up Rt ANDREW 11/28/22)       HPI: Josette Gregg returns today in follow-up for her right total hip. She reports that the hip is doing well. No complaints today regard the hip. No hip pain, back at work at the , [employer] without issues. The right knee however, has started to be sore, wonders if its walking differently now. Doesn't limit her activities. Not interested right now in coming in to have it looked at.       Medications and allergies are documented in the EMR and have been reviewed.    Current Outpatient Medications:      acetaminophen (TYLENOL) 325 MG tablet, Take 2 tablets (650 mg) by mouth every 4 hours as needed for other (mild pain), Disp: 100 tablet, Rfl: 0     aspirin 81 MG EC tablet, Take 1 tablet (81 mg) by mouth 2 times daily, Disp: 60 tablet, Rfl: 0     Multiple Vitamin (MULTIVITAMINS PO), Take 1 tablet by mouth every morning, Disp: , Rfl:      ondansetron (ZOFRAN ODT) 4 MG ODT tab, Take 1 tablet (4 mg) by mouth every 8 hours as needed for nausea, Disp: 15 tablet, Rfl: 0     oxyCODONE (ROXICODONE) 5 MG tablet, Take 1-2 tablets (5-10 mg) by mouth every 4 hours as needed for moderate to severe pain, Disp: 26 tablet, Rfl: 0     polyethylene glycol (MIRALAX) 17 g packet, Take 17 g by mouth daily, Disp: 7 packet, Rfl: 0     Probiotic Product (PROBIOTIC-10 PO), Take by mouth every morning, Disp: , Rfl:      senna-docusate (SENOKOT-S/PERICOLACE) 8.6-50 MG tablet, Take 1-2 tablets by mouth 2 times daily Take while on oral narcotics to prevent or treat constipation., Disp: 30 tablet, Rfl: 0     simethicone (MYLICON) 125 MG chewable tablet, Take 125 mg by mouth as needed for intestinal gas, Disp: , Rfl:   Allergies: Patient has no known allergies.    Assessment: doing well. Appropriate to observer right knee for now. Happy to see her whenever she likes for it.   Plan: RTC one year for routine hip follow-up    No ref. provider found      12 minutes  were spent on the phone visit today

## 2023-03-02 NOTE — LETTER
3/2/2023         RE: Josette Gregg  [address]  [facility] [address]        Dear Colleague,    Thank you for referring your patient, Josette Gregg, to the [facility] [place]. Please see a copy of my visit note below.        Chief Complaint: RECHECK (12 week follow up Rt ANDREW 11/28/22)       HPI: Josette Gregg returns today in follow-up for her right total hip. She reports that the hip is doing well. No complaints today regard the hip. No hip pain, back at work at the , [employer] [employer] without issues. The right knee however, has started to be sore, wonders if its walking differently now. Doesn't limit her activities. Not interested right now in coming in to have it looked at.       Medications and allergies are documented in the EMR and have been reviewed.    Current Outpatient Medications:      acetaminophen (TYLENOL) 325 MG tablet, Take 2 tablets (650 mg) by mouth every 4 hours as needed for other (mild pain), Disp: 100 tablet, Rfl: 0     aspirin 81 MG EC tablet, Take 1 tablet (81 mg) by mouth 2 times daily, Disp: 60 tablet, Rfl: 0     Multiple Vitamin (MULTIVITAMINS PO), Take 1 tablet by mouth every morning, Disp: , Rfl:      ondansetron (ZOFRAN ODT) 4 MG ODT tab, Take 1 tablet (4 mg) by mouth every 8 hours as needed for nausea, Disp: 15 tablet, Rfl: 0     oxyCODONE (ROXICODONE) 5 MG tablet, Take 1-2 tablets (5-10 mg) by mouth every 4 hours as needed for moderate to severe pain, Disp: 26 tablet, Rfl: 0     polyethylene glycol (MIRALAX) 17 g packet, Take 17 g by mouth daily, Disp: 7 packet, Rfl: 0     Probiotic Product (PROBIOTIC-10 PO), Take by mouth every morning, Disp: , Rfl:      senna-docusate (SENOKOT-S/PERICOLACE) 8.6-50 MG tablet, Take 1-2 tablets by mouth 2 times daily Take while on oral narcotics to prevent or treat constipation., Disp: 30 tablet, Rfl: 0     simethicone (MYLICON) 125 MG chewable tablet, Take 125 mg by mouth as needed for intestinal  gas, Disp: , Rfl:   Allergies: Patient has no known allergies.    Assessment: doing well. Appropriate to observer right knee for now. Happy to see her whenever she likes for it.   Plan: RTC one year for routine hip follow-up    No ref. provider found      12 minutes were spent on the phone visit today     Sincerely,        Jimbo Marcano MD

## 2023-03-03 RX ORDER — AMOXICILLIN 500 MG/1
2000 CAPSULE ORAL ONCE
Qty: 4 CAPSULE | Refills: 1 | Status: SHIPPED | OUTPATIENT
Start: 2023-05-01 | End: 2023-05-01

## 2023-03-22 ENCOUNTER — TELEPHONE (OUTPATIENT)
Dept: DERMATOLOGY | Facility: CLINIC | Age: 58
End: 2023-03-22
Payer: COMMERCIAL

## 2023-03-22 NOTE — TELEPHONE ENCOUNTER
1st/2nd attempts my chart msg sent letter for patient to call to schedule with Mery Frank in [place].

## 2023-03-25 ENCOUNTER — HEALTH MAINTENANCE LETTER (OUTPATIENT)
Age: 58
End: 2023-03-25

## 2023-04-21 PROBLEM — Z96.649 AFTERCARE FOLLOWING HIP JOINT REPLACEMENT SURGERY, UNSPECIFIED LATERALITY: Status: RESOLVED | Noted: 2022-12-05 | Resolved: 2023-04-21

## 2023-04-21 PROBLEM — Z47.1 AFTERCARE FOLLOWING HIP JOINT REPLACEMENT SURGERY, UNSPECIFIED LATERALITY: Status: RESOLVED | Noted: 2022-12-05 | Resolved: 2023-04-21

## 2023-05-24 ASSESSMENT — ENCOUNTER SYMPTOMS
EYE PAIN: 0
COUGH: 0
NAUSEA: 0
HEMATOCHEZIA: 0
CHILLS: 0
FEVER: 0
MYALGIAS: 0
DYSURIA: 0
WEAKNESS: 0
HEARTBURN: 0
JOINT SWELLING: 0
DIARRHEA: 0
CONSTIPATION: 0
ABDOMINAL PAIN: 0
ARTHRALGIAS: 0
SHORTNESS OF BREATH: 0
PALPITATIONS: 0
BREAST MASS: 0
FREQUENCY: 0
DIZZINESS: 0
HEADACHES: 0
PARESTHESIAS: 0
NERVOUS/ANXIOUS: 0
SORE THROAT: 0
HEMATURIA: 0

## 2023-05-25 ENCOUNTER — OFFICE VISIT (OUTPATIENT)
Dept: FAMILY MEDICINE | Facility: CLINIC | Age: 58
End: 2023-05-25
Payer: COMMERCIAL

## 2023-05-25 VITALS
HEIGHT: 62 IN | BODY MASS INDEX: 24.84 KG/M2 | WEIGHT: 135 LBS | DIASTOLIC BLOOD PRESSURE: 72 MMHG | TEMPERATURE: 97.3 F | HEART RATE: 96 BPM | SYSTOLIC BLOOD PRESSURE: 110 MMHG | OXYGEN SATURATION: 99 % | RESPIRATION RATE: 16 BRPM

## 2023-05-25 DIAGNOSIS — Z00.00 ROUTINE GENERAL MEDICAL EXAMINATION AT A HEALTH CARE FACILITY: Primary | ICD-10-CM

## 2023-05-25 PROCEDURE — 99396 PREV VISIT EST AGE 40-64: CPT | Performed by: FAMILY MEDICINE

## 2023-05-25 ASSESSMENT — ENCOUNTER SYMPTOMS
JOINT SWELLING: 0
COUGH: 0
PARESTHESIAS: 0
DIARRHEA: 0
FEVER: 0
MYALGIAS: 0
SORE THROAT: 0
CHILLS: 0
CONSTIPATION: 0
EYE PAIN: 0
BREAST MASS: 0
ARTHRALGIAS: 0
DYSURIA: 0
DIZZINESS: 0
WEAKNESS: 0
NAUSEA: 0
HEARTBURN: 0
NERVOUS/ANXIOUS: 0
SHORTNESS OF BREATH: 0
HEMATOCHEZIA: 0
HEMATURIA: 0
FREQUENCY: 0
ABDOMINAL PAIN: 0
PALPITATIONS: 0
HEADACHES: 0

## 2023-05-25 NOTE — PATIENT INSTRUCTIONS
Recent Labs   Lab Test 05/11/18  0809   CHOL 228*   HDL 66   *   TRIG 124       Preventive Health Recommendations  Female Ages 50 - 64    Yearly exam: See your health care provider every year in order to  Review health changes.   Discuss preventive care.    Review your medicines if your doctor has prescribed any.    Get a Pap test every three years (unless you have an abnormal result and your provider advises testing more often).  If you get Pap tests with HPV test, you only need to test every 5 years, unless you have an abnormal result.   You do not need a Pap test if your uterus was removed (hysterectomy) and you have not had cancer.  You should be tested each year for STDs (sexually transmitted diseases) if you're at risk.   Have a mammogram every 1 to 2 years.  Have a colonoscopy at age 50, or have a yearly FIT test (stool test). These exams screen for colon cancer.    Have a cholesterol test every 5 years, or more often if advised.  Have a diabetes test (fasting glucose) every three years. If you are at risk for diabetes, you should have this test more often.   If you are at risk for osteoporosis (brittle bone disease), think about having a bone density scan (DEXA).    Shots: Get a flu shot each year. Get a tetanus shot every 10 years.    Nutrition:   Eat at least 5 servings of fruits and vegetables each day.  Eat whole-grain bread, whole-wheat pasta and brown rice instead of white grains and rice.  Get adequate Calcium and Vitamin D.     Lifestyle  Exercise at least 150 minutes a week (30 minutes a day, 5 days a week). This will help you control your weight and prevent disease.  Limit alcohol to one drink per day.  No smoking.   Wear sunscreen to prevent skin cancer.   See your dentist every six months for an exam and cleaning.  See your eye doctor every 1 to 2 years.

## 2023-05-25 NOTE — PROGRESS NOTES
SUBJECTIVE:   CC: Josette is an 58 year old who presents for preventive health visit.       5/25/2023     3:37 PM   Additional Questions   Roomed by Fletcher   Accompanied by self   Patient has been advised of split billing requirements and indicates understanding: Yes  Healthy Habits:    Getting at least 3 servings of Calcium per day:  Yes    Bi-annual eye exam:  Yes    Dental care twice a year:  Yes    Sleep apnea or symptoms of sleep apnea:  None    Diet:  Regular (no restrictions)    Frequency of exercise:  6-7 days/week    Duration of exercise:  30-45 minutes    Taking medications regularly:  Yes    Medication side effects:  Not applicable    PHQ-2 Total Score:    Additional concerns today:  No      Have you ever done Advance Care Planning? (For example, a Health Directive, POLST, or a discussion with a medical provider or your loved ones about your wishes): Yes, advance care planning is on file.    Social History     Tobacco Use     Smoking status: Never     Smokeless tobacco: Never   Vaping Use     Vaping status: Not on file   Substance Use Topics     Alcohol use: Yes     Comment: occasional           5/24/2023     1:18 PM   Alcohol Use   Prescreen: >3 drinks/day or >7 drinks/week? Not Applicable     Reviewed orders with patient.  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, right     Aftercare following surgery of the musculoskeletal system      Osteoarthritis of right hip joint due to dysplasia     Past Surgical History:   Procedure Laterality Date     ARTHROPLASTY HIP Right 11/28/2022    Procedure: ARTHROPLASTY, HIP, TOTAL RIGHT;  Surgeon: Jimbo Marcano MD;  Location: [facility] [place] [place]     ARTHROPLASTY MINIMALLY INVASIVE HIP Left 2/16/2015    Procedure: ARTHROPLASTY MINIMALLY INVASIVE HIP;  Surgeon: Edson Oviedo MD;  Location:  OR     ARTHROSCOPY SHOULDER, OPEN BICEP TENODESIS REPAIR, COMBINED  4/9/2014    Procedure: COMBINED ARTHROSCOPY SHOULDER, OPEN BICEP TENODESIS REPAIR;;  Surgeon: Josh Love MD;  Location:  [place]     ARTHROSCOPY SHOULDER, OPEN ROTATOR CUFF REPAIR, COMBINED  4/9/2014    Procedure: COMBINED ARTHROSCOPY SHOULDER, OPEN ROTATOR CUFF REPAIR;  Diagnostic Left SHOULDER ARTHROSCOPY,  OPEN ROTATOR CUFF REPAIR, Biceps Tenodesis;  Surgeon: Josh Love MD;  Location: [facility]     GYN SURGERY       HYSTERECTOMY  1/2008     ORTHOPEDIC SURGERY       US JOINT INJECTION ASPIRATION MAJOR RIGHT  11/3/2010    left shoulder injection Jagdish see scan       Social History     Tobacco Use     Smoking status: Never     Smokeless tobacco: Never   Vaping Use     Vaping status: Not on file   Substance Use Topics     Alcohol use: Yes     Comment: occasional     Family History   Problem Relation Age of Onset     Rheumatoid Arthritis Mother      Melanoma No family hx of      Skin Cancer No family hx of      Anesthesia Reaction No family hx of      Deep Vein Thrombosis (DVT) No family hx of      Cardiovascular No family hx of          Current Outpatient Medications   Medication Sig Dispense Refill     Multiple Vitamin (MULTIVITAMINS PO) Take 1 tablet by mouth every morning       Probiotic Product (PROBIOTIC-10 PO) Take by mouth every morning       simethicone (MYLICON) 125 MG chewable tablet Take 125 mg by mouth as needed for intestinal gas       No Known Allergies  Recent Labs   Lab Test 11/03/22  1500 05/11/18  0809   LDL  --  137*   HDL  --   66   TRIG  --  124   CR 0.74  --    GFRESTIMATED >90  --    POTASSIUM 3.9  --         Breast Cancer Screenin/24/2023     1:19 PM   Breast CA Risk Assessment (FHS-7)   Do you have a family history of breast, colon, or ovarian cancer? No / Unknown         Mammogram Screening: Recommended mammography every 1-2 years with patient discussion and risk factor consideration  Pertinent mammograms are reviewed under the imaging tab.    History of abnormal Pap smear: NO - age 30-65 PAP every 5 years with negative HPV co-testing recommended     Reviewed and updated as needed this visit by clinical staff   Tobacco  Allergies  Meds              Reviewed and updated as needed this visit by Provider                 Past Medical History:   Diagnosis Date     Congenital hip dysplasia     breech, treated     GERD (gastroesophageal reflux disease)      Left shoulder pain 2012     Osteoarthritis of hip       Past Surgical History:   Procedure Laterality Date     ARTHROPLASTY HIP Right 2022    Procedure: ARTHROPLASTY, HIP, TOTAL RIGHT;  Surgeon: Jimbo Marcano MD;  [place] [facility] [place]     ARTHROPLASTY MINIMALLY INVASIVE HIP Left 2015    Procedure: ARTHROPLASTY MINIMALLY INVASIVE HIP;  Surgeon: Edson Oviedo MD;  Location:  OR     ARTHROSCOPY SHOULDER, OPEN BICEP TENODESIS REPAIR, COMBINED  2014    Procedure: COMBINED ARTHROSCOPY SHOULDER, OPEN BICEP TENODESIS REPAIR;;  Surgeon: Josh Love MD;  Location: [facility]     ARTHROSCOPY SHOULDER, OPEN ROTATOR CUFF REPAIR, COMBINED  2014    Procedure: COMBINED ARTHROSCOPY SHOULDER, OPEN ROTATOR CUFF REPAIR;  Diagnostic Left SHOULDER ARTHROSCOPY,  OPEN ROTATOR CUFF REPAIR, Biceps Tenodesis;  Surgeon: Josh Love MD;  Location: [facility]     GYN SURGERY       HYSTERECTOMY  2008     ORTHOPEDIC SURGERY       US JOINT INJECTION ASPIRATION MAJOR RIGHT  11/3/2010    left shoulder injection Jagdish see scan     OB History    Para Term   AB Living   1 1 1 0 0 1   SAB IAB Ectopic Multiple Live Births   0 0 0 0 1      # Outcome Date GA Lbr Ephraim/2nd Weight Sex Delivery Anes PTL Lv   1 Term 91 40w0d  3.26 kg (7 lb 3 oz) M  Spinal  SEMAJ      Name: Vladimir       Review of Systems   Constitutional: Negative for chills and fever.   HENT: Negative for congestion, ear pain, hearing loss and sore throat.    Eyes: Negative for pain and visual disturbance.   Respiratory: Negative for cough and shortness of breath.    Cardiovascular: Negative for chest pain, palpitations and peripheral edema.   Gastrointestinal: Negative for abdominal pain, constipation, diarrhea, heartburn, hematochezia and nausea.   Breasts:  Negative for tenderness, breast mass and discharge.   Genitourinary: Negative for dysuria, frequency, genital sores, hematuria, pelvic pain, urgency, vaginal bleeding and vaginal discharge.   Musculoskeletal: Negative for arthralgias, joint swelling and myalgias.   Skin: Negative for rash.   Neurological: Negative for dizziness, weakness, headaches and paresthesias.   Psychiatric/Behavioral: Negative for mood changes. The patient is not nervous/anxious.      CONSTITUTIONAL: NEGATIVE for fever, chills, change in weight  INTEGUMENTARY/SKIN: NEGATIVE for worrisome rashes, moles or lesions  EYES: NEGATIVE for vision changes or irritation  ENT: NEGATIVE for ear, mouth and throat problems  RESP: NEGATIVE for significant cough or SOB  BREAST: NEGATIVE for masses, tenderness or discharge  CV: NEGATIVE for chest pain, palpitations or peripheral edema  GI: NEGATIVE for nausea, abdominal pain, heartburn, or change in bowel habits  : NEGATIVE for unusual urinary or vaginal symptoms. No vaginal bleeding.  MUSCULOSKELETAL: NEGATIVE for significant arthralgias or myalgia  NEURO: NEGATIVE for weakness, dizziness or paresthesias  PSYCHIATRIC: NEGATIVE for changes in mood or affect      OBJECTIVE:   /72 (BP Location: Right arm, Patient Position:  "Sitting, Cuff Size: Adult Regular)   Pulse 96   Temp 97.3  F (36.3  C) (Temporal)   Resp 16   Ht 1.58 m (5' 2.21\")   Wt 61.2 kg (135 lb)   SpO2 99%   BMI 24.53 kg/m    Physical Exam  GENERAL: healthy, alert and no distress  EYES: Eyes grossly normal to inspection, PERRL and conjunctivae and sclerae normal  HENT: ear canals and TM's normal, nose and mouth without ulcers or lesions  NECK: no adenopathy, no asymmetry, masses, or scars and thyroid normal to palpation  RESP: lungs clear to auscultation - no rales, rhonchi or wheezes  CV: regular rate and rhythm, normal S1 S2, no S3 or S4, no murmur, click or rub, no peripheral edema and peripheral pulses strong  ABDOMEN: soft, nontender, no hepatosplenomegaly, no masses and bowel sounds normal  MS: no gross musculoskeletal defects noted, no edema  SKIN: no suspicious lesions or rashes  NEURO: Normal strength and tone, mentation intact and speech normal  PSYCH: mentation appears normal, affect normal/bright    ASSESSMENT/PLAN:   (Z00.00) Routine general medical examination at a health care facility  (primary encounter diagnosis)  routine      COUNSELING:  Reviewed preventive health counseling, as reflected in patient instructions        She reports that she has never smoked. She has never used smokeless tobacco.          Jessica Hogue MD  [facility]  "

## 2023-06-05 DIAGNOSIS — M25.569 KNEE PAIN: Primary | ICD-10-CM

## 2023-06-08 ENCOUNTER — ANCILLARY PROCEDURE (OUTPATIENT)
Dept: GENERAL RADIOLOGY | Facility: CLINIC | Age: 58
End: 2023-06-08
Attending: ORTHOPAEDIC SURGERY
Payer: COMMERCIAL

## 2023-06-08 DIAGNOSIS — M25.569 KNEE PAIN: ICD-10-CM

## 2023-06-08 PROCEDURE — 73562 X-RAY EXAM OF KNEE 3: CPT | Mod: RT | Performed by: RADIOLOGY

## 2023-06-21 ASSESSMENT — KOOS JR
GOING UP OR DOWN STAIRS: MILD
KOOS JR SCORING: 79.91
TWISING OR PIVOTING ON KNEE: MILD
STRAIGHTENING KNEE FULLY: MILD

## 2023-06-21 ASSESSMENT — ENCOUNTER SYMPTOMS
MUSCLE CRAMPS: 0
STIFFNESS: 0
BACK PAIN: 0
MYALGIAS: 0
NECK PAIN: 0
JOINT SWELLING: 0
MUSCLE WEAKNESS: 0

## 2023-06-21 NOTE — TELEPHONE ENCOUNTER
DIAGNOSIS: RT Knee - RETURN PATIENT/NEW ISSUE   APPOINTMENT DATE: 06/22/2023   NOTES STATUS DETAILS   OFFICE NOTE from referring provider SELF 03/02/2023 - Jimbo Marcano MD - [facility] Ortho   OFFICE NOTE from other specialist Internal    OPERATIVE REPORT Internal 11/28/2022 - RT ANDREW  02/16/2015 - LT Hip 2 Incision ANDREW   MEDICATION LIST Internal    IMPLANT RECORD/STICKER Internal    LABS     XRAYS (IMAGES & REPORTS) PACS Internal

## 2023-06-22 ENCOUNTER — PRE VISIT (OUTPATIENT)
Dept: ORTHOPEDICS | Facility: CLINIC | Age: 58
End: 2023-06-22

## 2023-06-22 ENCOUNTER — OFFICE VISIT (OUTPATIENT)
Dept: ORTHOPEDICS | Facility: CLINIC | Age: 58
End: 2023-06-22
Payer: COMMERCIAL

## 2023-06-22 DIAGNOSIS — M25.561 CHRONIC PAIN OF RIGHT KNEE: Primary | ICD-10-CM

## 2023-06-22 DIAGNOSIS — G89.29 CHRONIC PAIN OF RIGHT KNEE: Primary | ICD-10-CM

## 2023-06-22 PROCEDURE — 99213 OFFICE O/P EST LOW 20 MIN: CPT

## 2023-06-22 NOTE — LETTER
6/22/2023         RE: Josette Gregg  [address]  [place] [address]        Dear Colleague,    Thank you for referring your patient, Josette Gregg, to the [facility]. Please see a copy of my visit note below.    Assessment: This is a 58 year old s/p L ANDREW (2/16/15) and R ANDREW (11/28/23) with 7 months atraumatic right knee pain. No significant degenerative change on XR.     Plan:    We discussed different treatment options including OTC pain medication, activity modification, and steroid injection. In the absence of any acute event or injury, I did not think advanced imaging was necessary at this time. Josette would like to continue with conservative management and will call to schedule a R knee steroid injection at a more convenient time. If she fails to see improvement or symptoms become more severe, would consider MRI R knee.       Chief Complaint: Consult (Right knee pain limiting activities. Spoke about it briefly at last visit. XR 6/8/2023 No injections.)      Physician:  Referred Self    HPI: Josette Gregg is a 58 year old female who presents today for evaluation of right knee pain. No acute event or injury. Josette thought it could have been from the changes in her gait after her right ANDREW, though her gait has normalized now and R knee discomfort is unchanged. Right hip has continued to do well. Occasional soreness over posterior and lateral thigh. She is able to perform most desired activities including bike riding and walking. Back to work at [employer], able to perform all work related activities.     Symptom Profile  Location of symptoms:  Anterior, lateral aspect of the patella. No painful mechanical symptoms.   Onset: Started after R ANDREW, no trauma or injury   Trend: staying the same   Duration of symptoms: 7 months   Quality of symptoms: aching, sharp/stabbing  Severity: Moderate   Alleviate: activity modification, ibuprofen,  tylenol   Exacerbating: ascending stairs, bike riding   Previous Treatments: Previous treatments include activity modification, oral pain medication    CARLA Jr: 79.91  PROMIS Mental: (P) 17  PROMIS Physical (P) 18  PROMIS TOTAL (P) 35  [facility]: 4    MEDICAL HISTORY:   Past Medical History:   Diagnosis Date    Congenital hip dysplasia     breech, treated    GERD (gastroesophageal reflux disease)     Left shoulder pain 07/16/2012    Osteoarthritis of hip        Medications:     Current Outpatient Medications:     Multiple Vitamin (MULTIVITAMINS PO), Take 1 tablet by mouth every morning, Disp: , Rfl:     Probiotic Product (PROBIOTIC-10 PO), Take by mouth every morning, Disp: , Rfl:     simethicone (MYLICON) 125 MG chewable tablet, Take 125 mg by mouth as needed for intestinal gas, Disp: , Rfl:     Allergies: Patient has no known allergies.    SURGICAL HISTORY:   Past Surgical History:   Procedure Laterality Date    ARTHROPLASTY HIP Right 11/28/2022    Procedure: ARTHROPLASTY, HIP, TOTAL RIGHT;  Surgeon: Jimbo Marcano MD;  [place] [facility] [place]    ARTHROPLASTY MINIMALLY INVASIVE HIP Left 2/16/2015    Procedure: ARTHROPLASTY MINIMALLY INVASIVE HIP;  Surgeon: Edson Oviedo MD;  Location:  OR    ARTHROSCOPY SHOULDER, OPEN BICEP TENODESIS REPAIR, COMBINED  4/9/2014    Procedure: COMBINED ARTHROSCOPY SHOULDER, OPEN BICEP TENODESIS REPAIR;;  Surgeon: Josh Love MD;  Location: [facility]    ARTHROSCOPY SHOULDER, OPEN ROTATOR CUFF REPAIR, COMBINED  4/9/2014    Procedure: COMBINED ARTHROSCOPY SHOULDER, OPEN ROTATOR CUFF REPAIR;  Diagnostic Left SHOULDER ARTHROSCOPY,  OPEN ROTATOR CUFF REPAIR, Biceps Tenodesis;  Surgeon: Josh Love MD;  Location: [facility]    GYN SURGERY      HYSTERECTOMY  1/2008    ORTHOPEDIC SURGERY      US JOINT INJECTION ASPIRATION MAJOR RIGHT  11/3/2010    left shoulder injection [place] see scan       FAMILY HISTORY:   Family History   Problem Relation Age of Onset    Rheumatoid Arthritis  Mother     Melanoma No family hx of     Skin Cancer No family hx of     Anesthesia Reaction No family hx of     Deep Vein Thrombosis (DVT) No family hx of     Cardiovascular No family hx of        SOCIAL HISTORY:   Social History     Tobacco Use    Smoking status: Never    Smokeless tobacco: Never   Substance Use Topics    Alcohol use: Yes     Comment: occasional       REVIEW OF SYSTEMS:  The comprehensive review of systems from the intake form was reviewed with the patient.  No fever, weight change or fatigue. No dry eyes. No oral ulcers, sore throat or voice change. No palpitations, syncope, angina or edema.  No chest pain, excessive sleepiness, shortness of breath or hemoptysis.   No abdominal pain, nausea, vomiting, diarrhea or heartburn.  No skin rash. No focal weakness or numbness. No bleeding or lymphadenopathy. No rhinitis or hives.     Exam:  On physical examination the patient appears the stated age, is in no acute distress, affect is appropriate, and breathing is non-labored.  Vitals are documented in the EMR and have been reviewed:    There were no vitals taken for this visit.  Data Unavailable  There is no height or weight on file to calculate BMI.    Rises from chair: Easily  Gait: Smooth and symmetric     Right  Knee  Appearance: benign  Clinical alignment: Neutral   Effusion: -  Tenderness to palpation: None, no joint line tenderness    Patellar translation: 2 quadrants laterally with firm endpoint, no apprehension   Extension: 0  Flexion: 145  Collateral ligaments: intact  Cruciate ligaments: grossly intact     Left Knee  Appearance: benign  Clinical alignment: Neutral   Effusion: -    Hip examination: benign hip ROM with groin or anterior thigh symptoms.     Distally, the circulatory, motor, and sensation exam is intact with 5/5 EHL, gastroc-soleus, and tibialis anterior.    Sensation to light touch is intact.    Dorsalis pedis and posterior tibialis pulses are palpable.    There are no sores on  the feet, no bruising, and no lymphedema.    X-rays:   Impression:  1. No acute osseous abnormality.  2. No substantial degenerative change           GARY STEPHENC

## 2023-06-22 NOTE — NURSING NOTE
Reason For Visit:   Chief Complaint   Patient presents with     Consult     Right knee pain limiting activities. Spoke about it briefly at last visit. XR 6/8/2023 No injections.       There were no vitals taken for this visit.         Sharri Ledezma, ATC

## 2023-06-22 NOTE — PROGRESS NOTES
Assessment: This is a 58 year old s/p L ANDREW (2/16/15) and R ANDREW (11/28/23) with 7 months atraumatic right knee pain. No significant degenerative change on XR.     Plan:    We discussed different treatment options including OTC pain medication, activity modification, and steroid injection. In the absence of any acute event or injury, I did not think advanced imaging was necessary at this time. Josette would like to continue with conservative management and will call to schedule a R knee steroid injection at a more convenient time. If she fails to see improvement or symptoms become more severe, would consider MRI R knee.       Chief Complaint: Consult (Right knee pain limiting activities. Spoke about it briefly at last visit. XR 6/8/2023 No injections.)      Physician:  Referred Self    HPI: Josette Gregg is a 58 year old female who presents today for evaluation of right knee pain. No acute event or injury. Josette thought it could have been from the changes in her gait after her right ANDREW, though her gait has normalized now and R knee discomfort is unchanged. Right hip has continued to do well. Occasional soreness over posterior and lateral thigh. She is able to perform most desired activities including bike riding and walking. Back to work at [employer], able to perform all work related activities.     Symptom Profile  Location of symptoms:  Anterior, lateral aspect of the patella. No painful mechanical symptoms.   Onset: Started after R ANDREW, no trauma or injury   Trend: staying the same   Duration of symptoms: 7 months   Quality of symptoms: aching, sharp/stabbing  Severity: Moderate   Alleviate: activity modification, ibuprofen, tylenol   Exacerbating: ascending stairs, bike riding   Previous Treatments: Previous treatments include activity modification, oral pain medication    CARLA Jr: 79.91  PROMIS Mental: (P) 17  PROMIS Physical (P) 18  PROMIS TOTAL (P) 35  [facility]: 4    MEDICAL HISTORY:   Past  Medical History:   Diagnosis Date     Congenital hip dysplasia     breech, treated     GERD (gastroesophageal reflux disease)      Left shoulder pain 07/16/2012     Osteoarthritis of hip        Medications:     Current Outpatient Medications:      Multiple Vitamin (MULTIVITAMINS PO), Take 1 tablet by mouth every morning, Disp: , Rfl:      Probiotic Product (PROBIOTIC-10 PO), Take by mouth every morning, Disp: , Rfl:      simethicone (MYLICON) 125 MG chewable tablet, Take 125 mg by mouth as needed for intestinal gas, Disp: , Rfl:     Allergies: Patient has no known allergies.    SURGICAL HISTORY:   Past Surgical History:   Procedure Laterality Date     ARTHROPLASTY HIP Right 11/28/2022    Procedure: ARTHROPLASTY, HIP, TOTAL RIGHT;  Surgeon: Jimbo Marcano MD;  [place] [facility] [place]     ARTHROPLASTY MINIMALLY INVASIVE HIP Left 2/16/2015    Procedure: ARTHROPLASTY MINIMALLY INVASIVE HIP;  Surgeon: Edson Oviedo MD;  Location:  OR     ARTHROSCOPY SHOULDER, OPEN BICEP TENODESIS REPAIR, COMBINED  4/9/2014    Procedure: COMBINED ARTHROSCOPY SHOULDER, OPEN BICEP TENODESIS REPAIR;;  Surgeon: Josh Love MD;  Location: [facility]     ARTHROSCOPY SHOULDER, OPEN ROTATOR CUFF REPAIR, COMBINED  4/9/2014    Procedure: COMBINED ARTHROSCOPY SHOULDER, OPEN ROTATOR CUFF REPAIR;  Diagnostic Left SHOULDER ARTHROSCOPY,  OPEN ROTATOR CUFF REPAIR, Biceps Tenodesis;  Surgeon: Josh Love MD;  Location: [facility]     GYN SURGERY       HYSTERECTOMY  1/2008     ORTHOPEDIC SURGERY       US JOINT INJECTION ASPIRATION MAJOR RIGHT  11/3/2010    left shoulder injection Jagdish see scan       FAMILY HISTORY:   Family History   Problem Relation Age of Onset     Rheumatoid Arthritis Mother      Melanoma No family hx of      Skin Cancer No family hx of      Anesthesia Reaction No family hx of      Deep Vein Thrombosis (DVT) No family hx of      Cardiovascular No family hx of        SOCIAL HISTORY:   Social History     Tobacco Use      Smoking status: Never     Smokeless tobacco: Never   Substance Use Topics     Alcohol use: Yes     Comment: occasional       REVIEW OF SYSTEMS:  The comprehensive review of systems from the intake form was reviewed with the patient.  No fever, weight change or fatigue. No dry eyes. No oral ulcers, sore throat or voice change. No palpitations, syncope, angina or edema.  No chest pain, excessive sleepiness, shortness of breath or hemoptysis.   No abdominal pain, nausea, vomiting, diarrhea or heartburn.  No skin rash. No focal weakness or numbness. No bleeding or lymphadenopathy. No rhinitis or hives.     Exam:  On physical examination the patient appears the stated age, is in no acute distress, affect is appropriate, and breathing is non-labored.  Vitals are documented in the EMR and have been reviewed:    There were no vitals taken for this visit.  Data Unavailable  There is no height or weight on file to calculate BMI.    Rises from chair: Easily  Gait: Smooth and symmetric     Right  Knee  Appearance: benign  Clinical alignment: Neutral   Effusion: -  Tenderness to palpation: None, no joint line tenderness    Patellar translation: 2 quadrants laterally with firm endpoint, no apprehension   Extension: 0  Flexion: 145  Collateral ligaments: intact  Cruciate ligaments: grossly intact     Left Knee  Appearance: benign  Clinical alignment: Neutral   Effusion: -    Hip examination: benign hip ROM with groin or anterior thigh symptoms.     Distally, the circulatory, motor, and sensation exam is intact with 5/5 EHL, gastroc-soleus, and tibialis anterior.    Sensation to light touch is intact.    Dorsalis pedis and posterior tibialis pulses are palpable.    There are no sores on the feet, no bruising, and no lymphedema.    X-rays:   Impression:  1. No acute osseous abnormality.  2. No substantial degenerative change

## 2023-10-25 ENCOUNTER — OFFICE VISIT (OUTPATIENT)
Dept: DERMATOLOGY | Facility: CLINIC | Age: 58
End: 2023-10-25
Payer: COMMERCIAL

## 2023-10-25 DIAGNOSIS — L82.1 SEBORRHEIC KERATOSIS: ICD-10-CM

## 2023-10-25 DIAGNOSIS — L81.4 SOLAR LENTIGO: ICD-10-CM

## 2023-10-25 DIAGNOSIS — D22.9 MULTIPLE BENIGN NEVI: ICD-10-CM

## 2023-10-25 DIAGNOSIS — D23.71 DERMATOFIBROMA OF RIGHT LOWER LEG: ICD-10-CM

## 2023-10-25 DIAGNOSIS — D18.01 CHERRY ANGIOMA: ICD-10-CM

## 2023-10-25 DIAGNOSIS — Z12.83 SKIN CANCER SCREENING: Primary | ICD-10-CM

## 2023-10-25 PROCEDURE — 99213 OFFICE O/P EST LOW 20 MIN: CPT | Performed by: PHYSICIAN ASSISTANT

## 2023-10-25 ASSESSMENT — PAIN SCALES - GENERAL: PAINLEVEL: NO PAIN (0)

## 2023-10-25 NOTE — PROGRESS NOTES
[facility] Dermatology Note  Encounter Date: Oct 25, 2023  Office Visit     Dermatology Problem List:  1. Inflamed seborrheic keratosis, left upper chest s/p biopsy 11/30/17  2. Skin cancer screening 10/25/23  ____________________________________________    Assessment & Plan:    # Dermatofibroma, benign reassurance given.   - Benign, patient reassured.    # Skin cancer screening with multiple benign nevi, solar lentigines    - ABCDEs: Counseled ABCDEs of melanoma: Asymmetry, Border (irregularity), Color (not uniform, changes in color), Diameter (greater than 6 mm which is about the size of a pencil eraser), and Evolving (any changes in preexisting moles).  - Sun protection: Counseled SPF30+ sunscreen, UPF clothing, sun avoidance, tanning bed avoidance.    # Cherry angiomas and seborrheic keratoses,  Benign, reassurance given.     Procedures Performed:   None    Follow-up: 2 year(s) in-person, or earlier for new or changing lesions    Staff and Scribe:     I, Jeanie Lugo, am serving as a scribe to document services personally performed by Mery Frank PA-C based on data collection and the provider's statements to me.    Provider Disclosure:   The documentation recorded by the scribe accurately reflects the services I personally performed and the decisions made by me.    All risks, benefits and alternatives were discussed with patient.  Patient is in agreement and understands the assessment and plan.  All questions were answered.  Sun Screen Education was given.   Return to Clinic in 2 years  or sooner as needed.   Mery Frank PA-C   [facility]    ____________________________________________    CC: Skin Check (Annual FBSE.  No concern.  )    HPI:  Ms. Josette Gregg is a(n) 58 year old female who presents today as a return patient for FBSC.  Patient's last FBSC was on 3/2/2022 and is due for a repeat.  She denies any new skin concerns today and  denies any changes in her family history.      Patient is otherwise feeling well, without additional skin concerns.    Labs Reviewed:  N/A    Physical Exam:  Vitals: There were no vitals taken for this visit.  SKIN: Total skin excluding the undergarment areas was performed. The exam included the head/face, neck, both arms, chest, back, abdomen, both legs, digits and/or nails.    - There is a firm tan/flesh colored papule that dimples with lateral pressure on the right lower extremity x2.   - There is a waxy stuck on tan to brown papule on the face.   - There are dome shaped bright red papules on the trunk and extremities.   Multiple regular brown pigmented macules and papules are identified on the  trunk and extremities.   Scattered brown macules on sun exposed areas.  - No other lesions of concern on areas examined.     Medications:  Current Outpatient Medications   Medication    Multiple Vitamin (MULTIVITAMINS PO)    Probiotic Product (PROBIOTIC-10 PO)    simethicone (MYLICON) 125 MG chewable tablet     No current facility-administered medications for this visit.      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 on close of this encounter.

## 2023-10-25 NOTE — LETTER
10/25/2023       RE: Josette Gregg  [address]  [place] [facility] [address]     Dear Colleague,    Thank you for referring your patient, Josette Gregg, to the [facility] DERMATOLOGY [facility] [place] [facility]. Please see a copy of my visit note below.    [facility] Dermatology Note  Encounter Date: Oct 25, 2023  Office Visit     Dermatology Problem List:  1. Inflamed seborrheic keratosis, left upper chest s/p biopsy 11/30/17  2. Skin cancer screening 10/25/23  ____________________________________________    Assessment & Plan:    # Dermatofibroma, benign reassurance given.   - Benign, patient reassured.    # Skin cancer screening with multiple benign nevi, solar lentigines    - ABCDEs: Counseled ABCDEs of melanoma: Asymmetry, Border (irregularity), Color (not uniform, changes in color), Diameter (greater than 6 mm which is about the size of a pencil eraser), and Evolving (any changes in preexisting moles).  - Sun protection: Counseled SPF30+ sunscreen, UPF clothing, sun avoidance, tanning bed avoidance.    # Cherry angiomas and seborrheic keratoses,  Benign, reassurance given.     Procedures Performed:   None    Follow-up: 2 year(s) in-person, or earlier for new or changing lesions    Staff and Scribe:     I, Jeanie Lugo, am serving as a scribe to document services personally performed by Mery Frank PA-C based on data collection and the provider's statements to me.    Provider Disclosure:   The documentation recorded by the scribe accurately reflects the services I personally performed and the decisions made by me.    All risks, benefits and alternatives were discussed with patient.  Patient is in agreement and understands the assessment and plan.  All questions were answered.  Sun Screen Education was given.   Return to Clinic in 2 years  or sooner as needed.   Mery Frank PA-C   [facility]  Dermatology Clinic    ____________________________________________    CC: Skin Check (Annual FBSE.  No concern.  )    HPI:  Ms. Josette Gregg is a(n) 58 year old female who presents today as a return patient for FBSC.  Patient's last FBSC was on 3/2/2022 and is due for a repeat.  She denies any new skin concerns today and denies any changes in her family history.      Patient is otherwise feeling well, without additional skin concerns.    Labs Reviewed:  N/A    Physical Exam:  Vitals: There were no vitals taken for this visit.  SKIN: Total skin excluding the undergarment areas was performed. The exam included the head/face, neck, both arms, chest, back, abdomen, both legs, digits and/or nails.    - There is a firm tan/flesh colored papule that dimples with lateral pressure on the right lower extremity x2.   - There is a waxy stuck on tan to brown papule on the face.   - There are dome shaped bright red papules on the trunk and extremities.   Multiple regular brown pigmented macules and papules are identified on the  trunk and extremities.   Scattered brown macules on sun exposed areas.  - No other lesions of concern on areas examined.     Medications:  Current Outpatient Medications   Medication    Multiple Vitamin (MULTIVITAMINS PO)    Probiotic Product (PROBIOTIC-10 PO)    simethicone (MYLICON) 125 MG chewable tablet     No current facility-administered medications for this visit.      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 on close of this encounter.

## 2023-10-25 NOTE — NURSING NOTE
Dermatology Rooming Note    Josette Gregg's goals for this visit include:   Chief Complaint   Patient presents with    Skin Check     Annual FBSE.  No concern.       Lovely Boles CMA

## 2023-10-26 NOTE — PATIENT INSTRUCTIONS

## 2024-03-06 ENCOUNTER — ANCILLARY PROCEDURE (OUTPATIENT)
Dept: MAMMOGRAPHY | Facility: CLINIC | Age: 59
End: 2024-03-06
Attending: FAMILY MEDICINE
Payer: COMMERCIAL

## 2024-03-06 DIAGNOSIS — Z12.31 VISIT FOR SCREENING MAMMOGRAM: ICD-10-CM

## 2024-03-06 PROCEDURE — 77063 BREAST TOMOSYNTHESIS BI: CPT | Mod: GC | Performed by: STUDENT IN AN ORGANIZED HEALTH CARE EDUCATION/TRAINING PROGRAM

## 2024-03-06 PROCEDURE — 77067 SCR MAMMO BI INCL CAD: CPT | Mod: GC | Performed by: STUDENT IN AN ORGANIZED HEALTH CARE EDUCATION/TRAINING PROGRAM

## 2024-04-25 ENCOUNTER — PATIENT OUTREACH (OUTPATIENT)
Dept: CARE COORDINATION | Facility: CLINIC | Age: 59
End: 2024-04-25
Payer: COMMERCIAL

## 2024-05-09 ENCOUNTER — PATIENT OUTREACH (OUTPATIENT)
Dept: CARE COORDINATION | Facility: CLINIC | Age: 59
End: 2024-05-09
Payer: COMMERCIAL

## 2024-07-29 ENCOUNTER — MYC MEDICAL ADVICE (OUTPATIENT)
Dept: FAMILY MEDICINE | Facility: CLINIC | Age: 59
End: 2024-07-29
Payer: COMMERCIAL

## 2024-07-31 NOTE — TELEPHONE ENCOUNTER
Writer responded via [employer].  ABRRY McgarryN, RN-BC  [facility]

## 2024-08-01 ENCOUNTER — TELEPHONE (OUTPATIENT)
Dept: FAMILY MEDICINE | Facility: CLINIC | Age: 59
End: 2024-08-01

## 2024-08-01 NOTE — TELEPHONE ENCOUNTER
Connected to patient and advised below  Patient states she will be most likely utilizing [facility]  She was disappointed in the lack of clarification regarding process for UTI VV. Empathized with patient and sent general reminder that if we are discussing this we should give direct instructions on what is needed to complete-

## 2024-08-01 NOTE — TELEPHONE ENCOUNTER
Earlier was visit was cancelled and I do not have any availabilities for the rest of the day. Can you please schedule pt with any provider who has availabilities and they will be able to order UA/UC.  DM

## 2024-08-01 NOTE — TELEPHONE ENCOUNTER
General Call    Contacts       Contact Date/Time Type Contact Phone/Fax    08/01/2024 11:04 AM CDT Phone (Incoming) Josette Gregg AIDEN (Self) [phone] ()          Reason for Call:  Lab order     What are your questions or concerns:  Requesting lab order for UTI be placed so she can complete and then she will schedule a visit to discuss after. Was advised visit is needed for UTI order, but she states she had to cancel visit as she is at work and not able to come for UA. Please advise next steps-    Date of last appointment with provider: N/A    Could we send this information to you in [employer] or would you prefer to receive a phone call?:   Patient would prefer a phone call   Okay to leave a detailed message?: Yes at Home number on file [phone] (home)

## 2024-08-04 ENCOUNTER — HEALTH MAINTENANCE LETTER (OUTPATIENT)
Age: 59
End: 2024-08-04

## 2024-09-26 ENCOUNTER — OFFICE VISIT (OUTPATIENT)
Dept: FAMILY MEDICINE | Facility: CLINIC | Age: 59
End: 2024-09-26
Payer: COMMERCIAL

## 2024-09-26 VITALS
HEART RATE: 68 BPM | SYSTOLIC BLOOD PRESSURE: 105 MMHG | HEIGHT: 62 IN | BODY MASS INDEX: 25.01 KG/M2 | DIASTOLIC BLOOD PRESSURE: 70 MMHG | RESPIRATION RATE: 15 BRPM | TEMPERATURE: 97.3 F | WEIGHT: 135.9 LBS | OXYGEN SATURATION: 98 %

## 2024-09-26 DIAGNOSIS — Z13.1 SCREENING FOR DIABETES MELLITUS: ICD-10-CM

## 2024-09-26 DIAGNOSIS — Z13.6 CARDIOVASCULAR SCREENING; LDL GOAL LESS THAN 160: ICD-10-CM

## 2024-09-26 DIAGNOSIS — Z00.00 ROUTINE GENERAL MEDICAL EXAMINATION AT A HEALTH CARE FACILITY: Primary | ICD-10-CM

## 2024-09-26 PROCEDURE — 99396 PREV VISIT EST AGE 40-64: CPT | Mod: 25 | Performed by: FAMILY MEDICINE

## 2024-09-26 PROCEDURE — 91320 SARSCV2 VAC 30MCG TRS-SUC IM: CPT | Performed by: FAMILY MEDICINE

## 2024-09-26 PROCEDURE — 90636 HEP A/HEP B VACC ADULT IM: CPT | Performed by: FAMILY MEDICINE

## 2024-09-26 PROCEDURE — 90471 IMMUNIZATION ADMIN: CPT | Performed by: FAMILY MEDICINE

## 2024-09-26 PROCEDURE — 90472 IMMUNIZATION ADMIN EACH ADD: CPT | Performed by: FAMILY MEDICINE

## 2024-09-26 PROCEDURE — 90715 TDAP VACCINE 7 YRS/> IM: CPT | Performed by: FAMILY MEDICINE

## 2024-09-26 PROCEDURE — 90480 ADMN SARSCOV2 VAC 1/ONLY CMP: CPT | Performed by: FAMILY MEDICINE

## 2024-09-26 ASSESSMENT — PAIN SCALES - GENERAL: PAINLEVEL: NO PAIN (0)

## 2024-09-26 NOTE — PROGRESS NOTES
"Preventive Care Visit  [facility]  Jessica Hogue MD, Family Medicine  Sep 26, 2024      Assessment & Plan     (Z00.00) Routine general medical examination at a health care facility  (primary encounter diagnosis)    (Z13.1) Screening for diabetes mellitus  Plan: Glucose          (Z13.6) CARDIOVASCULAR SCREENING; LDL GOAL LESS THAN 160  Plan: Lipid panel reflex to direct LDL Fasting         BMI  Estimated body mass index is 25.26 kg/m  as calculated from the following:    Height as of this encounter: 1.562 m (5' 1.5\").    Weight as of this encounter: 61.6 kg (135 lb 14.4 oz).       Counseling  Appropriate preventive services were addressed with this patient via screening, questionnaire, or discussion as appropriate for fall prevention, nutrition, physical activity, Tobacco-use cessation, social engagement, weight loss and cognition.  Checklist reviewing preventive services available has been given to the patient.  Reviewed patient's diet, addressing concerns and/or questions.     Veronica Finch is a 59 year old, presenting for the following:  Physical and Imm/Inj (Covid and maintenance shots)        9/26/2024     3:42 PM   Additional Questions   Roomed by Rafaela BUSH        Health Care Directive  Patient does not have a Health Care Directive or Living Will: Patient states has Advance Directive and will bring in a copy to clinic.    Imm/Inj          9/25/2024   General Health   How would you rate your overall physical health? Good   Feel stress (tense, anxious, or unable to sleep) Not at all            9/25/2024   Nutrition   Three or more servings of calcium each day? Yes   Diet: Regular (no restrictions)   How many servings of fruit and vegetables per day? (!) 2-3   How many sweetened beverages each day? 0-1            9/25/2024   Exercise   Days per week of moderate/strenous exercise 5 days   Average minutes spent exercising at this level 30 min            9/25/2024   Social " Factors   Frequency of gathering with friends or relatives More than three times a week   Worry food won't last until get money to buy more No   Food not last or not have enough money for food? No   Do you have housing? (Housing is defined as stable permanent housing and does not include staying ouside in a car, in a tent, in an abandoned building, in an overnight shelter, or couch-surfing.) Yes   Are you worried about losing your housing? No   Lack of transportation? No   Unable to get utilities (heat,electricity)? No            9/25/2024   Fall Risk   Fallen 2 or more times in the past year? No   Trouble with walking or balance? No             9/25/2024   Dental   Dentist two times every year? Yes            9/25/2024   TB Screening   Were you born outside of the [place]? No            Today's PHQ-2 Score:       9/25/2024    10:13 AM   PHQ-2 ( 1999 [employer])   Q1: Little interest or pleasure in doing things 0   Q2: Feeling down, depressed or hopeless 0   PHQ-2 Score 0   Q1: Little interest or pleasure in doing things Not at all   Q2: Feeling down, depressed or hopeless Not at all   PHQ-2 Score 0           9/25/2024   Substance Use   Alcohol more than 3/day or more than 7/wk No   Do you use any other substances recreationally? No        Social History     Tobacco Use    Smoking status: Never    Smokeless tobacco: Never   Substance Use Topics    Alcohol use: Yes     Comment: occasional    Drug use: No           3/6/2024   LAST FHS-7 RESULTS   1st degree relative breast or ovarian cancer No   Any relative bilateral breast cancer No   Any male have breast cancer No   Any ONE woman have BOTH breast AND ovarian cancer No   Any woman with breast cancer before 50yrs No   2 or more relatives with breast AND/OR ovarian cancer No   2 or more relatives with breast AND/OR bowel cancer No           Mammogram Screening - Mammogram every 1-2 years updated in Health Maintenance based on mutual decision making        9/25/2024   STI  Screening   New sexual partner(s) since last STI/HIV test? No      History of abnormal Pap smear: No - age 30- 64 PAP with HPV every 5 years recommended  S/p DANNY     ASCVD Risk   The ASCVD Risk score (Candace WOOD, et al., 2019) failed to calculate for the following reasons:    Cannot find a previous HDL lab    Cannot find a previous total cholesterol lab       Reviewed and updated as needed this visit by Provider   Tobacco     Med Hx  Surg Hx   Soc Hx Sexual Activity          Past Medical History:   Diagnosis Date    Congenital hip dysplasia     breech, treated    GERD (gastroesophageal reflux disease)     Left shoulder pain 2012    Osteoarthritis of hip      Past Surgical History:   Procedure Laterality Date    ARTHROPLASTY HIP Right 2022    Procedure: ARTHROPLASTY, HIP, TOTAL RIGHT;  Surgeon: Jimbo Marcano MD;  [facility]: [facility] [place]    ARTHROPLASTY MINIMALLY INVASIVE HIP Left 2015    Procedure: ARTHROPLASTY MINIMALLY INVASIVE HIP;  Surgeon: Edson Oviedo MD;  Location:  OR    ARTHROSCOPY SHOULDER, OPEN BICEP TENODESIS REPAIR, COMBINED  2014    Procedure: COMBINED ARTHROSCOPY SHOULDER, OPEN BICEP TENODESIS REPAIR;;  Surgeon: Josh Love MD;  Location: [facility]    ARTHROSCOPY SHOULDER, OPEN ROTATOR CUFF REPAIR, COMBINED  2014    Procedure: COMBINED ARTHROSCOPY SHOULDER, OPEN ROTATOR CUFF REPAIR;  Diagnostic Left SHOULDER ARTHROSCOPY,  OPEN ROTATOR CUFF REPAIR, Biceps Tenodesis;  Surgeon: Josh Love MD;  Location: [facility]    GYN SURGERY      HYSTERECTOMY  2008    ORTHOPEDIC SURGERY      US JOINT INJECTION ASPIRATION MAJOR RIGHT  11/3/2010    left shoulder injection [place] see scan     OB History    Para Term  AB Living   1 1 1 0 0 1   SAB IAB Ectopic Multiple Live Births   0 0 0 0 1      # Outcome Date GA Lbr Ephraim/2nd Weight Sex Type Anes PTL Lv   1 Term 91 40w0d  3.26 kg (7 lb 3 oz) M  Spinal  SEMAJ      Name: Vladimir Brooks  "of Systems  Constitutional, HEENT, cardiovascular, pulmonary, gi and gu systems are negative, except as otherwise noted.     Objective    Exam  /70 (BP Location: Right arm, Patient Position: Sitting, Cuff Size: Adult Regular)   Pulse 68   Temp 97.3  F (36.3  C) (Temporal)   Resp 15   Ht 1.562 m (5' 1.5\")   Wt 61.6 kg (135 lb 14.4 oz)   SpO2 98%   BMI 25.26 kg/m     Estimated body mass index is 25.26 kg/m  as calculated from the following:    Height as of this encounter: 1.562 m (5' 1.5\").    Weight as of this encounter: 61.6 kg (135 lb 14.4 oz).    Physical Exam  GENERAL: alert and no distress  EYES: Eyes grossly normal to inspection, PERRL and conjunctivae and sclerae normal  HENT: ear canals and TM's normal, nose and mouth without ulcers or lesions  NECK: no adenopathy, no asymmetry, masses, or scars  RESP: lungs clear to auscultation - no rales, rhonchi or wheezes  CV: regular rate and rhythm, normal S1 S2, no S3 or S4, no murmur, click or rub, no peripheral edema  ABDOMEN: soft, nontender, no hepatosplenomegaly, no masses and bowel sounds normal  MS: no gross musculoskeletal defects noted, no edema  SKIN: no suspicious lesions or rashes  NEURO: Normal strength and tone, mentation intact and speech normal  PSYCH: mentation appears normal, affect normal/bright        Signed Electronically by: Jessica Hogue MD    "

## 2024-09-26 NOTE — NURSING NOTE
Prior to immunization administration, verified patients identity using patient s name and date of birth. Please see Immunization Activity for additional information.     Screening Questionnaire for Adult Immunization    Are you sick today?   No   Do you have allergies to medications, food, a vaccine component or latex?   No   Have you ever had a serious reaction after receiving a vaccination?   No   Do you have a long-term health problem with heart, lung, kidney, or metabolic disease (e.g., diabetes), asthma, a blood disorder, no spleen, complement component deficiency, a cochlear implant, or a spinal fluid leak?  Are you on long-term aspirin therapy?   No   Do you have cancer, leukemia, HIV/AIDS, or any other immune system problem?   No   Do you have a parent, brother, or sister with an immune system problem?   No   In the past 3 months, have you taken medications that affect  your immune system, such as prednisone, other steroids, or anticancer drugs; drugs for the treatment of rheumatoid arthritis, Crohn s disease, or psoriasis; or have you had radiation treatments?   No   Have you had a seizure, or a brain or other nervous system problem?   No   During the past year, have you received a transfusion of blood or blood    products, or been given immune (gamma) globulin or antiviral drug?   No   For women: Are you pregnant or is there a chance you could become       pregnant during the next month?   No   Have you received any vaccinations in the past 4 weeks?   Yes     Immunization questionnaire was positive for at least one answer.  Notified Spraks.      Patient instructed to remain in clinic for 15 minutes afterwards, and to report any adverse reactions.     Screening performed by Garfield Oscar CMA on 9/26/2024 at 4:37 PM.

## 2024-09-26 NOTE — PATIENT INSTRUCTIONS
Patient Education   Preventive Care Advice   This is general advice given by our system to help you stay healthy. However, your care team may have specific advice just for you. Please talk to your care team about your preventive care needs.  Nutrition  Eat 5 or more servings of fruits and vegetables each day.  Try wheat bread, brown rice and whole grain pasta (instead of white bread, rice, and pasta).  Get enough calcium and vitamin D. Check the label on foods and aim for 100% of the RDA (recommended daily allowance).  Lifestyle  Exercise at least 150 minutes each week  (30 minutes a day, 5 days a week).  Do muscle strengthening activities 2 days a week. These help control your weight and prevent disease.  No smoking.  Wear sunscreen to prevent skin cancer.  Have a dental exam and cleaning every 6 months.  Yearly exams  See your health care team every year to talk about:  Any changes in your health.  Any medicines your care team has prescribed.  Preventive care, family planning, and ways to prevent chronic diseases.  Shots (vaccines)   HPV shots (up to age 26), if you've never had them before.  Hepatitis B shots (up to age 59), if you've never had them before.  COVID-19 shot: Get this shot when it's due.  Flu shot: Get a flu shot every year.  Tetanus shot: Get a tetanus shot every 10 years.  Pneumococcal, hepatitis A, and RSV shots: Ask your care team if you need these based on your risk.  Shingles shot (for age 50 and up)  General health tests  Diabetes screening:  Starting at age 35, Get screened for diabetes at least every 3 years.  If you are younger than age 35, ask your care team if you should be screened for diabetes.  Cholesterol test: At age 39, start having a cholesterol test every 5 years, or more often if advised.  Bone density scan (DEXA): At age 50, ask your care team if you should have this scan for osteoporosis (brittle bones).  Hepatitis C: Get tested at least once in your life.  STIs (sexually  transmitted infections)  Before age 24: Ask your care team if you should be screened for STIs.  After age 24: Get screened for STIs if you're at risk. You are at risk for STIs (including HIV) if:  You are sexually active with more than one person.  You don't use condoms every time.  You or a partner was diagnosed with a sexually transmitted infection.  If you are at risk for HIV, ask about PrEP medicine to prevent HIV.  Get tested for HIV at least once in your life, whether you are at risk for HIV or not.  Cancer screening tests  Cervical cancer screening: If you have a cervix, begin getting regular cervical cancer screening tests starting at age 21.  Breast cancer scan (mammogram): If you've ever had breasts, begin having regular mammograms starting at age 40. This is a scan to check for breast cancer.  Colon cancer screening: It is important to start screening for colon cancer at age 45.  Have a colonoscopy test every 10 years (or more often if you're at risk) Or, ask your provider about stool tests like a FIT test every year or Cologuard test every 3 years.  To learn more about your testing options, visit:   .  For help making a decision, visit:   https://bit.ly/xb47405.  Prostate cancer screening test: If you have a prostate, ask your care team if a prostate cancer screening test (PSA) at age 55 is right for you.  Lung cancer screening: If you are a current or former smoker ages 50 to 80, ask your care team if ongoing lung cancer screenings are right for you.  For informational purposes only. Not to replace the advice of your health care provider. Copyright   2023 [employer]. All rights reserved. Clinically reviewed by the [facility] Transitions Program. [employer] 162236 - REV 01/24.

## 2024-10-22 ENCOUNTER — LAB (OUTPATIENT)
Dept: LAB | Facility: CLINIC | Age: 59
End: 2024-10-22
Payer: COMMERCIAL

## 2024-10-22 DIAGNOSIS — Z13.1 SCREENING FOR DIABETES MELLITUS: ICD-10-CM

## 2024-10-22 DIAGNOSIS — Z13.6 CARDIOVASCULAR SCREENING; LDL GOAL LESS THAN 160: ICD-10-CM

## 2024-10-22 LAB
CHOLEST SERPL-MCNC: 241 MG/DL
FASTING STATUS PATIENT QL REPORTED: ABNORMAL
FASTING STATUS PATIENT QL REPORTED: NORMAL
GLUCOSE SERPL-MCNC: 97 MG/DL (ref 70–99)
HDLC SERPL-MCNC: 57 MG/DL
LDLC SERPL CALC-MCNC: 150 MG/DL
NONHDLC SERPL-MCNC: 184 MG/DL
TRIGL SERPL-MCNC: 171 MG/DL

## 2024-10-22 PROCEDURE — 82947 ASSAY GLUCOSE BLOOD QUANT: CPT

## 2024-10-22 PROCEDURE — 36415 COLL VENOUS BLD VENIPUNCTURE: CPT

## 2024-10-22 PROCEDURE — 80061 LIPID PANEL: CPT

## 2024-10-25 NOTE — RESULT ENCOUNTER NOTE
Thank you very much for getting labs done!     Your glucose was normal, which is great, you do not have diabetes.     Thank you for getting your cholesterol checked!  Your total cholesterol and triglycerides are a bit high, your LDL cholesterol is at goal and your HDL is excellent.    Desired or goal levels are:  CHOLESTEROL: Desirable is less than 200.  HDL (Good Cholesterol): Desirable is greater than 40 in men and greater than 50 in women.  LDL (Bad Cholesterol): Desirable is less than 160  TRIGLYCERIDES: Desirable is less than 150.    To keep triglycerides in check,  reduce carbohydrates/sugar in your diet by reducing portion size of carbohydrates including sweets, juice, alcohol, white bread, crackers, pasta, rice, potatoes and cereal.     You can also reduce your triglycerides by increasing your activity level. Exercise for at least 30 min 5 times per week, and lift weights 2-3 times per week to build muscle mass and improve your metabolism.    Intermittent fasting, allowing at least 12 hours between dinner and breakfast can also help lower your total and LDL cholesterol as well as your triglycerides. The goal is to try to push most of your daily calories into a window between say 8 am and 6 pm, and really try to reduce calories consumed in the afternoon and evening, aiming for not eating at all right before bed.     As you may know, abnormal cholesterol is one factor that increases your risk for heart disease and stroke. You can improve your cholesterol by controlling the amount and type of fat you eat and by increasing your daily activity level.    Here are some ways to improve your nutrition (adapted from the [employer] handout):  Eat less fat (especially butter, Crisco and other saturated fats)  Buy lean cuts of meat; reduce your portions of red meat or substitute poultry or fish, or avoid meat altogether.  Use skim milk and low-fat dairy products  Eat no more than 4 egg yolks per  week  Avoid fried or fast foods that are high in fat  Eat more fruits and vegetables, trying to make your plate of food at least half non-starchy vegetables.      If you have any questions, please contact the clinic or schedule an appointment with me, thank you!    Sincerely,  Dr. Jessica Hogue MD  10/25/2024

## 2024-10-29 ENCOUNTER — ALLIED HEALTH/NURSE VISIT (OUTPATIENT)
Dept: FAMILY MEDICINE | Facility: CLINIC | Age: 59
End: 2024-10-29
Payer: COMMERCIAL

## 2024-10-29 DIAGNOSIS — Z23 ENCOUNTER FOR IMMUNIZATION: Primary | ICD-10-CM

## 2024-10-29 PROCEDURE — 90636 HEP A/HEP B VACC ADULT IM: CPT

## 2024-10-29 PROCEDURE — 90471 IMMUNIZATION ADMIN: CPT

## 2024-10-29 PROCEDURE — 99207 PR NO CHARGE NURSE ONLY: CPT

## 2024-10-29 NOTE — PROGRESS NOTES
Prior to immunization administration, verified patients identity using patient s name and date of birth. Please see Immunization Activity for additional information.     Is the patient's temperature normal (100.5 or less)? Yes     Patient MEETS CRITERIA. PROCEED with vaccine administration.          10/29/2024   Hepatitis B   Have you had a serious reaction to a hepatitis B vaccine or to something in a hepatitis B vaccine, including yeast)? No   Are you getting kidney dialysis (either peritoneal or hemodialysis)? No   Do you have an allergy to latex? No            Patient MEETS CRITERIA. PROCEED with vaccine administration.        Patient instructed to remain in clinic for 15 minutes afterwards, and to report any adverse reactions.      Link to Ancillary Visit Immunization Standing Orders SmartSet     Screening performed by Matilde Ayoub MA on 10/29/2024 at 10:13 AM.

## 2025-03-25 ENCOUNTER — TELEPHONE (OUTPATIENT)
Dept: DERMATOLOGY | Facility: CLINIC | Age: 60
End: 2025-03-25
Payer: COMMERCIAL

## 2025-03-25 NOTE — TELEPHONE ENCOUNTER
3/25 Patient confirmed scheduled appointment:  Date: 1/5/2026  Time: 3pm  Visit type: Return Dermatology  Provider: Zac  Location: [facility]  Testing/imaging: na  Additional notes: na

## 2025-03-31 NOTE — PATIENT INSTRUCTIONS
"Hello!    It was a pleasure to see you today. Please complete the exercises below and remember, a few minutes of practice many times throughout the day is more important than one large practice session. If you have any questions about your strategies, feel free to contact me at [email or web address] or via [email or web address]. Please call [phone] for scheduling alterations.     We will plan to see each other next on 4/19 at 10am. Please remember to logon to [employer] a few minutes early to complete the questionnaires before your visit starts.     Home Exercise Program:  BELLY BREATHING (10 breaths AM/mid-day/PM)  SET-UP: Imagine stretching your arms toward the ceiling- but you can just stretch your shoulders in order to avoid raising your left arm. Feel a stretch through the sides of your ribcage and take two slow, deep breaths in and out. Next lean your shoulders toward each side, feeling your ribcage lift and expand along the opposite side and take two slow, deep breaths in and out on each side. Then arch your back to feel a stretch along the front of your ribcage and take two slow, deep breaths in and out. Then lean forward, relaxing over your knees, and allow your back and neck to stretch and take two slow, deep breaths in and out feeling your back stretch and expand.   PRACTICE:   1. Sit hinged forward with your elbows on your knees. OR Lay on your back OR Sit normally and place your hands on your LOW BELLY and small of your back.  2. Slowly breathe in and feel your belly and back expand, like filling a BIG BLUE BALLOON, pushing out against your waistband  3. Slowly breathe out and feel your belly and back crunch inward, like zipping tight pants  4. Repeat slowly and take breaks if you get dizzy  ____________________________________________________    SCM Release  Gently massage the muscle that starts at the bump behind your ear and curves around to the front of your throat, to the little \"twiggies\" at the " Appointment made   "bottom. You can gently knead with your thumb and the flat part of your pointer on each side. Do one side at a time.   Anterior Larynx Release  Place your fingers flat along the base of your throat, about an inch above the \"twiggies\". Gently apply pressure in a downward motion. If it feels helpful, you can use the thumb on your other hand to gently stretch upward under your chin, to create an opposite direction stretch. The intensity should not exceed 4/10.   Heat & Cold  You can apply heat if it feels helpful to get the muscles to relax, but always finish with a cold pack to reduce inflammation.    REFLUX MANAGEMENT (ongoing as needed)  -- When reflux comes up the wrong way from your stomach and spills into your throat, you may not feel it come up if the acidity levels are low. However, liquid spilling onto the vocal cords can cause lots of throat irritation, cough and throat clearing, hoarseness, and even difficulty breathing.   1. Elevate the head of your bed about 4 inches, trying to keep the mattress straight to avoid any back or neck aches.   2. Avoid eating/drinking close to when you lay down. The less there is in your stomach, the less can spill back up during the night.   3. Try to eat foods with a high PH level (less acidic). Possible reflux triggers include spicy foods, acidic foods, and foods that may relax the esophageal sphincters and allow back-splash, such as raw onions, garlic, or peppers, store-bought tomato sauces/salsas, pizza, greasy foods, orange or lemon juice, chocolate, caffeine, carbonation, alcohol, mints and menthol. Try to avoid these foods for about a month so your throat has time to heal and calm down, then reintroduce 1 food every 3-4 days, using a notebook to track any increase in symptoms to figure out which foods are your trigger foods.   4. Avoid putting pressure on your stomach by slouching, wearing tight clothes, or carrying extra weight that pushes on your stomach.   5. Actively " "reduce stress. Stress can cause a fight or flight response in your body, even if you feel like you're able to handle stress. Try deep breathing, yoga, or mindfulness meditation to help calm your nervous system (Calm, Headspace, Yoga with Kandace on youtube, etc). Square Breathing is an easy to use stress management tool: Inhale for 4 seconds, hold your breath for 4 seconds, exhale for 4 seconds, and hold your breath for 4 seconds. Repeat at least 4 times.      VOICE EXERCISES (AM & PM, more if needed)  -- Inhale SILENTLY and feel your belly fill with air  -- Slowly exhale as your belly contracts, ROUND \"OO\" LIPS  -- Hold your streamer about 6 inches from the end of your straw  -- Think of saying \"Whoooooo\" and watch for a smooth, gentle streamer movement, like a flag blowing in a breeze  1. 3-5x silently with just air for 5-7 seconds  2. 3-5x short and easy sighs on \"Whooo\"  3. 3-5x foghorn on single pitch for 5-7 seconds  4. 3-5x sliding lower for 5-7 seconds (\"ding dong\")  5. 3-5x sliding higher for 5-7 seconds (\"here comes the bride\")  6. 3-5x sliding up and down like sirens  7. Bonus: Easy songs/melodies with slurred phrases and no stopping streamer  -- Double-check that your inhale is a SILENT yawn and your LIPS are ROUNDED      Thank you and have a great day!  Herminia"

## 2025-04-01 ENCOUNTER — ALLIED HEALTH/NURSE VISIT (OUTPATIENT)
Dept: FAMILY MEDICINE | Facility: CLINIC | Age: 60
End: 2025-04-01
Payer: COMMERCIAL

## 2025-04-01 DIAGNOSIS — Z23 ENCOUNTER FOR IMMUNIZATION: Primary | ICD-10-CM

## 2025-04-01 DIAGNOSIS — Z23 NEED FOR VACCINATION: ICD-10-CM

## 2025-04-01 PROCEDURE — 99207 PR NO CHARGE NURSE ONLY: CPT

## 2025-04-01 PROCEDURE — 90636 HEP A/HEP B VACC ADULT IM: CPT

## 2025-04-01 PROCEDURE — 90471 IMMUNIZATION ADMIN: CPT

## 2025-04-01 NOTE — PROGRESS NOTES
Prior to immunization administration, verified patients identity using patient s name and date of birth. Please see Immunization Activity for additional information.     Is the patient's temperature normal (100.5 or less)? Yes     Patient MEETS CRITERIA. PROCEED with vaccine administration.          4/1/2025   Hepatitis B   Have you had a serious reaction to a hepatitis B vaccine or to something in a hepatitis B vaccine, including yeast)? No   Are you getting kidney dialysis (either peritoneal or hemodialysis)? No   Do you have an allergy to latex? No         Patient MEETS CRITERIA. PROCEED with vaccine administration.        Patient instructed to remain in clinic for 15 minutes afterwards, and to report any adverse reactions.      Link to Ancillary Visit Immunization Standing Orders SmartSet     Screening performed by SHAZIA LYNCH on 4/1/2025 at 10:08 AM.

## 2025-04-22 ENCOUNTER — ANCILLARY PROCEDURE (OUTPATIENT)
Dept: MAMMOGRAPHY | Facility: CLINIC | Age: 60
End: 2025-04-22
Attending: FAMILY MEDICINE
Payer: COMMERCIAL

## 2025-04-22 DIAGNOSIS — Z12.31 VISIT FOR SCREENING MAMMOGRAM: ICD-10-CM

## 2025-04-22 PROCEDURE — 77063 BREAST TOMOSYNTHESIS BI: CPT | Mod: GC | Performed by: RADIOLOGY

## 2025-04-22 PROCEDURE — 77067 SCR MAMMO BI INCL CAD: CPT | Mod: GC | Performed by: RADIOLOGY

## 2025-08-27 ENCOUNTER — PATIENT OUTREACH (OUTPATIENT)
Dept: CARE COORDINATION | Facility: CLINIC | Age: 60
End: 2025-08-27
Payer: COMMERCIAL

## (undated) DEVICE — DEVICE RETRIEVER HEWSON 71111579

## (undated) DEVICE — HOOD FLYTE W/PEELAWAY 408-800-100

## (undated) DEVICE — SU ETHIBOND 5 V-37 4X30" MB66G

## (undated) DEVICE — LINEN ORTHO PACK 5446

## (undated) DEVICE — GOWN IMPERVIOUS SPECIALTY XLG/XLONG 32474

## (undated) DEVICE — SOL NACL 0.9% IRRIG 500ML BOTTLE 2F7123

## (undated) DEVICE — LINEN DRAPE 54X72" 5467

## (undated) DEVICE — SU MONOCRYL 3-0 PS-1 27" Y936H

## (undated) DEVICE — DRAPE STERI TOWEL LG 1010

## (undated) DEVICE — PREP DURAPREP REMOVER 4OZ 8611

## (undated) DEVICE — DRSG KERLIX 4 1/2"X4YDS ROLL 6730

## (undated) DEVICE — ESU PENCIL SMOKE EVAC W/ROCKER SWITCH 0703-047-000

## (undated) DEVICE — ESU GROUND PAD ADULT W/CORD E7507

## (undated) DEVICE — GOWN XLG DISP 9545

## (undated) DEVICE — BLADE SAW RECIP STRK 70X6X0.025MM 0277-096-250S5

## (undated) DEVICE — GLOVE PROTEXIS POWDER FREE 8.0 ORTHOPEDIC 2D73ET80

## (undated) DEVICE — LINEN TOWEL PACK X5 5464

## (undated) DEVICE — IMM PILLOW ABDUCT HIP MED 31143061

## (undated) DEVICE — DRAPE IOBAN INCISE 36X23" 6651EZ

## (undated) DEVICE — DRSG AQUACEL AG 3.5X9.75" HYDROFIBER 412011

## (undated) DEVICE — GLOVE PROTEXIS MICRO 6.0  2D73PM60

## (undated) DEVICE — BLADE KNIFE SURG 20 371120

## (undated) DEVICE — PACK TOTAL HIP W/POUCH RIVERSIDE LATEX FREE

## (undated) DEVICE — GLOVE PROTEXIS POWDER FREE 7.5 ORTHOPEDIC 2D73ET75

## (undated) DEVICE — SU VICRYL 0 CT 36" J358H

## (undated) DEVICE — SU VICRYL 2-0 CT-1 27" UND J259H

## (undated) DEVICE — STRAP STIRRUP W/SLIP 30187-030

## (undated) DEVICE — GLOVE PROTEXIS POWDER FREE SMT 8.0  2D72PT80X

## (undated) DEVICE — ADH SKIN CLOSURE PREMIERPRO EXOFIN 1.0ML 3470

## (undated) DEVICE — GLOVE PROTEXIS BLUE W/NEU-THERA 6.5  2D73EB65

## (undated) DEVICE — PREP DURAPREP 26ML APL 8630

## (undated) DEVICE — DRILL BIT FLEXIBLE REFLECTION 35MM 71362935

## (undated) RX ORDER — TRANEXAMIC ACID 100 MG/ML
INJECTION, SOLUTION INTRAVENOUS
Status: DISPENSED
Start: 2022-11-28

## (undated) RX ORDER — OXYCODONE HYDROCHLORIDE 5 MG/1
TABLET ORAL
Status: DISPENSED
Start: 2022-11-28

## (undated) RX ORDER — PROPOFOL 10 MG/ML
INJECTION, EMULSION INTRAVENOUS
Status: DISPENSED
Start: 2022-11-28

## (undated) RX ORDER — TRIAMCINOLONE ACETONIDE 40 MG/ML
INJECTION, SUSPENSION INTRA-ARTICULAR; INTRAMUSCULAR
Status: DISPENSED
Start: 2022-07-08

## (undated) RX ORDER — HYDROMORPHONE HYDROCHLORIDE 1 MG/ML
INJECTION, SOLUTION INTRAMUSCULAR; INTRAVENOUS; SUBCUTANEOUS
Status: DISPENSED
Start: 2022-11-28

## (undated) RX ORDER — BUPIVACAINE HYDROCHLORIDE 2.5 MG/ML
INJECTION, SOLUTION EPIDURAL; INFILTRATION; INTRACAUDAL
Status: DISPENSED
Start: 2022-07-08

## (undated) RX ORDER — GLYCOPYRROLATE 0.2 MG/ML
INJECTION INTRAMUSCULAR; INTRAVENOUS
Status: DISPENSED
Start: 2022-11-28

## (undated) RX ORDER — ACETAMINOPHEN 325 MG/1
TABLET ORAL
Status: DISPENSED
Start: 2022-11-28

## (undated) RX ORDER — LIDOCAINE HYDROCHLORIDE 10 MG/ML
INJECTION, SOLUTION EPIDURAL; INFILTRATION; INTRACAUDAL; PERINEURAL
Status: DISPENSED
Start: 2022-07-08

## (undated) RX ORDER — DEXAMETHASONE SODIUM PHOSPHATE 4 MG/ML
INJECTION, SOLUTION INTRA-ARTICULAR; INTRALESIONAL; INTRAMUSCULAR; INTRAVENOUS; SOFT TISSUE
Status: DISPENSED
Start: 2022-11-28

## (undated) RX ORDER — TRIAMCINOLONE ACETONIDE 40 MG/ML
INJECTION, SUSPENSION INTRA-ARTICULAR; INTRAMUSCULAR
Status: DISPENSED
Start: 2022-02-22

## (undated) RX ORDER — ONDANSETRON 2 MG/ML
INJECTION INTRAMUSCULAR; INTRAVENOUS
Status: DISPENSED
Start: 2022-11-28

## (undated) RX ORDER — CEFAZOLIN SODIUM 1 G/3ML
INJECTION, POWDER, FOR SOLUTION INTRAMUSCULAR; INTRAVENOUS
Status: DISPENSED
Start: 2022-11-28

## (undated) RX ORDER — EPHEDRINE SULFATE 50 MG/ML
INJECTION, SOLUTION INTRAMUSCULAR; INTRAVENOUS; SUBCUTANEOUS
Status: DISPENSED
Start: 2022-11-28

## (undated) RX ORDER — BUPIVACAINE HYDROCHLORIDE 2.5 MG/ML
INJECTION, SOLUTION EPIDURAL; INFILTRATION; INTRACAUDAL
Status: DISPENSED
Start: 2022-02-22

## (undated) RX ORDER — FENTANYL CITRATE 50 UG/ML
INJECTION, SOLUTION INTRAMUSCULAR; INTRAVENOUS
Status: DISPENSED
Start: 2022-11-28

## (undated) RX ORDER — LIDOCAINE HYDROCHLORIDE 10 MG/ML
INJECTION, SOLUTION EPIDURAL; INFILTRATION; INTRACAUDAL; PERINEURAL
Status: DISPENSED
Start: 2022-02-22